# Patient Record
Sex: FEMALE | Race: WHITE | NOT HISPANIC OR LATINO | Employment: PART TIME | URBAN - METROPOLITAN AREA
[De-identification: names, ages, dates, MRNs, and addresses within clinical notes are randomized per-mention and may not be internally consistent; named-entity substitution may affect disease eponyms.]

---

## 2017-01-06 ENCOUNTER — ALLSCRIPTS OFFICE VISIT (OUTPATIENT)
Dept: OTHER | Facility: OTHER | Age: 42
End: 2017-01-06

## 2017-01-12 ENCOUNTER — ALLSCRIPTS OFFICE VISIT (OUTPATIENT)
Dept: OTHER | Facility: OTHER | Age: 42
End: 2017-01-12

## 2017-01-13 ENCOUNTER — TRANSCRIBE ORDERS (OUTPATIENT)
Dept: RADIOLOGY | Facility: CLINIC | Age: 42
End: 2017-01-13

## 2017-01-13 ENCOUNTER — HOSPITAL ENCOUNTER (OUTPATIENT)
Dept: RADIOLOGY | Facility: CLINIC | Age: 42
Discharge: HOME | End: 2017-01-13
Payer: COMMERCIAL

## 2017-01-13 ENCOUNTER — ALLSCRIPTS OFFICE VISIT (OUTPATIENT)
Dept: OTHER | Facility: OTHER | Age: 42
End: 2017-01-13

## 2017-01-13 ENCOUNTER — GENERIC CONVERSION - ENCOUNTER (OUTPATIENT)
Dept: OTHER | Facility: OTHER | Age: 42
End: 2017-01-13

## 2017-01-13 DIAGNOSIS — S99.922A INJURY OF LEFT FOOT: ICD-10-CM

## 2017-01-13 PROCEDURE — 73630 X-RAY EXAM OF FOOT: CPT

## 2017-01-26 ENCOUNTER — ALLSCRIPTS OFFICE VISIT (OUTPATIENT)
Dept: OTHER | Facility: OTHER | Age: 42
End: 2017-01-26

## 2017-02-11 ENCOUNTER — TRANSCRIBE ORDERS (OUTPATIENT)
Dept: ADMINISTRATIVE | Facility: HOSPITAL | Age: 42
End: 2017-02-11

## 2017-02-11 DIAGNOSIS — R10.9 ABDOMINAL PAIN, UNSPECIFIED SITE: ICD-10-CM

## 2017-02-11 DIAGNOSIS — Z98.890 PERSONAL HISTORY OF SURGERY TO HEART AND GREAT VESSELS, PRESENTING HAZARDS TO HEALTH: Primary | ICD-10-CM

## 2017-02-14 ENCOUNTER — HOSPITAL ENCOUNTER (OUTPATIENT)
Dept: RADIOLOGY | Facility: HOSPITAL | Age: 42
Discharge: HOME | End: 2017-02-14
Attending: FAMILY MEDICINE
Payer: COMMERCIAL

## 2017-02-14 DIAGNOSIS — R10.9 ABDOMINAL PAIN, UNSPECIFIED SITE: ICD-10-CM

## 2017-02-14 DIAGNOSIS — Z98.890 PERSONAL HISTORY OF SURGERY TO HEART AND GREAT VESSELS, PRESENTING HAZARDS TO HEALTH: ICD-10-CM

## 2017-02-14 PROCEDURE — 74177 CT ABD & PELVIS W/CONTRAST: CPT

## 2017-02-14 RX ADMIN — IOHEXOL 100 ML: 350 INJECTION, SOLUTION INTRAVENOUS at 10:39

## 2017-02-16 ENCOUNTER — GENERIC CONVERSION - ENCOUNTER (OUTPATIENT)
Dept: OTHER | Facility: OTHER | Age: 42
End: 2017-02-16

## 2017-02-20 ENCOUNTER — ALLSCRIPTS OFFICE VISIT (OUTPATIENT)
Dept: OTHER | Facility: OTHER | Age: 42
End: 2017-02-20

## 2017-02-20 DIAGNOSIS — H93.19 TINNITUS: ICD-10-CM

## 2017-02-21 ENCOUNTER — GENERIC CONVERSION - ENCOUNTER (OUTPATIENT)
Dept: OTHER | Facility: OTHER | Age: 42
End: 2017-02-21

## 2017-02-22 ENCOUNTER — GENERIC CONVERSION - ENCOUNTER (OUTPATIENT)
Dept: OTHER | Facility: OTHER | Age: 42
End: 2017-02-22

## 2017-02-22 ENCOUNTER — ALLSCRIPTS OFFICE VISIT (OUTPATIENT)
Dept: OTHER | Facility: OTHER | Age: 42
End: 2017-02-22

## 2017-02-23 LAB
ENDOMYSIAL IGA ANTIBODY (HISTORICAL): NEGATIVE
IGA (HISTORICAL): 175 MG/DL (ref 87–352)
TTG IGA (HISTORICAL): <2 U/ML (ref 0–3)

## 2017-02-24 ENCOUNTER — GENERIC CONVERSION - ENCOUNTER (OUTPATIENT)
Dept: OTHER | Facility: OTHER | Age: 42
End: 2017-02-24

## 2017-02-28 ENCOUNTER — ALLSCRIPTS OFFICE VISIT (OUTPATIENT)
Dept: OTHER | Facility: OTHER | Age: 42
End: 2017-02-28

## 2017-03-01 ENCOUNTER — ALLSCRIPTS OFFICE VISIT (OUTPATIENT)
Dept: OTHER | Facility: OTHER | Age: 42
End: 2017-03-01

## 2017-03-01 ENCOUNTER — GENERIC CONVERSION - ENCOUNTER (OUTPATIENT)
Dept: OTHER | Facility: OTHER | Age: 42
End: 2017-03-01

## 2017-03-01 ENCOUNTER — TRANSCRIBE ORDERS (OUTPATIENT)
Dept: LAB | Facility: CLINIC | Age: 42
End: 2017-03-01

## 2017-03-01 ENCOUNTER — APPOINTMENT (OUTPATIENT)
Dept: LAB | Facility: CLINIC | Age: 42
End: 2017-03-01
Payer: COMMERCIAL

## 2017-03-01 DIAGNOSIS — K52.9 IDIOPATHIC COLITIS: Primary | ICD-10-CM

## 2017-03-01 LAB — VENIPUNCTURE: NORMAL

## 2017-03-01 PROCEDURE — 36415 COLL VENOUS BLD VENIPUNCTURE: CPT

## 2017-03-02 LAB
25(OH)D3 SERPL-MCNC: 37.8 NG/ML (ref 30–100)
A/G RATIO (HISTORICAL): 1.7 (ref 1.1–2.5)
ALBUMIN SERPL BCP-MCNC: 4 G/DL (ref 3.5–5.5)
ALP SERPL-CCNC: 80 IU/L (ref 39–117)
ALT SERPL W P-5'-P-CCNC: 15 IU/L (ref 0–32)
ANTINUCLEAR ANTIBODIES, IFA (HISTORICAL): POSITIVE
AST SERPL W P-5'-P-CCNC: 17 IU/L (ref 0–40)
BILIRUB SERPL-MCNC: <0.2 MG/DL (ref 0–1.2)
BUN SERPL-MCNC: 12 MG/DL (ref 6–24)
BUN/CREA RATIO (HISTORICAL): 17 (ref 9–23)
C-REACT.PROTEIN,QUANT (HISTORICAL): 11.3 MG/L (ref 0–4.9)
CALCIUM SERPL-MCNC: 8.4 MG/DL (ref 8.7–10.2)
CHLORIDE SERPL-SCNC: 106 MMOL/L (ref 96–106)
CO2 SERPL-SCNC: 18 MMOL/L (ref 18–29)
CREAT SERPL-MCNC: 0.71 MG/DL (ref 0.57–1)
DEPRECATED RDW RBC AUTO: 14.7 % (ref 12.3–15.4)
EGFR AFRICAN AMERICAN (HISTORICAL): 122 ML/MIN/1.73
EGFR-AMERICAN CALC (HISTORICAL): 106 ML/MIN/1.73
ERYTHROCYTE SEDIMENTATION RATE (HISTORICAL): 29 MM/HR (ref 0–32)
GLUCOSE SERPL-MCNC: 68 MG/DL (ref 65–99)
HCT VFR BLD AUTO: 32.2 % (ref 34–46.6)
HGB BLD-MCNC: 10.6 G/DL (ref 11.1–15.9)
LYME IGG/IGM AB (HISTORICAL): <0.91 ISR (ref 0–0.9)
LYME IGM (HISTORICAL): <0.8 INDEX (ref 0–0.79)
MAGNESIUM SERPL-MCNC: 2 MG/DL (ref 1.6–2.3)
MCH RBC QN AUTO: 28.3 PG (ref 26.6–33)
MCHC RBC AUTO-ENTMCNC: 32.9 G/DL (ref 31.5–35.7)
MCV RBC AUTO: 86 FL (ref 79–97)
NOTE: (HISTORICAL): ABNORMAL
NUCLEOLAR PATTERN (HISTORICAL): ABNORMAL
PLATELET # BLD AUTO: 331 X10E3/UL (ref 150–379)
POTASSIUM SERPL-SCNC: 4 MMOL/L (ref 3.5–5.2)
RA LATEX TURBID (HISTORICAL): <10 IU/ML (ref 0–13.9)
RBC (HISTORICAL): 3.74 X10E6/UL (ref 3.77–5.28)
SODIUM SERPL-SCNC: 141 MMOL/L (ref 134–144)
TOT. GLOBULIN, SERUM (HISTORICAL): 2.3 G/DL (ref 1.5–4.5)
TOTAL PROTEIN (HISTORICAL): 6.3 G/DL (ref 6–8.5)
TSH SERPL DL<=0.05 MIU/L-ACNC: 1.9 UIU/ML (ref 0.45–4.5)
URIC ACID (HISTORICAL): 4.1 MG/DL (ref 2.5–7.1)
VIT B12 SERPL-MCNC: 881 PG/ML (ref 211–946)
WBC # BLD AUTO: 9.3 X10E3/UL (ref 3.4–10.8)

## 2017-03-03 ENCOUNTER — APPOINTMENT (OUTPATIENT)
Dept: AUDIOLOGY | Facility: CLINIC | Age: 42
End: 2017-03-03
Payer: COMMERCIAL

## 2017-03-03 ENCOUNTER — GENERIC CONVERSION - ENCOUNTER (OUTPATIENT)
Dept: OTHER | Facility: OTHER | Age: 42
End: 2017-03-03

## 2017-03-03 PROCEDURE — 92567 TYMPANOMETRY: CPT | Performed by: AUDIOLOGIST

## 2017-03-03 PROCEDURE — 92557 COMPREHENSIVE HEARING TEST: CPT | Performed by: AUDIOLOGIST

## 2017-03-07 ENCOUNTER — ALLSCRIPTS OFFICE VISIT (OUTPATIENT)
Dept: OTHER | Facility: OTHER | Age: 42
End: 2017-03-07

## 2017-03-12 LAB
Lab: NORMAL
Lab: NORMAL

## 2017-03-13 LAB
RESULT 1 (HISTORICAL): NORMAL
WHITE BLOOD CELLS, STOOL (HISTORICAL): NORMAL

## 2017-03-14 ENCOUNTER — GENERIC CONVERSION - ENCOUNTER (OUTPATIENT)
Dept: OTHER | Facility: OTHER | Age: 42
End: 2017-03-14

## 2017-03-20 ENCOUNTER — ALLSCRIPTS OFFICE VISIT (OUTPATIENT)
Dept: OTHER | Facility: OTHER | Age: 42
End: 2017-03-20

## 2017-03-24 ENCOUNTER — GENERIC CONVERSION - ENCOUNTER (OUTPATIENT)
Dept: OTHER | Facility: OTHER | Age: 42
End: 2017-03-24

## 2017-03-25 ENCOUNTER — HOSPITAL ENCOUNTER (EMERGENCY)
Facility: HOSPITAL | Age: 42
Discharge: HOME | End: 2017-03-25
Admitting: EMERGENCY MEDICINE
Payer: COMMERCIAL

## 2017-03-25 ENCOUNTER — APPOINTMENT (EMERGENCY)
Dept: RADIOLOGY | Facility: HOSPITAL | Age: 42
End: 2017-03-25
Payer: COMMERCIAL

## 2017-03-25 VITALS
DIASTOLIC BLOOD PRESSURE: 82 MMHG | OXYGEN SATURATION: 100 % | TEMPERATURE: 97.8 F | WEIGHT: 155 LBS | BODY MASS INDEX: 24.91 KG/M2 | RESPIRATION RATE: 18 BRPM | HEIGHT: 66 IN | HEART RATE: 70 BPM | SYSTOLIC BLOOD PRESSURE: 125 MMHG

## 2017-03-25 DIAGNOSIS — S61.219A LACERATION OF FINGER OF LEFT HAND, INITIAL ENCOUNTER: Primary | ICD-10-CM

## 2017-03-25 PROCEDURE — 73140 X-RAY EXAM OF FINGER(S): CPT

## 2017-03-25 PROCEDURE — 99283 EMERGENCY DEPT VISIT LOW MDM: CPT

## 2017-03-25 RX ORDER — LIDOCAINE HYDROCHLORIDE 10 MG/ML
5 INJECTION, SOLUTION EPIDURAL; INFILTRATION; INTRACAUDAL; PERINEURAL ONCE
Status: COMPLETED | OUTPATIENT
Start: 2017-03-25 | End: 2017-03-25

## 2017-03-25 RX ORDER — GINSENG 100 MG
1 CAPSULE ORAL ONCE
Status: COMPLETED | OUTPATIENT
Start: 2017-03-25 | End: 2017-03-25

## 2017-03-25 RX ORDER — BUPIVACAINE HYDROCHLORIDE 5 MG/ML
10 INJECTION, SOLUTION EPIDURAL; INTRACAUDAL ONCE
Status: COMPLETED | OUTPATIENT
Start: 2017-03-25 | End: 2017-03-25

## 2017-03-25 RX ADMIN — LIDOCAINE HYDROCHLORIDE 5 ML: 10 INJECTION, SOLUTION EPIDURAL; INFILTRATION; INTRACAUDAL; PERINEURAL at 16:58

## 2017-03-25 RX ADMIN — BUPIVACAINE HYDROCHLORIDE 10 ML: 5 INJECTION, SOLUTION EPIDURAL; INTRACAUDAL at 16:58

## 2017-03-25 RX ADMIN — BACITRACIN ZINC 1 SMALL APPLICATION: 500 OINTMENT TOPICAL at 16:58

## 2017-04-05 ENCOUNTER — ALLSCRIPTS OFFICE VISIT (OUTPATIENT)
Dept: OTHER | Facility: OTHER | Age: 42
End: 2017-04-05

## 2017-04-10 ENCOUNTER — ALLSCRIPTS OFFICE VISIT (OUTPATIENT)
Dept: OTHER | Facility: OTHER | Age: 42
End: 2017-04-10

## 2017-04-10 DIAGNOSIS — M54.50 LOW BACK PAIN: ICD-10-CM

## 2017-04-12 ENCOUNTER — GENERIC CONVERSION - ENCOUNTER (OUTPATIENT)
Dept: OTHER | Facility: OTHER | Age: 42
End: 2017-04-12

## 2017-04-12 ENCOUNTER — APPOINTMENT (OUTPATIENT)
Dept: PHYSICAL THERAPY | Facility: CLINIC | Age: 42
End: 2017-04-12
Payer: COMMERCIAL

## 2017-04-12 PROCEDURE — 97110 THERAPEUTIC EXERCISES: CPT

## 2017-04-12 PROCEDURE — 97162 PT EVAL MOD COMPLEX 30 MIN: CPT

## 2017-04-24 ENCOUNTER — APPOINTMENT (OUTPATIENT)
Dept: PHYSICAL THERAPY | Facility: CLINIC | Age: 42
End: 2017-04-24
Payer: COMMERCIAL

## 2017-04-24 PROCEDURE — 97110 THERAPEUTIC EXERCISES: CPT

## 2017-04-24 PROCEDURE — 97112 NEUROMUSCULAR REEDUCATION: CPT

## 2017-04-27 ENCOUNTER — ALLSCRIPTS OFFICE VISIT (OUTPATIENT)
Dept: OTHER | Facility: OTHER | Age: 42
End: 2017-04-27

## 2017-04-27 ENCOUNTER — APPOINTMENT (OUTPATIENT)
Dept: PHYSICAL THERAPY | Facility: CLINIC | Age: 42
End: 2017-04-27
Payer: COMMERCIAL

## 2017-04-27 PROCEDURE — 97112 NEUROMUSCULAR REEDUCATION: CPT

## 2017-04-27 PROCEDURE — 97110 THERAPEUTIC EXERCISES: CPT

## 2017-05-01 ENCOUNTER — APPOINTMENT (OUTPATIENT)
Dept: PHYSICAL THERAPY | Facility: CLINIC | Age: 42
End: 2017-05-01
Payer: COMMERCIAL

## 2017-05-01 PROCEDURE — 97110 THERAPEUTIC EXERCISES: CPT

## 2017-05-01 PROCEDURE — 97112 NEUROMUSCULAR REEDUCATION: CPT

## 2017-05-04 ENCOUNTER — APPOINTMENT (OUTPATIENT)
Dept: PHYSICAL THERAPY | Facility: CLINIC | Age: 42
End: 2017-05-04
Payer: COMMERCIAL

## 2017-05-04 PROCEDURE — 97112 NEUROMUSCULAR REEDUCATION: CPT

## 2017-05-04 PROCEDURE — 97140 MANUAL THERAPY 1/> REGIONS: CPT

## 2017-05-08 ENCOUNTER — APPOINTMENT (OUTPATIENT)
Dept: PHYSICAL THERAPY | Facility: CLINIC | Age: 42
End: 2017-05-08
Payer: COMMERCIAL

## 2017-05-09 ENCOUNTER — ALLSCRIPTS OFFICE VISIT (OUTPATIENT)
Dept: OTHER | Facility: OTHER | Age: 42
End: 2017-05-09

## 2017-05-09 ENCOUNTER — APPOINTMENT (OUTPATIENT)
Dept: PHYSICAL THERAPY | Facility: CLINIC | Age: 42
End: 2017-05-09
Payer: COMMERCIAL

## 2017-05-09 PROCEDURE — 97110 THERAPEUTIC EXERCISES: CPT

## 2017-05-11 ENCOUNTER — ALLSCRIPTS OFFICE VISIT (OUTPATIENT)
Dept: OTHER | Facility: OTHER | Age: 42
End: 2017-05-11

## 2017-05-11 ENCOUNTER — APPOINTMENT (OUTPATIENT)
Dept: PHYSICAL THERAPY | Facility: CLINIC | Age: 42
End: 2017-05-11
Payer: COMMERCIAL

## 2017-05-12 ENCOUNTER — APPOINTMENT (OUTPATIENT)
Dept: PHYSICAL THERAPY | Facility: CLINIC | Age: 42
End: 2017-05-12
Payer: COMMERCIAL

## 2017-05-12 PROCEDURE — 97112 NEUROMUSCULAR REEDUCATION: CPT

## 2017-05-12 PROCEDURE — 97110 THERAPEUTIC EXERCISES: CPT

## 2017-05-15 ENCOUNTER — APPOINTMENT (OUTPATIENT)
Dept: PHYSICAL THERAPY | Facility: CLINIC | Age: 42
End: 2017-05-15
Payer: COMMERCIAL

## 2017-05-15 PROCEDURE — 97110 THERAPEUTIC EXERCISES: CPT

## 2017-05-18 ENCOUNTER — APPOINTMENT (OUTPATIENT)
Dept: PHYSICAL THERAPY | Facility: CLINIC | Age: 42
End: 2017-05-18
Payer: COMMERCIAL

## 2017-05-19 ENCOUNTER — APPOINTMENT (OUTPATIENT)
Dept: PHYSICAL THERAPY | Facility: CLINIC | Age: 42
End: 2017-05-19
Payer: COMMERCIAL

## 2017-05-19 PROCEDURE — 97110 THERAPEUTIC EXERCISES: CPT

## 2017-05-22 ENCOUNTER — APPOINTMENT (OUTPATIENT)
Dept: PHYSICAL THERAPY | Facility: CLINIC | Age: 42
End: 2017-05-22
Payer: COMMERCIAL

## 2017-05-23 ENCOUNTER — APPOINTMENT (OUTPATIENT)
Dept: PHYSICAL THERAPY | Facility: CLINIC | Age: 42
End: 2017-05-23
Payer: COMMERCIAL

## 2017-05-23 ENCOUNTER — GENERIC CONVERSION - ENCOUNTER (OUTPATIENT)
Dept: OTHER | Facility: OTHER | Age: 42
End: 2017-05-23

## 2017-05-23 PROCEDURE — 97110 THERAPEUTIC EXERCISES: CPT

## 2017-05-23 PROCEDURE — 97112 NEUROMUSCULAR REEDUCATION: CPT

## 2017-05-25 ENCOUNTER — APPOINTMENT (OUTPATIENT)
Dept: PHYSICAL THERAPY | Facility: CLINIC | Age: 42
End: 2017-05-25
Payer: COMMERCIAL

## 2017-05-25 PROCEDURE — 97110 THERAPEUTIC EXERCISES: CPT

## 2017-05-25 PROCEDURE — 97140 MANUAL THERAPY 1/> REGIONS: CPT

## 2017-05-30 ENCOUNTER — APPOINTMENT (OUTPATIENT)
Dept: PHYSICAL THERAPY | Facility: CLINIC | Age: 42
End: 2017-05-30
Payer: COMMERCIAL

## 2017-05-30 ENCOUNTER — ALLSCRIPTS OFFICE VISIT (OUTPATIENT)
Dept: OTHER | Facility: OTHER | Age: 42
End: 2017-05-30

## 2017-05-30 PROCEDURE — 97110 THERAPEUTIC EXERCISES: CPT

## 2017-06-16 ENCOUNTER — ALLSCRIPTS OFFICE VISIT (OUTPATIENT)
Dept: OTHER | Facility: OTHER | Age: 42
End: 2017-06-16

## 2017-06-16 DIAGNOSIS — N92.6 IRREGULAR MENSTRUATION: ICD-10-CM

## 2017-06-16 DIAGNOSIS — D25.9 LEIOMYOMA OF UTERUS: ICD-10-CM

## 2017-06-21 ENCOUNTER — HOSPITAL ENCOUNTER (OUTPATIENT)
Dept: RADIOLOGY | Facility: HOSPITAL | Age: 42
Discharge: HOME | End: 2017-06-21
Payer: COMMERCIAL

## 2017-06-21 DIAGNOSIS — N92.6 IRREGULAR MENSTRUATION: ICD-10-CM

## 2017-06-21 DIAGNOSIS — D25.9 LEIOMYOMA OF UTERUS: ICD-10-CM

## 2017-06-21 PROCEDURE — 76830 TRANSVAGINAL US NON-OB: CPT

## 2017-06-21 PROCEDURE — 76856 US EXAM PELVIC COMPLETE: CPT

## 2017-06-22 ENCOUNTER — GENERIC CONVERSION - ENCOUNTER (OUTPATIENT)
Dept: OTHER | Facility: OTHER | Age: 42
End: 2017-06-22

## 2017-07-26 ENCOUNTER — ALLSCRIPTS OFFICE VISIT (OUTPATIENT)
Dept: OTHER | Facility: OTHER | Age: 42
End: 2017-07-26

## 2017-08-01 ENCOUNTER — ALLSCRIPTS OFFICE VISIT (OUTPATIENT)
Dept: OTHER | Facility: OTHER | Age: 42
End: 2017-08-01

## 2017-08-01 LAB
A/G RATIO (HISTORICAL): 1.4 (ref 1.2–2.2)
ALBUMIN SERPL BCP-MCNC: 3.8 G/DL (ref 3.5–5.5)
ALP SERPL-CCNC: 80 IU/L (ref 39–117)
ALT SERPL W P-5'-P-CCNC: 22 IU/L (ref 0–32)
AST SERPL W P-5'-P-CCNC: 26 IU/L (ref 0–40)
BASOPHILS # BLD AUTO: 0 X10E3/UL (ref 0–0.2)
BASOPHILS # BLD AUTO: 1 %
BILIRUB SERPL-MCNC: <0.2 MG/DL (ref 0–1.2)
BUN SERPL-MCNC: 17 MG/DL (ref 6–24)
BUN/CREA RATIO (HISTORICAL): 20 (ref 9–23)
CALCIUM SERPL-MCNC: 8.1 MG/DL (ref 8.7–10.2)
CHLORIDE SERPL-SCNC: 106 MMOL/L (ref 96–106)
CO2 SERPL-SCNC: 21 MMOL/L (ref 18–29)
CREAT SERPL-MCNC: 0.83 MG/DL (ref 0.57–1)
DEPRECATED RDW RBC AUTO: 16 % (ref 12.3–15.4)
EGFR AFRICAN AMERICAN (HISTORICAL): 101 ML/MIN/1.73
EGFR-AMERICAN CALC (HISTORICAL): 87 ML/MIN/1.73
EOSINOPHIL # BLD AUTO: 0.1 X10E3/UL (ref 0–0.4)
EOSINOPHIL # BLD AUTO: 2 %
GLUCOSE SERPL-MCNC: 66 MG/DL (ref 65–99)
HCT VFR BLD AUTO: 32.2 % (ref 34–46.6)
HGB BLD-MCNC: 10.3 G/DL (ref 11.1–15.9)
IMM.GRANULOCYTES (CD4/8) (HISTORICAL): 0 %
IMM.GRANULOCYTES (CD4/8) (HISTORICAL): 0 X10E3/UL (ref 0–0.1)
IRON SERPL-MCNC: 38 UG/DL (ref 27–159)
LYMPHOCYTES # BLD AUTO: 1.4 X10E3/UL (ref 0.7–3.1)
LYMPHOCYTES # BLD AUTO: 27 %
MCH RBC QN AUTO: 25.8 PG (ref 26.6–33)
MCHC RBC AUTO-ENTMCNC: 32 G/DL (ref 31.5–35.7)
MCV RBC AUTO: 81 FL (ref 79–97)
MONOCYTES # BLD AUTO: 0.4 X10E3/UL (ref 0.1–0.9)
MONOCYTES (HISTORICAL): 7 %
NEUTROPHILS # BLD AUTO: 3.3 X10E3/UL (ref 1.4–7)
NEUTROPHILS # BLD AUTO: 63 %
PLATELET # BLD AUTO: 389 X10E3/UL (ref 150–379)
POTASSIUM SERPL-SCNC: 4 MMOL/L (ref 3.5–5.2)
RBC (HISTORICAL): 4 X10E6/UL (ref 3.77–5.28)
SODIUM SERPL-SCNC: 140 MMOL/L (ref 134–144)
TOT. GLOBULIN, SERUM (HISTORICAL): 2.7 G/DL (ref 1.5–4.5)
TOTAL PROTEIN (HISTORICAL): 6.5 G/DL (ref 6–8.5)
WBC # BLD AUTO: 5.3 X10E3/UL (ref 3.4–10.8)

## 2017-08-02 LAB — FERRITIN SERPL-MCNC: 6 NG/ML (ref 15–150)

## 2017-08-03 ENCOUNTER — GENERIC CONVERSION - ENCOUNTER (OUTPATIENT)
Dept: OTHER | Facility: OTHER | Age: 42
End: 2017-08-03

## 2017-08-04 ENCOUNTER — GENERIC CONVERSION - ENCOUNTER (OUTPATIENT)
Dept: OTHER | Facility: OTHER | Age: 42
End: 2017-08-04

## 2017-08-07 ENCOUNTER — ALLSCRIPTS OFFICE VISIT (OUTPATIENT)
Dept: OTHER | Facility: OTHER | Age: 42
End: 2017-08-07

## 2017-08-10 ENCOUNTER — HOSPITAL ENCOUNTER (OUTPATIENT)
Dept: INFUSION CENTER | Facility: HOSPITAL | Age: 42
Discharge: HOME | End: 2017-08-10
Payer: COMMERCIAL

## 2017-08-10 ENCOUNTER — APPOINTMENT (EMERGENCY)
Dept: RADIOLOGY | Facility: HOSPITAL | Age: 42
End: 2017-08-10
Payer: COMMERCIAL

## 2017-08-10 ENCOUNTER — HOSPITAL ENCOUNTER (EMERGENCY)
Facility: HOSPITAL | Age: 42
Discharge: HOME | End: 2017-08-10
Attending: EMERGENCY MEDICINE | Admitting: EMERGENCY MEDICINE
Payer: COMMERCIAL

## 2017-08-10 VITALS
SYSTOLIC BLOOD PRESSURE: 116 MMHG | BODY MASS INDEX: 26.63 KG/M2 | OXYGEN SATURATION: 98 % | TEMPERATURE: 97.1 F | WEIGHT: 165 LBS | HEART RATE: 85 BPM | DIASTOLIC BLOOD PRESSURE: 55 MMHG | RESPIRATION RATE: 18 BRPM

## 2017-08-10 DIAGNOSIS — K20.90 ESOPHAGITIS: Primary | ICD-10-CM

## 2017-08-10 DIAGNOSIS — R07.9 CHEST PAIN: ICD-10-CM

## 2017-08-10 LAB
ALBUMIN SERPL BCP-MCNC: 3.2 G/DL (ref 3.5–5)
ALP SERPL-CCNC: 64 U/L (ref 46–116)
ALT SERPL W P-5'-P-CCNC: 33 U/L (ref 12–78)
ANION GAP SERPL CALCULATED.3IONS-SCNC: 8 MMOL/L (ref 4–13)
APTT PPP: 25 SECONDS (ref 23–35)
AST SERPL W P-5'-P-CCNC: 23 U/L (ref 5–45)
BASOPHILS # BLD AUTO: 0.1 THOUSANDS/ΜL (ref 0–0.1)
BASOPHILS NFR BLD AUTO: 2 % (ref 0–1)
BILIRUB SERPL-MCNC: 0.2 MG/DL (ref 0.2–1)
BILIRUB UR QL STRIP: NEGATIVE
BUN SERPL-MCNC: 12 MG/DL (ref 5–25)
CALCIUM SERPL-MCNC: 7.6 MG/DL (ref 8.3–10.1)
CHLORIDE SERPL-SCNC: 111 MMOL/L (ref 100–108)
CLARITY UR: CLEAR
CO2 SERPL-SCNC: 24 MMOL/L (ref 21–32)
COLOR UR: YELLOW
CREAT SERPL-MCNC: 0.87 MG/DL (ref 0.6–1.3)
DEPRECATED D DIMER PPP: 530 NG/ML (FEU) (ref 190–520)
EOSINOPHIL # BLD AUTO: 0.2 THOUSAND/ΜL (ref 0–0.61)
EOSINOPHIL NFR BLD AUTO: 3 % (ref 0–6)
ERYTHROCYTE [DISTWIDTH] IN BLOOD BY AUTOMATED COUNT: 17.1 % (ref 11.6–15.1)
GFR SERPL CREATININE-BSD FRML MDRD: 82 ML/MIN/1.73SQ M
GLUCOSE SERPL-MCNC: 82 MG/DL (ref 65–140)
GLUCOSE UR STRIP-MCNC: NEGATIVE MG/DL
HCG UR QL: NEGATIVE
HCT VFR BLD AUTO: 31.6 % (ref 37–47)
HGB BLD-MCNC: 10.1 G/DL (ref 12–16)
HGB UR QL STRIP.AUTO: NEGATIVE
INR PPP: 1.02 (ref 0.86–1.16)
KETONES UR STRIP-MCNC: NEGATIVE MG/DL
LEUKOCYTE ESTERASE UR QL STRIP: NEGATIVE
LIPASE SERPL-CCNC: 183 U/L (ref 73–393)
LYMPHOCYTES # BLD AUTO: 1.6 THOUSANDS/ΜL (ref 0.6–4.47)
LYMPHOCYTES NFR BLD AUTO: 31 % (ref 14–44)
MAGNESIUM SERPL-MCNC: 2 MG/DL (ref 1.6–2.6)
MCH RBC QN AUTO: 25.4 PG (ref 27–31)
MCHC RBC AUTO-ENTMCNC: 31.9 G/DL (ref 31.4–37.4)
MCV RBC AUTO: 80 FL (ref 82–98)
MONOCYTES # BLD AUTO: 0.4 THOUSAND/ΜL (ref 0.17–1.22)
MONOCYTES NFR BLD AUTO: 7 % (ref 4–12)
NEUTROPHILS # BLD AUTO: 2.9 THOUSANDS/ΜL (ref 1.85–7.62)
NEUTS SEG NFR BLD AUTO: 57 % (ref 43–75)
NITRITE UR QL STRIP: NEGATIVE
NRBC BLD AUTO-RTO: 0 /100 WBCS
NT-PROBNP SERPL-MCNC: 41 PG/ML
PH UR STRIP.AUTO: 8.5 [PH] (ref 5–9)
PLATELET # BLD AUTO: 402 THOUSANDS/UL (ref 130–400)
PMV BLD AUTO: 8 FL (ref 8.9–12.7)
POTASSIUM SERPL-SCNC: 3.6 MMOL/L (ref 3.5–5.3)
PROT SERPL-MCNC: 6.4 G/DL (ref 6.4–8.2)
PROT UR STRIP-MCNC: NEGATIVE MG/DL
PROTHROMBIN TIME: 10.7 SECONDS (ref 9.4–11.7)
RBC # BLD AUTO: 3.97 MILLION/UL (ref 4.2–5.4)
SODIUM SERPL-SCNC: 143 MMOL/L (ref 136–145)
SP GR UR STRIP.AUTO: 1.02 (ref 1–1.03)
TROPONIN I SERPL-MCNC: <0.02 NG/ML
UROBILINOGEN UR QL STRIP.AUTO: 0.2 E.U./DL
WBC # BLD AUTO: 5 THOUSAND/UL (ref 4.8–10.8)

## 2017-08-10 PROCEDURE — 83735 ASSAY OF MAGNESIUM: CPT | Performed by: EMERGENCY MEDICINE

## 2017-08-10 PROCEDURE — 81003 URINALYSIS AUTO W/O SCOPE: CPT | Performed by: EMERGENCY MEDICINE

## 2017-08-10 PROCEDURE — 83880 ASSAY OF NATRIURETIC PEPTIDE: CPT | Performed by: EMERGENCY MEDICINE

## 2017-08-10 PROCEDURE — 96374 THER/PROPH/DIAG INJ IV PUSH: CPT

## 2017-08-10 PROCEDURE — 80053 COMPREHEN METABOLIC PANEL: CPT | Performed by: EMERGENCY MEDICINE

## 2017-08-10 PROCEDURE — 85379 FIBRIN DEGRADATION QUANT: CPT | Performed by: EMERGENCY MEDICINE

## 2017-08-10 PROCEDURE — 84484 ASSAY OF TROPONIN QUANT: CPT | Performed by: EMERGENCY MEDICINE

## 2017-08-10 PROCEDURE — 94640 AIRWAY INHALATION TREATMENT: CPT

## 2017-08-10 PROCEDURE — 71275 CT ANGIOGRAPHY CHEST: CPT

## 2017-08-10 PROCEDURE — 93005 ELECTROCARDIOGRAM TRACING: CPT | Performed by: EMERGENCY MEDICINE

## 2017-08-10 PROCEDURE — 85730 THROMBOPLASTIN TIME PARTIAL: CPT | Performed by: EMERGENCY MEDICINE

## 2017-08-10 PROCEDURE — 71010 HB CHEST X-RAY 1 VIEW FRONTAL (PORTABLE): CPT

## 2017-08-10 PROCEDURE — 81025 URINE PREGNANCY TEST: CPT | Performed by: EMERGENCY MEDICINE

## 2017-08-10 PROCEDURE — 36415 COLL VENOUS BLD VENIPUNCTURE: CPT | Performed by: EMERGENCY MEDICINE

## 2017-08-10 PROCEDURE — 83690 ASSAY OF LIPASE: CPT | Performed by: EMERGENCY MEDICINE

## 2017-08-10 PROCEDURE — 96375 TX/PRO/DX INJ NEW DRUG ADDON: CPT

## 2017-08-10 PROCEDURE — 99285 EMERGENCY DEPT VISIT HI MDM: CPT

## 2017-08-10 PROCEDURE — 85610 PROTHROMBIN TIME: CPT | Performed by: EMERGENCY MEDICINE

## 2017-08-10 PROCEDURE — 96361 HYDRATE IV INFUSION ADD-ON: CPT

## 2017-08-10 PROCEDURE — 85025 COMPLETE CBC W/AUTO DIFF WBC: CPT | Performed by: EMERGENCY MEDICINE

## 2017-08-10 PROCEDURE — 96376 TX/PRO/DX INJ SAME DRUG ADON: CPT

## 2017-08-10 RX ORDER — METHYLPREDNISOLONE SODIUM SUCCINATE 125 MG/2ML
125 INJECTION, POWDER, LYOPHILIZED, FOR SOLUTION INTRAMUSCULAR; INTRAVENOUS ONCE
Status: COMPLETED | OUTPATIENT
Start: 2017-08-10 | End: 2017-08-10

## 2017-08-10 RX ORDER — IPRATROPIUM BROMIDE AND ALBUTEROL SULFATE 2.5; .5 MG/3ML; MG/3ML
3 SOLUTION RESPIRATORY (INHALATION) ONCE
Status: COMPLETED | OUTPATIENT
Start: 2017-08-10 | End: 2017-08-10

## 2017-08-10 RX ORDER — DIPHENHYDRAMINE HYDROCHLORIDE 50 MG/ML
25 INJECTION INTRAMUSCULAR; INTRAVENOUS ONCE
Status: COMPLETED | OUTPATIENT
Start: 2017-08-10 | End: 2017-08-10

## 2017-08-10 RX ORDER — HYDROCODONE BITARTRATE AND ACETAMINOPHEN 5; 325 MG/1; MG/1
1 TABLET ORAL EVERY 8 HOURS PRN
Qty: 10 TABLET | Refills: 0 | Status: SHIPPED | OUTPATIENT
Start: 2017-08-10 | End: 2017-08-13

## 2017-08-10 RX ORDER — ONDANSETRON 2 MG/ML
4 INJECTION INTRAMUSCULAR; INTRAVENOUS ONCE
Status: COMPLETED | OUTPATIENT
Start: 2017-08-10 | End: 2017-08-10

## 2017-08-10 RX ADMIN — SODIUM CHLORIDE 1000 ML: 0.9 INJECTION, SOLUTION INTRAVENOUS at 12:48

## 2017-08-10 RX ADMIN — HYDROMORPHONE HYDROCHLORIDE 1 MG: 1 INJECTION, SOLUTION INTRAMUSCULAR; INTRAVENOUS; SUBCUTANEOUS at 15:11

## 2017-08-10 RX ADMIN — HYDROMORPHONE HYDROCHLORIDE 1 MG: 1 INJECTION, SOLUTION INTRAMUSCULAR; INTRAVENOUS; SUBCUTANEOUS at 12:58

## 2017-08-10 RX ADMIN — IOHEXOL 85 ML: 350 INJECTION, SOLUTION INTRAVENOUS at 13:59

## 2017-08-10 RX ADMIN — IPRATROPIUM BROMIDE AND ALBUTEROL SULFATE 3 ML: .5; 3 SOLUTION RESPIRATORY (INHALATION) at 12:54

## 2017-08-10 RX ADMIN — METHYLPREDNISOLONE SODIUM SUCCINATE 125 MG: 125 INJECTION, POWDER, FOR SOLUTION INTRAMUSCULAR; INTRAVENOUS at 12:53

## 2017-08-10 RX ADMIN — ONDANSETRON 4 MG: 2 INJECTION INTRAMUSCULAR; INTRAVENOUS at 12:57

## 2017-08-10 RX ADMIN — DIPHENHYDRAMINE HYDROCHLORIDE 25 MG: 50 INJECTION, SOLUTION INTRAMUSCULAR; INTRAVENOUS at 12:52

## 2017-08-11 ENCOUNTER — ALLSCRIPTS OFFICE VISIT (OUTPATIENT)
Dept: OTHER | Facility: OTHER | Age: 42
End: 2017-08-11

## 2017-08-14 ENCOUNTER — HOSPITAL ENCOUNTER (OUTPATIENT)
Dept: INFUSION CENTER | Facility: HOSPITAL | Age: 42
Discharge: HOME | End: 2017-08-14
Payer: COMMERCIAL

## 2017-08-14 VITALS
OXYGEN SATURATION: 98 % | HEART RATE: 90 BPM | RESPIRATION RATE: 16 BRPM | SYSTOLIC BLOOD PRESSURE: 119 MMHG | TEMPERATURE: 99.2 F | DIASTOLIC BLOOD PRESSURE: 72 MMHG

## 2017-08-14 PROCEDURE — 96374 THER/PROPH/DIAG INJ IV PUSH: CPT

## 2017-08-14 RX ADMIN — IRON SUCROSE 200 MG: 20 INJECTION, SOLUTION INTRAVENOUS at 14:00

## 2017-08-15 ENCOUNTER — GENERIC CONVERSION - ENCOUNTER (OUTPATIENT)
Dept: OTHER | Facility: OTHER | Age: 42
End: 2017-08-15

## 2017-08-17 LAB
ATRIAL RATE: 86 BPM
P AXIS: 66 DEGREES
PR INTERVAL: 140 MS
QRS AXIS: 27 DEGREES
QRSD INTERVAL: 80 MS
QT INTERVAL: 366 MS
QTC INTERVAL: 437 MS
T WAVE AXIS: 35 DEGREES
VENTRICULAR RATE: 86 BPM

## 2017-08-24 ENCOUNTER — HOSPITAL ENCOUNTER (OUTPATIENT)
Dept: INFUSION CENTER | Facility: HOSPITAL | Age: 42
Discharge: HOME | End: 2017-08-24
Payer: COMMERCIAL

## 2017-08-24 VITALS
OXYGEN SATURATION: 98 % | DIASTOLIC BLOOD PRESSURE: 60 MMHG | SYSTOLIC BLOOD PRESSURE: 144 MMHG | RESPIRATION RATE: 16 BRPM | HEART RATE: 83 BPM | TEMPERATURE: 98.2 F

## 2017-08-24 PROCEDURE — 96374 THER/PROPH/DIAG INJ IV PUSH: CPT

## 2017-08-24 RX ADMIN — IRON SUCROSE 200 MG: 20 INJECTION, SOLUTION INTRAVENOUS at 10:18

## 2017-08-25 ENCOUNTER — ALLSCRIPTS OFFICE VISIT (OUTPATIENT)
Dept: OTHER | Facility: OTHER | Age: 42
End: 2017-08-25

## 2017-08-27 LAB
DEPRECATED RDW RBC AUTO: 18.5 % (ref 12.3–15.4)
FREE THYROXINE INDEX (HISTORICAL): 1.7 (ref 1.2–4.9)
HCT VFR BLD AUTO: 35.9 % (ref 34–46.6)
HGB BLD-MCNC: 11.4 G/DL (ref 11.1–15.9)
IRON SERPL-MCNC: 38 UG/DL (ref 27–159)
MCH RBC QN AUTO: 25.9 PG (ref 26.6–33)
MCHC RBC AUTO-ENTMCNC: 31.8 G/DL (ref 31.5–35.7)
MCV RBC AUTO: 82 FL (ref 79–97)
PLATELET # BLD AUTO: 374 X10E3/UL (ref 150–379)
RBC (HISTORICAL): 4.4 X10E6/UL (ref 3.77–5.28)
T3 UPTAKE RATIO (HISTORICAL): 31 % (ref 24–39)
T4 FREE SERPL-MCNC: 5.6 UG/DL (ref 4.5–12)
WBC # BLD AUTO: 4.8 X10E3/UL (ref 3.4–10.8)

## 2017-08-28 ENCOUNTER — GENERIC CONVERSION - ENCOUNTER (OUTPATIENT)
Dept: OTHER | Facility: OTHER | Age: 42
End: 2017-08-28

## 2017-08-30 LAB
CALCIUM SERPL-MCNC: 8.3 MG/DL
PTH-INTACT SERPL-MCNC: 29 PG/ML

## 2017-08-31 ENCOUNTER — HOSPITAL ENCOUNTER (OUTPATIENT)
Dept: INFUSION CENTER | Facility: HOSPITAL | Age: 42
Discharge: HOME | End: 2017-08-31
Payer: COMMERCIAL

## 2017-08-31 VITALS
DIASTOLIC BLOOD PRESSURE: 63 MMHG | HEART RATE: 90 BPM | OXYGEN SATURATION: 98 % | TEMPERATURE: 98.5 F | SYSTOLIC BLOOD PRESSURE: 110 MMHG | RESPIRATION RATE: 16 BRPM

## 2017-08-31 PROCEDURE — 96374 THER/PROPH/DIAG INJ IV PUSH: CPT

## 2017-08-31 RX ADMIN — IRON SUCROSE 200 MG: 20 INJECTION, SOLUTION INTRAVENOUS at 10:20

## 2017-09-01 ENCOUNTER — ALLSCRIPTS OFFICE VISIT (OUTPATIENT)
Dept: OTHER | Facility: OTHER | Age: 42
End: 2017-09-01

## 2017-09-07 ENCOUNTER — HOSPITAL ENCOUNTER (OUTPATIENT)
Dept: INFUSION CENTER | Facility: HOSPITAL | Age: 42
Discharge: HOME | End: 2017-09-07
Payer: COMMERCIAL

## 2017-09-07 VITALS
OXYGEN SATURATION: 97 % | SYSTOLIC BLOOD PRESSURE: 126 MMHG | RESPIRATION RATE: 20 BRPM | DIASTOLIC BLOOD PRESSURE: 69 MMHG | HEART RATE: 110 BPM | TEMPERATURE: 99.2 F

## 2017-09-07 PROCEDURE — 96374 THER/PROPH/DIAG INJ IV PUSH: CPT

## 2017-09-07 RX ORDER — CYCLOBENZAPRINE HCL 5 MG
5 TABLET ORAL DAILY
COMMUNITY
End: 2018-02-15 | Stop reason: SDUPTHER

## 2017-09-07 RX ORDER — MULTIVITAMIN
1 TABLET ORAL DAILY
COMMUNITY

## 2017-09-07 RX ADMIN — IRON SUCROSE 200 MG: 20 INJECTION, SOLUTION INTRAVENOUS at 10:18

## 2017-09-10 ENCOUNTER — HOSPITAL ENCOUNTER (EMERGENCY)
Facility: HOSPITAL | Age: 42
Discharge: LEFT BEFORE DISCHARGE | End: 2017-09-10
Attending: EMERGENCY MEDICINE | Admitting: EMERGENCY MEDICINE
Payer: COMMERCIAL

## 2017-09-10 ENCOUNTER — GENERIC CONVERSION - ENCOUNTER (OUTPATIENT)
Dept: OTHER | Facility: OTHER | Age: 42
End: 2017-09-10

## 2017-09-10 VITALS
SYSTOLIC BLOOD PRESSURE: 125 MMHG | RESPIRATION RATE: 20 BRPM | DIASTOLIC BLOOD PRESSURE: 76 MMHG | TEMPERATURE: 97.9 F | HEART RATE: 102 BPM | OXYGEN SATURATION: 98 %

## 2017-09-10 DIAGNOSIS — R10.9 ABDOMINAL PAIN: ICD-10-CM

## 2017-09-10 DIAGNOSIS — Z76.5 DRUG-SEEKING BEHAVIOR: ICD-10-CM

## 2017-09-10 DIAGNOSIS — R06.02 SOB (SHORTNESS OF BREATH): Primary | ICD-10-CM

## 2017-09-10 PROCEDURE — 99284 EMERGENCY DEPT VISIT MOD MDM: CPT

## 2017-09-10 RX ORDER — NORETHINDRONE ACETATE AND ETHINYL ESTRADIOL, AND FERROUS FUMARATE 1MG-20(24)
5 KIT ORAL 2 TIMES DAILY
COMMUNITY
End: 2018-02-14

## 2017-09-14 ENCOUNTER — HOSPITAL ENCOUNTER (OUTPATIENT)
Dept: INFUSION CENTER | Facility: HOSPITAL | Age: 42
Discharge: HOME | End: 2017-09-14
Payer: COMMERCIAL

## 2017-09-14 ENCOUNTER — GENERIC CONVERSION - ENCOUNTER (OUTPATIENT)
Dept: OTHER | Facility: OTHER | Age: 42
End: 2017-09-14

## 2017-09-14 VITALS
TEMPERATURE: 98.2 F | OXYGEN SATURATION: 98 % | HEART RATE: 87 BPM | RESPIRATION RATE: 18 BRPM | DIASTOLIC BLOOD PRESSURE: 65 MMHG | SYSTOLIC BLOOD PRESSURE: 117 MMHG

## 2017-09-14 PROCEDURE — 96365 THER/PROPH/DIAG IV INF INIT: CPT

## 2017-09-14 RX ADMIN — IRON SUCROSE 200 MG: 20 INJECTION, SOLUTION INTRAVENOUS at 10:29

## 2017-09-15 ENCOUNTER — GENERIC CONVERSION - ENCOUNTER (OUTPATIENT)
Dept: OTHER | Facility: OTHER | Age: 42
End: 2017-09-15

## 2017-09-21 ENCOUNTER — APPOINTMENT (OUTPATIENT)
Dept: LAB | Facility: CLINIC | Age: 42
End: 2017-09-21
Payer: COMMERCIAL

## 2017-09-21 ENCOUNTER — TRANSCRIBE ORDERS (OUTPATIENT)
Dept: LAB | Facility: CLINIC | Age: 42
End: 2017-09-21

## 2017-09-21 ENCOUNTER — HOSPITAL ENCOUNTER (OUTPATIENT)
Dept: INFUSION CENTER | Facility: HOSPITAL | Age: 42
Discharge: HOME | End: 2017-09-21
Payer: COMMERCIAL

## 2017-09-21 VITALS
HEART RATE: 78 BPM | TEMPERATURE: 98 F | DIASTOLIC BLOOD PRESSURE: 62 MMHG | OXYGEN SATURATION: 97 % | SYSTOLIC BLOOD PRESSURE: 114 MMHG | RESPIRATION RATE: 18 BRPM

## 2017-09-21 DIAGNOSIS — D50.9 IRON DEFICIENCY ANEMIA, UNSPECIFIED: ICD-10-CM

## 2017-09-21 DIAGNOSIS — D50.9 IRON DEFICIENCY ANEMIA, UNSPECIFIED: Primary | ICD-10-CM

## 2017-09-21 LAB
ERYTHROCYTE [DISTWIDTH] IN BLOOD BY AUTOMATED COUNT: 23 % (ref 11.6–15.1)
FERRITIN SERPL-MCNC: 132 NG/ML (ref 8–388)
HCT VFR BLD AUTO: 38.7 % (ref 34.8–46.1)
HGB BLD-MCNC: 12.3 G/DL (ref 11.5–15.4)
IRON SERPL-MCNC: 458 UG/DL (ref 50–170)
MCH RBC QN AUTO: 28.1 PG (ref 26.8–34.3)
MCHC RBC AUTO-ENTMCNC: 31.8 G/DL (ref 31.4–37.4)
MCV RBC AUTO: 88 FL (ref 82–98)
PLATELET # BLD AUTO: 334 THOUSANDS/UL (ref 149–390)
PMV BLD AUTO: 10.5 FL (ref 8.9–12.7)
RBC # BLD AUTO: 4.38 MILLION/UL (ref 3.81–5.12)
TIBC SERPL-MCNC: 392 UG/DL (ref 250–450)
WBC # BLD AUTO: 7.83 THOUSAND/UL (ref 4.31–10.16)

## 2017-09-21 PROCEDURE — 85027 COMPLETE CBC AUTOMATED: CPT

## 2017-09-21 PROCEDURE — 82728 ASSAY OF FERRITIN: CPT

## 2017-09-21 PROCEDURE — 83540 ASSAY OF IRON: CPT

## 2017-09-21 PROCEDURE — 36415 COLL VENOUS BLD VENIPUNCTURE: CPT

## 2017-09-21 PROCEDURE — 96374 THER/PROPH/DIAG INJ IV PUSH: CPT

## 2017-09-21 PROCEDURE — 83550 IRON BINDING TEST: CPT

## 2017-09-21 RX ADMIN — IRON SUCROSE 200 MG: 20 INJECTION, SOLUTION INTRAVENOUS at 09:54

## 2017-09-22 ENCOUNTER — GENERIC CONVERSION - ENCOUNTER (OUTPATIENT)
Dept: OTHER | Facility: OTHER | Age: 42
End: 2017-09-22

## 2017-09-25 ENCOUNTER — ALLSCRIPTS OFFICE VISIT (OUTPATIENT)
Dept: OTHER | Facility: OTHER | Age: 42
End: 2017-09-25

## 2017-10-05 ENCOUNTER — GENERIC CONVERSION - ENCOUNTER (OUTPATIENT)
Dept: OTHER | Facility: OTHER | Age: 42
End: 2017-10-05

## 2017-10-05 LAB
A/G RATIO (HISTORICAL): 1.6 (ref 1.2–2.2)
ALBUMIN SERPL BCP-MCNC: 4.1 G/DL (ref 3.5–5.5)
ALP SERPL-CCNC: 62 IU/L (ref 39–117)
ALT SERPL W P-5'-P-CCNC: 15 IU/L (ref 0–32)
AST SERPL W P-5'-P-CCNC: 21 IU/L (ref 0–40)
BILIRUB SERPL-MCNC: 0.2 MG/DL (ref 0–1.2)
BUN SERPL-MCNC: 19 MG/DL (ref 6–24)
BUN/CREA RATIO (HISTORICAL): 20 (ref 9–23)
CALCIUM SERPL-MCNC: 8.1 MG/DL (ref 8.7–10.2)
CHLORIDE SERPL-SCNC: 103 MMOL/L (ref 96–106)
CO2 SERPL-SCNC: 20 MMOL/L (ref 18–29)
CREAT SERPL-MCNC: 0.95 MG/DL (ref 0.57–1)
DEPRECATED RDW RBC AUTO: 21.1 % (ref 12.3–15.4)
EGFR AFRICAN AMERICAN (HISTORICAL): 85 ML/MIN/1.73
EGFR-AMERICAN CALC (HISTORICAL): 74 ML/MIN/1.73
FERRITIN SERPL-MCNC: 124 NG/ML (ref 15–150)
GLUCOSE SERPL-MCNC: 102 MG/DL (ref 65–99)
HCT VFR BLD AUTO: 39.2 % (ref 34–46.6)
HGB BLD-MCNC: 12.9 G/DL (ref 11.1–15.9)
IRON SERPL-MCNC: 105 UG/DL (ref 27–159)
MCH RBC QN AUTO: 29.2 PG (ref 26.6–33)
MCHC RBC AUTO-ENTMCNC: 32.9 G/DL (ref 31.5–35.7)
MCV RBC AUTO: 89 FL (ref 79–97)
PLATELET # BLD AUTO: 264 X10E3/UL (ref 150–379)
POTASSIUM SERPL-SCNC: 3.4 MMOL/L (ref 3.5–5.2)
RBC (HISTORICAL): 4.42 X10E6/UL (ref 3.77–5.28)
SODIUM SERPL-SCNC: 140 MMOL/L (ref 134–144)
TOT. GLOBULIN, SERUM (HISTORICAL): 2.5 G/DL (ref 1.5–4.5)
TOTAL PROTEIN (HISTORICAL): 6.6 G/DL (ref 6–8.5)
WBC # BLD AUTO: 7.9 X10E3/UL (ref 3.4–10.8)

## 2017-10-06 ENCOUNTER — GENERIC CONVERSION - ENCOUNTER (OUTPATIENT)
Dept: OTHER | Facility: OTHER | Age: 42
End: 2017-10-06

## 2017-10-09 ENCOUNTER — GENERIC CONVERSION - ENCOUNTER (OUTPATIENT)
Dept: OTHER | Facility: OTHER | Age: 42
End: 2017-10-09

## 2017-10-10 ENCOUNTER — GENERIC CONVERSION - ENCOUNTER (OUTPATIENT)
Dept: OTHER | Facility: OTHER | Age: 42
End: 2017-10-10

## 2017-10-10 LAB
BACTERIA UR QL AUTO: ABNORMAL
BILIRUB UR QL STRIP: NEGATIVE
COLOR UR: YELLOW
COMMENT (HISTORICAL): ABNORMAL
CULTURE RESULT (HISTORICAL): ABNORMAL
FECAL OCCULT BLOOD DIAGNOSTIC (HISTORICAL): ABNORMAL
GLUCOSE (HISTORICAL): NEGATIVE
KETONES UR STRIP-MCNC: NEGATIVE MG/DL
LEUKOCYTE ESTERASE UR QL STRIP: ABNORMAL
MICROSCOPIC EXAMINATION (HISTORICAL): ABNORMAL
MISCELLANEOUS LAB TEST RESULT (HISTORICAL): ABNORMAL
NITRITE UR QL STRIP: POSITIVE
NON-SQ EPI CELLS URNS QL MICRO: ABNORMAL /HPF
PH UR STRIP.AUTO: 6 [PH] (ref 5–7.5)
PROT UR STRIP-MCNC: NEGATIVE MG/DL
RBC (HISTORICAL): >30 /HPF
SP GR UR STRIP.AUTO: 1.01 (ref 1–1.03)
SUSCEP. REFLEX (HISTORICAL): ABNORMAL
URINALYSIS (UA) (HISTORICAL): ABNORMAL
UROBILINOGEN UR QL STRIP.AUTO: 0.2 EU/DL (ref 0.2–1)
WBC # BLD AUTO: >30 /HPF

## 2017-10-11 ENCOUNTER — ALLSCRIPTS OFFICE VISIT (OUTPATIENT)
Dept: OTHER | Facility: OTHER | Age: 42
End: 2017-10-11

## 2017-10-31 ENCOUNTER — GENERIC CONVERSION - ENCOUNTER (OUTPATIENT)
Dept: OTHER | Facility: OTHER | Age: 42
End: 2017-10-31

## 2017-11-08 ENCOUNTER — ALLSCRIPTS OFFICE VISIT (OUTPATIENT)
Dept: OTHER | Facility: OTHER | Age: 42
End: 2017-11-08

## 2017-11-10 NOTE — PROGRESS NOTES
Assessment  1  Abdominal pain, epigastric (789 06) (R10 13)   2  Nausea (787 02) (R11 0)    Plan  Abdominal pain, epigastric, Nausea    · Carafate 1 GM/10ML Oral Suspension; TAKE 10 ML 3 TIMES DAILY   Rx By: Marya Cali; Dispense: 30 Days ; #:3 X 420 ML Bottle; Refill: 6;Abdominal pain, epigastric, Nausea; KAY = N; Verified Transmission to [address]; Last Updated By: System, [employer]; 11/8/2017 1:54:50 PM  Esophagitis    · Omeprazole 40 MG Oral Capsule Delayed Release; take 1 capsule by mouthonce daily   Rx By: Trevon Szymanski; Dispense: 30 Days ; #:30 Capsule Delayed Release; Refill: 6;Esophagitis; KAY = N; Verified Transmission to [address] [place] [address] [place]; Last Updated By: Marya Cali; 11/8/2017 1:53:54 PM  Nausea and vomiting    · Ondansetron 4 MG Oral Tablet Disintegrating; TAKE 4 MG Every 8 hours PRNpersisting nausea/vomitin As Directed   Rx By: Trevon Szymanski; Dispense: 0 Days ; #:15 Tablet Disintegrating; Refill: 1;Nausea and vomiting; KAY = N; Verified Transmission to [address]; Last Updated By: Marya Cali; 11/8/2017 1:53:54 PM    Discussion/Summary  Discussion Summary:   Abdominal pain and dyspepsia- appears to be most likely functional symptoms from irritable bowel syndrome  Should consider adhesions  Patient has history of gastric bypass surgery and small bowel resection for intussusception  Continue omeprazole and add Carafate  Patient was explained about the lifestyle and dietary modifications  Advised to avoid fatty foods, chocolates, caffeine, alcohol and any other triggering foods  Avoid eating for at least 3 hours before going to bed  Continue symptomatic treatment with Zofran  Counseling Documentation With Imm: The patient was counseled regarding instructions for management,-- risk factor reductions,-- patient and family education,-- risks and benefits of treatment options,-- importance of compliance with treatment  Chief Complaint  Chief Complaint Free Text Note Form:      History of Present Illness  HPI: Guille Anderson came in for follow-up  She reports having recurrent episodes of epigastric pain and nausea  She takes omeprazole 40 mg daily  She had hysterectomy last month for fibroids  Reports having regular bowel movements except occasional diarrhea with certain foods  Good appetite, no recent weight loss  Denies any use of NSAIDs  did EGD and colonoscopy on her in December 2016  History Reviewed: The history was obtained today from the patient and I agree with the documented history  Review of Systems  Complete-Female GI Adult:  Constitutional: No fever, no chills, feels well, no tiredness, no recent weight gain or weight loss  Eyes: No complaints of eye pain, no red eyes, no eyesight problems, no discharge, no dry eyes, no itching of eyes  ENT: no complaints of earache, no loss of hearing, no nose bleeds, no nasal discharge, no sore throat, no hoarseness  Cardiovascular: No complaints of slow heart rate, no fast heart rate, no chest pain, no palpitations, no leg claudication, no lower extremity edema  Respiratory: No complaints of shortness of breath, no wheezing, no cough, no SOB on exertion, no orthopnea, no PND  Gastrointestinal: as noted in HPI  Genitourinary: No complaints of dysuria, no incontinence, no pelvic pain, no dysmenorrhea, no vaginal discharge or bleeding  Musculoskeletal: No complaints of arthralgias, no myalgias, no joint swelling or stiffness, no limb pain or swelling  Integumentary: No complaints of skin rash or lesions, no itching, no skin wounds, no breast pain or lump  Neurological: No complaints of headache, no confusion, no convulsions, no numbness, no dizziness or fainting, no tingling, no limb weakness, no difficulty walking  Psychiatric: Not suicidal, no sleep disturbance, no anxiety or depression, no change in personality, no emotional problems    Endocrine: No complaints of proptosis, no hot flashes, no muscle weakness, no deepening of the voice, no feelings of weakness  Hematologic/Lymphatic: No complaints of swollen glands, no swollen glands in the neck, does not bleed easily, does not bruise easily  Active Problems    1  Abnormal menstrual cycle (626 9) (N92 6)   2  Acute lower UTI (599 0) (N39 0)   3  Acute otitis externa of left ear (380 10) (H60 502)   4  Acute otitis media (382 9) (H66 90)   5  Acute pharyngitis (462) (J02 9)   6  Adult hypothyroidism (244 9) (E03 9)   7  Allergic rhinitis (477 9) (J30 9)   8  Analgesic rebound headache (339 3,E935 9) (T39 91XA,G44 40)   9  Asthma, allergic, mild intermittent, uncomplicated (528 07) (G00 52)   10  Ceruminosis, left (380 4) (H61 22)   11  Cervicalgia (723 1) (M54 2)   12  Change in bowel habits (787 99) (R19 4)   13  Chest pain, localized (786 59) (R07 89)   14  Chest wall tenderness (786 52) (R07 89)   15  Chronic diarrhea (787 91) (K52 9)   16  Chronic fatigue (780 79) (R53 82)   17  Chronic low back pain (724 2,338 29) (M54 5,G89 29)   18  Chronic post-operative pain (338 28) (G89 28)   19  Continuous severe abdominal pain (789 00) (R10 9)   20  CRP elevated (790 95) (R79 82)   21  Depression with anxiety (300 4) (F41 8)   22  Drug therapy (V58 69) (Z79 899)   23  Encounter for Papanicolaou cervical smear to confirm findings of recent normal smear  following initial abnormal smear (V72 32) (Z01 42)   24  Esophagitis (530 10) (K20 9)   25  Fibromyalgia (729 1) (M79 7)   26  GERD without esophagitis (530 81) (K21 9)   27  Hyperlipidemia (272 4) (E78 5)   28  Hypocalcemia (275 41) (E83 51)   29  Iron deficiency anemia (280 9) (D50 9)   30  Irritable bowel syndrome with diarrhea (564 1) (K58 0)   31  Left ear pain (388 70) (H92 02)   32  Migraine, chronic, without aura (346 70) (G43 709)   33  Myalgia (729 1) (M79 1)   34  Nausea and vomiting (787 01) (R11 2)   35  Pelvic pain in female (625 9) (R10 2)   36   Positive LETY (antinuclear antibody) (795 79) (R76 8)   37  Post-concussion headache (339 20) (G44 309)   38  Pre-op evaluation (V72 84) (Z01 818)   39  Reactive airway disease (493 90) (J45 909)   40  Status post surgery (V45 89) (Z98 890)   41  Tinnitus (388 30) (H93 19)   42  Uterine fibroid (218 9) (D25 9)    Past Medical History  1  History of Acute left otitis media (382 9) (H66 92)   2  History of Adenopathy, cervical (785 6) (R59 0)   3  History of Adhesion, postoperative   4  History of Concussion, without loss of consciousness, subsequent encounter   5  History of Fever of unknown origin (FUO) (780 60) (R50 9)   6  History of Flu vaccine need (V04 81) (Z23)   7  History of Frequent headaches (784 0) (R51)   8  History of Hip pain (719 45) (M25 559)   9  History of abdominal pain (V13 89) (Z87 898)   10  History of depression (V11 8) (Z86 59)   11  History of esophageal reflux (V12 79) (Z87 19)   12  History of gastritis (V12 79) (Z87 19)   13  History of malignant neoplasm of thyroid (V10 87) (Z85 850)   14  History of migraine with aura (V12 49) (Z86 69)   15  History of nausea (V12 79) (Z87 898)   16  History of Injury of left foot, initial encounter (959 7) (P07 266H)   17  History of Laceration of finger, index (883 0) (S61 218A)   18  History of Loss of appetite (783 0) (R63 0)   19  History of Loss of weight (783 21) (R63 4)   20  History of Myositis of multiple sites, unspecified myositis type (729 1) (M60 9)   21  History of Sinus headache (784 0) (R51)   22  History of Visit for suture removal (V58 32) (Z48 02)   23  History of Worsening headaches (784 0) (R51)  Active Problems And Past Medical History Reviewed: The active problems and past medical history were reviewed and updated today  Surgical History    1  History of Biopsy Endometrial, Without Cervical Dilation   2  History of Cholecystectomy   3  History of Gastric Surgery For Morbid Obesity Bypass With Torey-en-Y   4   History of Thyroid Surgery Total Thyroidectomy  Surgical History Reviewed: The surgical history was reviewed and updated today  Family History  Mother    1  Denied: Family history of Colon cancer   2  Denied: Family history of Crohn's disease without complication, unspecified gastrointestinal tract location   3  Denied: Family history of liver disease   4  Family history of systemic lupus erythematosus (V19 4) (Z82 69)  Father    5  Denied: Family history of Colon cancer   6  Denied: Family history of Crohn's disease without complication, unspecified gastrointestinal tract location   7  Family history of Diabetes mellitus due to underlying condition with both eyes affected by mild nonproliferative retinopathy without macular edema, unspecified long term insulin use status   8  Family history of liver disease (V18 59) (Z83 79)  Maternal Grandmother    9  Family history of rheumatoid arthritis (V17 7) (Z82 61)  Family History Reviewed: The family history was reviewed and updated today  Social History     · Drinks caffeinated tea   · Denied: History of Drug use   ·    · Never a smoker   · Occasional alcohol use  Social History Reviewed: The social history was reviewed and updated today  The social history was reviewed and is unchanged  Current Meds   1  Acetaminophen 500 MG Oral Tablet; TAKE TABLET  TAKES TWO TABLETS AS NEEDED AND ONE TABLET AT BEDTIME; Therapy: (Recorded:08Nov2017) to Recorded   2  Albuterol Sulfate (2 5 MG/3ML) 0 083% Inhalation Nebulization Solution; USE 1 UNIT DOSE EVERY 4-6 HOURS AS NEEDED FOR WHEEZING ; Therapy: 77Wcw6465 to (Last Rx:10Sep2017)  Requested for: 10Sep2017 Ordered   3  Ambien 10 MG Oral Tablet; TAKE 1 TABLET Bedtime PRN; Therapy: 81SRA0579 to Recorded   4  Amitriptyline HCl - 75 MG Oral Tablet; TAKE 1 TABLET AT BEDTIME; Therapy: 50IZM0083 to (Evaluate:08Sep2017)  Requested for: 13RKB6503; Last Rx:32Dmy4820 Ordered   5   Aspirin EC 81 MG Oral Tablet Delayed Release; TAKE 1 TABLET DAILY AS DIRECTED; Therapy: 76KWW7395 to (Evaluate:58Lvl4065)  Requested for: 86Pja0731; Last Rx:42Teo4592 Ordered   6  BuPROPion HCl ER (XL) 300 MG Oral Tablet Extended Release 24 Hour; TAKE 1 TABLET DAILY; Therapy: 54IAD1054 to Recorded   7  Calcitriol 0 25 MCG Oral Capsule; TAKE CAPSULE 3 times daily; Therapy: 91VAE2563 to Recorded   8  Calcium 600 MG Oral Tablet; TAKE 3 TABLET Three times daily  Requested for: 33ZQS0475; Last Rx:31Hbp1751 Ordered   9  Carafate 1 GM/10ML Oral Suspension; TAKE 10 ML 3 TIMES DAILY; Therapy: (21 ) to Recorded   10  Cetirizine HCl - 10 MG Oral Tablet; TAKE 1 TABLET DAILY AS NEEDED; Therapy: 06ZBB3589 to (Amaury Quinteros)  Requested for: 85Iea0869; Last  Rx:69Dqh2416 Ordered   11  ClonazePAM 1 MG Oral Tablet; Take 1 tablet daily; Therapy: 32AQF2170 to Recorded   12  Creon 58456-53048 UNIT Oral Capsule Delayed Release Particles; TAKE 1 CAPSULE  Before meals; Therapy: 85ISV8555 to (Evaluate:55Lnq0189)  Requested for: 91GWS3951; Last  Rx:29Mar2017 Ordered   13  Flexeril 5 MG TABS; TAKE 1 TABLET AT BEDTIME; Therapy: ([phone]) to Recorded   14  Folic Acid 1 MG Oral Tablet; TAKE 1 TABLET DAILY; Therapy: 16WYQ4139 to ([phone])  Requested for: 45Wec7311; Last  Rx:25Utd8128 Ordered   15  Gabapentin 300 MG Oral Capsule; TAKE 1 CAPSULE  TWICE DAILY  AND 2 CAPSULES  IN THE EVENING; Therapy: 52AZK0076 to (Last QI:60YDW4679)  Requested for: 73BBF6378 Ordered   16  Levothyroxine Sodium 175 MCG Oral Tablet; Take 1 tablet daily five days per week; Therapy: 48UBI5604 to Recorded   17  Magnesium 500 MG Oral Capsule; TAKE CAPSULE 3 times daily; Therapy: 07BJO2954 to Recorded   18  Montelukast Sodium 10 MG Oral Tablet; TAKE 1 TABLET DAILY AS DIRECTED; Therapy: 72HKS8078 to (Chanda Meyers)  Requested for: 29Oct2017; Last  Rx:29Oct2017 Ordered   19  Multi For Her Oral Tablet; TAKE 1 TABLET DAILY; Therapy: 84MBA4504 to Recorded   20   Omeprazole 40 MG Oral Capsule Delayed Release; take 1 capsule by mouth once daily; Therapy: 18EMH2359 to (Evaluate:20Vmr8802)  Requested for: 92RYO4082; Last  Rx:16Ydm2353 Ordered   21  Ondansetron 4 MG Oral Tablet Disintegrating; TAKE 4 MG Every 8 hours PRN persisting  nausea/vomitin As Directed; Therapy: 88Fjl0610 to (Last Rx:02Nov2017)  Requested for: 43PDB8082 Ordered   22  Pristiq 50 MG Oral Tablet Extended Release 24 Hour; TAKE TABLET Daily; Therapy: 28NPH3073 to Recorded   23  Relpax 40 MG Oral Tablet; TAKE 1 TABLET AT ONSET OF severe headache  MAY  REPEAT IN  2 HOURS  MAX 2 DOSES/24 HOURS, NO MORE THAN 3 DAYS PER WEEK ;  Therapy: 50NXR6300 to (Last Rx:87Dws7662)  Requested for: 98VNV8367 Ordered   24  Topiramate 50 MG Oral Tablet; TAKE 1 TABLET DAILY; Therapy: ([phone]) to Recorded   25  Tramadol-Acetaminophen 37 5-325 MG Oral Tablet; 1 Tablet at bedtime as needed; Therapy: 17BIP7292 to (Last Rx:31Oct2017) Ordered   26  Ventolin  (90 Base) MCG/ACT Inhalation Aerosol Solution; INHALE TWO PUFFS  EVERY SIX HOURS AS NEEDED FOR COUGH/WHEEZE;  Therapy: 90COY6834 to (Last Rx:20Oct2016) Ordered   27  Vitamin B-12 1000 MCG Oral Tablet; Take 1 tablet daily; Therapy: 28HGX5721 to (Last Rx:71Nfm5259)  Requested for: 34Koq0109 Ordered   28  Vitamin D 96953 UNIT CAPS; TAKE 1 CAPSULE WEEKLY; Therapy: (Recorded:40Uxd7869) to Recorded  Medication List Reviewed: The medication list was reviewed and updated today  Allergies  1  Adhesive Tape TAPE   2  Compazine TABS   3  Demerol SOLN    Vitals  Vital Signs    Recorded: 74JWE8942 01:28PM   Temperature 97 4 F   Heart Rate 88   Respiration 16   Systolic 286   Diastolic 60   Height 5 ft 5 in   Weight 181 lb 4 oz   BMI Calculated 30 16   BSA Calculated 1 9   O2 Saturation 98       Physical Exam   Constitutional  General appearance: No acute distress, well appearing and well nourished  Eyes  Conjunctiva and lids: No swelling, erythema or discharge     Pupils and irises: Equal, round and reactive to light  Ears, Nose, Mouth, and Throat  External inspection of ears and nose: Normal    Oropharynx: Normal with no erythema, edema, exudate or lesions  Pulmonary  Respiratory effort: No increased work of breathing or signs of respiratory distress  Auscultation of lungs: Clear to auscultation  Cardiovascular  Palpation of heart: Normal PMI, no thrills  Auscultation of heart: Normal rate and rhythm, normal S1 and S2, without murmurs  Examination of extremities for edema and/or varicosities: Normal    Carotid pulses: Normal    Abdomen  Abdomen: Non-tender, no masses  -- well healed scars  Liver and spleen: No hepatomegaly or splenomegaly  Lymphatic  Palpation of lymph nodes in neck: No lymphadenopathy  Musculoskeletal  Gait and station: Normal    Digits and nails: Normal without clubbing or cyanosis  Inspection/palpation of joints, bones, and muscles: Normal    Skin  Skin and subcutaneous tissue: Normal without rashes or lesions     Psychiatric  Orientation to person, place, and time: Normal    Mood and affect: Normal          Signatures   Electronically signed by : STARLA Campa ; Nov 8 2017  2:00PM EST                       (Author)

## 2017-12-08 ENCOUNTER — GENERIC CONVERSION - ENCOUNTER (OUTPATIENT)
Dept: OTHER | Facility: OTHER | Age: 42
End: 2017-12-08

## 2017-12-11 ENCOUNTER — GENERIC CONVERSION - ENCOUNTER (OUTPATIENT)
Dept: OTHER | Facility: OTHER | Age: 42
End: 2017-12-11

## 2017-12-13 ENCOUNTER — GENERIC CONVERSION - ENCOUNTER (OUTPATIENT)
Dept: FAMILY MEDICINE CLINIC | Facility: CLINIC | Age: 42
End: 2017-12-13

## 2018-01-09 NOTE — MISCELLANEOUS
Message  Return to work or school:   Joseph Young is under my professional care  She was seen in my office on 9/01/17    She is not able to return to work until re-eval 9/25/17     Treatment for mult conditions in progress  Angely MI        Signatures   Electronically signed by : SHMUEL Calle; Sep 15 2017  8:23AM EST                       (Author)

## 2018-01-10 NOTE — RESULT NOTES
Discussion/Summary   u/s shows 5 uterine fibroids  please make appt with gyn as discussed  let us know if you need a referral once you have an appt  Verified Results  * US PELVIS COMPLETE [facility] AND TRANSVAGINAL) 21Jun2017 07:27AM Farhad Cortez Order Number: [id number]    - Patient Instructions: To schedule this appointment, please contact Central Scheduling at [phone]  Test Name Result Flag Reference   US PELVIS COMPLETE (TRANSABDOMINAL AND TRANSVAGINAL) (Report)     PELVIC ULTRASOUND, COMPLETE     INDICATION: 45-year-old woman with fibroids and irregular menses  LMP was 5/31/2017  COMPARISON: None  TECHNIQUE:  Transabdominal pelvic ultrasound was performed in sagittal and transverse planes with a curvilinear transducer  Additional transvaginal imaging was performed to better evaluate the endometrium and ovaries  Imaging included volumetric    sweeps as well as traditional still imaging technique  FINDINGS:     UTERUS:   Position: Anteverted and anteflexed  Size: 11 7 x 6 8 x 6 3 cm  Myometrium: Multiple leiomyomas, 5 of which were measured:   1  Posterior corpus: 2 6 x 1 5 x 2 8 cm    2  Posterior fundus: 1 5 x 1 5 x 1 9 cm   3  Left anterior fundus: 2 6 x 3 1 x 2 8 cm  4  Right side of fundus: 3 1 x 2 7 x 3 0 cm    5  Anterior lower uterine segment submucosal: 0 9 x 0 6 x 1 0 cm  Myometrial echotexture otherwise normal     Cervix: Normal in appearance  ENDOMETRIUM:    Thickness: Normal in thickness, measuring 1 5 cm  in maximal thickness  Echotexture: Normal and homogenous in echogenicity with no evidence of endometrial mass or fluid collection  OVARIES/ADNEXA:   Right ovary: 3 3 x 3 0 x 3 6 cm  No suspicious right ovarian abnormality  2 8 x 2 7 x 2 1 cm cyst    Doppler flow within normal limits  Left ovary: 2 9 x 1 5 x 2 9 cm  No suspicious left ovarian abnormality  2 2 x 1 2 x 2 1 cm cyst    Doppler flow within normal limits       No suspicious adnexal mass  Free fluid: None  IMPRESSION:      Multiple uterine fibroids            Workstation performed: [id number]     Signed by:   Meet Obrien MD   6/21/17       Signatures   Electronically signed by : SHMUEL Riggins; Jun 22 2017 10:23AM EST                       (Author)

## 2018-01-10 NOTE — RESULT NOTES
Message    Cat to call patient      Stool for fat is positive which could be due to history of gastric bypass surgery and small bowel resection  Patient to eat low-fat diet and take Pancreatic enzyme supplements Zenpep 15852 Units with each meal and snack    ADDENDUM: I called patient multiple times over the last several days with no answer but finally got in touch with her this morning  She did not start taking Viberzi yet, and I advised her not to start due to the recent FDA warning  I also advised her about the stool fat results and sent Rx for Jennifer Cruz as described above, and encouraged her to make appointment with our office in 1-2 months to discuss further options and monitor treatment  She expressed agreement                 Verified Results  (1) FAT, FECES QUALITATIVE [id number] 05:00PM Regis Jones     Test Name Result Flag Reference   Fats, Neutral Increased     Normal (<60 Droplets/HPF)   Fats, Total Increased     Normal (<100 Droplets/HPF)

## 2018-01-10 NOTE — MISCELLANEOUS
Message  Return to work or school:   Vijay Mazariegos is under my professional care  She was seen in my office on 9/1/17       Excused 9/14-9/20/17  Felix MI        Signatures   Electronically signed by : SHMUEL Bradford; Sep 14 2017  8:18AM EST                       (Author)

## 2018-01-10 NOTE — RESULT NOTES
Verified Results  * CT ABDOMEN PELVIS W CONTRAST [id number] 10:06AM Galilea Diaz     Test Name Result Flag Reference   CT ABDOMEN PELVIS W CONTRAST (Report)     CT ABDOMEN AND PELVIS WITH IV CONTRAST     INDICATION: Mid abdominal pain  History of gastric bypass  COMPARISON: CT abdomen and pelvis from December 4, 2016     TECHNIQUE: CT examination of the abdomen and pelvis  Axial, sagittal and coronal reformatted projections were created  This examination, like all CT scans performed in the [facility], was performed utilizing techniques to    minimize radiation dose exposure, including the use of iterative reconstruction and automated exposure control  IV Contrast: iohexol (OMNIPAQUE) 350 MG/ML injection (MULTI-DOSE) 100 mL Note: (SINGLE DOSE/MULTI DOSE) information refers to the container from which the contrast was acquired  Contrast was injected one time intravenously without immediate    complication  Enteric Contrast: Enteric contrast was administered  FINDINGS:     ABDOMEN     LOWER CHEST: Calcified granuloma in the right middle lobe  Lung bases and included pleural spaces otherwise clear  Mild mural thickening in the distal esophagus, suggesting esophagitis  LIVER/BILIARY TREE: 7 mm low-attenuation mass in the posterior segment of the right lobe, unchanged since the earlier study, most likely a cyst or hemangioma  No other liver mass  Bile ducts normal      GALLBLADDER: Postcholecystectomy  SPLEEN: Scattered calcified granulomas  Otherwise normal      PANCREAS: Unremarkable  ADRENAL GLANDS: Unremarkable  KIDNEYS/URETERS: Unremarkable  No hydronephrosis  STOMACH AND BOWEL: Torey-en-Y gastric bypass  No enteric contrast in the excluded stomach  Gastric pouch and anastomosis appear to be intact  Small bowel otherwise normal      APPENDIX: No findings to suggest appendicitis  ABDOMINOPELVIC CAVITY: No lymphadenopathy or mass  No ascites   No extraluminal gas  VESSELS: Unremarkable for patient's age  PELVIS     REPRODUCTIVE ORGANS: Several small uterine leiomyomas  Bilateral Essure implants present  No ovarian masses  URINARY BLADDER: Unremarkable  ABDOMINAL WALL/INGUINAL REGIONS: Unremarkable  OSSEOUS STRUCTURES: No acute fracture or destructive osseous lesion  IMPRESSION:     No evidence of acute abnormality in the abdomen or pelvis  Workstation performed: [id number]     Signed by:   Monica Leal MD   2/15/17       Discussion/Summary   ct scan shows no acute process  It does show esophagitis  She was advised to resume carafate and omeprazole   She has follow up ov on monday     Signatures   Electronically signed by : SHMUEL Souza; Feb 16 2017  1:18PM EST                       (Author)

## 2018-01-10 NOTE — RESULT NOTES
Verified Results  * XR FOOT 3+ VIEW LEFT 13Jan2017 03:08PM Debi Neither Order Number: [id number]     Test Name Result Flag Reference   XR FOOT 3+ VW LEFT (Report)     LEFT FOOT     INDICATION: Injured great toe  COMPARISON: None     VIEWS: 3; 3 images     FINDINGS:     There is no acute fracture or dislocation  Inferior calcaneal spur is present  No lytic or blastic lesions are seen  Soft tissue swelling about the distal aspect of the great toe  IMPRESSION:     No acute osseous abnormality         Workstation performed: [id number]     Signed by:   Buffy Joe MD   1/13/17       Signatures   Electronically signed by : SHMUEL Jeffrey; Jan 13 2017  3:51PM EST                       (Author)

## 2018-01-11 NOTE — RESULT NOTES
Message    Celiac disease panel is negative  called pt and left message  Moon 30     Patient aware of results has many questions for Dr Yosef Adams  SG             Verified Results  ([facility]) Celiac Disease Panel [id number] 02:21PM Marimar Portal     Test Name Result Flag Reference   Endomysial Antibody IgA Negative  Negative   t-Transglutaminase (tTG) IgA <2 U/mL  0-3   Negative        0 -  3                                               Weak Positive   4 - 10                                               Positive           >10                  Tissue Transglutaminase (tTG) has been identified                  as the endomysial antigen  Studies have demonstr-                  ated that endomysial IgA antibodies have over 99%                  specificity for gluten sensitive enteropathy     Immunoglobulin A, Qn, Serum 175 mg/dL

## 2018-01-11 NOTE — RESULT NOTES
Message    Cat to call patient      Small bowel and colon biopsies are benign and negative for any inflammation  Patient symptoms are most likely functional from IBS  Will give a trial with rifaximin 550 mg 3 times a day for 2 weeks and then office visit in 3 months     ADDENDUM: I spoke with patient regarding results of her biopsies; I sent Rx for rifaximin TID x 2 weeks to her pharmacy and tasked our office staff to have her follow up in 3 months           Verified Results  (1) TISSUE EXAM [id number] 12:00PM Belle Fitz     Test Name Result Flag Reference   LAB AP CASE REPORT (Report)     Surgical Pathology Report             Case: [id number]                   Authorizing Provider: John Simpson MD     Collected:      12/12/2016 1200        Ordering Location:   [facility]  Received:      12/12/2016 [address] [facility]                                     Pathologist:      Paris Mccullough MD                                 Specimens:  A) - Duodenum, Small bowel biopsies                                  B) - Colon, Random colon biopsies   LAB AP FINAL DIAGNOSIS (Report)     A  Small bowel (biopsy):    - Small bowel mucosa with no significant pathologic abnormalities  - No villous atrophy, increased intraepithelial lymphocytes or crypt   hyperplasia to suggest     malabsorptive enteropathy     - No active inflammation, granulomas, organisms, dysplasia or neoplasia   identified  B  Colon (random biopsies):    - Colonic mucosa with no significant pathologic abnormalities  - Prominent lymphoid aggregates and melanosis coli noted  - No active inflammation or histologic evidence of a microscopic   (lymphocytic)     or collagenous colitis noted  - No granulomas, dysplasia or neoplasia identified  Electronically signed by Paris Mccullough MD on 12/14/2016 at 2:29 PM   LAB AP NOTE      Interpretation performed at [facility], [address] [place] [address], [address] [place], [place] [id number]     LAB AP SURGICAL ADDITIONAL INFORMATION (Report)     These tests were developed and their performance characteristics   determined by [facility] or [email or web address]  They may not be cleared or approved by the [place]  Food and   Drug Administration  The FDA has determined that such clearance or   approval is not necessary  These tests are used for clinical purposes  They should not be regarded as investigational or for research  This   laboratory has been approved by [place] [id number], designated as a high-complexity   laboratory and is qualified to perform these tests  LAB AP GROSS DESCRIPTION (Report)     A  The specimen is received in formalin, labeled with the patient's name   and hospital number, and is designated small bowel biopsies  The   specimen consists of multiple tan soft tissue fragments measuring in   aggregate 0 5 x 0 5 x 0 1 cm  Entirely submitted  One cassette  Note: The estimated total formalin fixation time based upon information   provided by the submitting clinician and the standard processing schedule   is 16 5 hours  B  The specimen is received in formalin, labeled with the patient's name   and hospital number, and is designated random colon biopsies  The   specimen consists of multiple tan soft tissue fragments measuring in   aggregate 0 5 x 0 5 x 0 1 cm  Entirely submitted  One cassette  Note: The estimated total formalin fixation time based upon information   provided by the submitting clinician and the standard processing schedule   is 16 25 hours  MAC   LAB AP CLINICAL INFORMATION      Anorexia  ??? Abdominal pain  ???  Abnormal weight loss  ??? Noninfective gastroenteritis and colitis  ???  Gastro-esophageal reflux disease without esophagitis

## 2018-01-11 NOTE — MISCELLANEOUS
Message  Return to work or school:   Agata Hardin is under my professional care  She was seen in my office on 8/11/17   She is able to return to work on  8/21/17 if chest pain improved       Erendira MI  Signatures   Electronically signed by : SHMUEL Diaz;  Aug 15 2017  5:51PM EST                       (Author)

## 2018-01-11 NOTE — RESULT NOTES
Discussion/Summary   urine +  antibiotic sent to pharm     Verified Results  [facility]) UA/M w/rflx Culture, Routine [id number] 10:13AM Tory Kaiser     Test Name Result Flag Reference   Specific Gravity 1 015  1 005-1 030   pH 6 0  5 0-7 5   Urine-Color Yellow  Yellow   Appearance Cloudy A Clear   WBC Esterase 3+ A Negative   Protein Negative  Negative/Trace   Glucose Negative  Negative   Ketones Negative  Negative   Occult Blood 3+ A Negative   Bilirubin Negative  Negative   Urobilinogen,Semi-Qn 0 2 EU/dL  0 2-1 0   Nitrite, Urine Positive A Negative   Microscopic Examination See below:     Urinalysis Reflex Comment     This specimen has reflexed to a Urine Culture  WBC >30 /hpf A 0 -  5   RBC >30 /hpf A 0 -  2   Epithelial Cells (non renal) 0-10 /hpf  0 - 10   Bacteria Moderate A None seen/Few   Urine Culture, Routine Final report A    Result 1 Escherichia coli A    Greater than 100,000 colony forming units per mL  Cefazolin <=4 ug/mL  Cefazolin with an MUKUND <=16 predicts susceptibility to the oral agents  cefaclor, cefdinir, cefpodoxime, cefprozil, cefuroxime, cephalexin,  and loracarbef when used for therapy of uncomplicated urinary tract  infections due to E  coli, Klebsiella pneumoniae, and Proteus  mirabilis     Antimicrobial Susceptibility Comment     ** S = Susceptible; I = Intermediate; R = Resistant **                     P = Positive; N = Negative              MICS are expressed in micrograms per mL     Antibiotic                 RSLT#1    RSLT#2    RSLT#3    RSLT#4  Amoxicillin/Clavulanic Acid    S  Ampicillin                     S  Cefepime                       S  Ceftriaxone                    S  Cefuroxime                     S  Cephalothin                    I  Ciprofloxacin                  S  Ertapenem                      S  Gentamicin                     S  Imipenem                       S  Levofloxacin                   S  Nitrofurantoin                 S  Piperacillin S  Tetracycline                   S  Tobramycin                     S  Trimethoprim/Sulfa             S       Plan  Acute lower UTI    · Cefdinir 300 MG Oral Capsule; TAKE 1 CAPSULE TWICE DAILY

## 2018-01-12 VITALS
DIASTOLIC BLOOD PRESSURE: 75 MMHG | HEIGHT: 66 IN | HEART RATE: 105 BPM | SYSTOLIC BLOOD PRESSURE: 119 MMHG | WEIGHT: 156 LBS | BODY MASS INDEX: 25.07 KG/M2

## 2018-01-12 VITALS
SYSTOLIC BLOOD PRESSURE: 100 MMHG | HEART RATE: 84 BPM | RESPIRATION RATE: 16 BRPM | TEMPERATURE: 99.3 F | DIASTOLIC BLOOD PRESSURE: 60 MMHG

## 2018-01-12 VITALS
HEIGHT: 66 IN | WEIGHT: 154 LBS | BODY MASS INDEX: 24.75 KG/M2 | SYSTOLIC BLOOD PRESSURE: 120 MMHG | OXYGEN SATURATION: 99 % | DIASTOLIC BLOOD PRESSURE: 76 MMHG | HEART RATE: 87 BPM | TEMPERATURE: 98.8 F

## 2018-01-12 VITALS
TEMPERATURE: 98.6 F | DIASTOLIC BLOOD PRESSURE: 60 MMHG | RESPIRATION RATE: 14 BRPM | SYSTOLIC BLOOD PRESSURE: 102 MMHG | HEART RATE: 84 BPM

## 2018-01-12 VITALS
SYSTOLIC BLOOD PRESSURE: 110 MMHG | DIASTOLIC BLOOD PRESSURE: 70 MMHG | RESPIRATION RATE: 14 BRPM | HEART RATE: 84 BPM | BODY MASS INDEX: 26.03 KG/M2 | TEMPERATURE: 98.6 F | WEIGHT: 162 LBS | HEIGHT: 66 IN

## 2018-01-12 VITALS
BODY MASS INDEX: 28.49 KG/M2 | SYSTOLIC BLOOD PRESSURE: 110 MMHG | TEMPERATURE: 98.5 F | HEART RATE: 78 BPM | HEIGHT: 65 IN | DIASTOLIC BLOOD PRESSURE: 70 MMHG | RESPIRATION RATE: 14 BRPM | WEIGHT: 171 LBS

## 2018-01-12 NOTE — MISCELLANEOUS
Message  [facility] [address] [facility]  Attn: Jared Farooq  LMQ:[phone]    RE: Steffanie Castillo Case # [id number]    To whom it may concern:    Please be aware that Shashank Mcgee, case #[id number], is the sole caregiver of my patient, Abdelrahman Funes  Please allow exemption for Mr  Steffanie Castillo from [facility] work activity requirements from December 15, 2016 until December 15, 2017 (up to a year if needed) 1  to care for Karlee Moore  Thank you for your consideration in this matter  Please contact me with any questions      Waqas MI-C  [phone]       1 Amended By: Heron Mohs; Dec 14 2016 10:37 AM EST    Signatures   Electronically signed by : SHMUEL Nascimento; Dec 14 2016 10:38AM EST                       (Author)

## 2018-01-12 NOTE — RESULT NOTES
Discussion/Summary   labs confirm iron deficiency anemia  ferrous sulfate 150 twice daily ordered  will recheck labs in 3 months     Verified Results  (1) COMPREHENSIVE METABOLIC PANEL [id number] 88:49ZU [facility] [employer]     Test Name Result Flag Reference   Glucose, Serum 66 mg/dL  65-99   BUN 17 mg/dL  6-24   Creatinine, Serum 0 83 mg/dL  0 57-1 00   BUN/Creatinine Ratio 20  9-23   Sodium, Serum 140 mmol/L  134-144   Potassium, Serum 4 0 mmol/L  3 5-5 2   Chloride, Serum 106 mmol/L     Carbon Dioxide, Total 21 mmol/L  18-29   Calcium, Serum 8 1 mg/dL L 8 7-10 2   Protein, Total, Serum 6 5 g/dL  6 0-8 5   Albumin, Serum 3 8 g/dL  3 5-5 5   Globulin, Total 2 7 g/dL  1 5-4 5   A/G Ratio 1 4  1 2-2 2   Bilirubin, Total <0 2 mg/dL  0 0-1 2   Alkaline Phosphatase, S 80 IU/L     AST (SGOT) 26 IU/L  0-40   ALT (SGPT) 22 IU/L  0-32   eGFR If NonAfricn Am 87 mL/min/1 73  >59   eGFR If Africn Am 101 mL/min/1 73  >59     (1) CBC/PLT/DIFF [id number] 10:39AM Mal [employer]     Test Name Result Flag Reference   WBC 5 3 x10E3/uL  3 4-10 8   RBC 4 00 x10E6/uL  3 77-5 28   Hemoglobin 10 3 g/dL L 11 1-15 9   Hematocrit 32 2 % L 34 0-46  6   MCV 81 fL  79-97   MCH 25 8 pg L 26 6-33 0   MCHC 32 0 g/dL  31 5-35 7   RDW 16 0 % H 12 3-15 4   Platelets 171 L24P8/AW H 150-379   Neutrophils 63 %     Lymphs 27 %     Monocytes 7 %     Eos 2 %     Basos 1 %     Neutrophils (Absolute) 3 3 x10E3/uL  1 4-7 0   Lymphs (Absolute) 1 4 x10E3/uL  0 7-3 1   Monocytes(Absolute) 0 4 x10E3/uL  0 1-0 9   Eos (Absolute) 0 1 x10E3/uL  0 0-0 4   Baso (Absolute) 0 0 x10E3/uL  0 0-0 2   Immature Granulocytes 0 %     Immature Grans (Abs) 0 0 x10E3/uL  0 0-0 1     (1) FERRITIN 01Aug2017 10:39AM Mal Minder     Test Name Result Flag Reference   Ferritin, Serum 6 ng/mL L      (1) IRON 01Aug2017 10:39AM Mal Minder     Test Name Result Flag Reference   Iron, Serum 38 ug/dL         Signatures   Electronically signed by : Ellie Cabrera SHMUEL Humphrey;  Aug  3 2017  9:14AM EST                       (Author)

## 2018-01-12 NOTE — RESULT NOTES
Verified Results  (1) CBC/ PLT (NO DIFF) [id number] 08:40AM [facility]     Test Name Result Flag Reference   WBC 4 8 x10E3/uL  3 4-10 8   RBC 4 40 x10E6/uL  3 77-5 28   Hemoglobin 11 4 g/dL  11 1-15 9   Hematocrit 35 9 %  34 0-46  6   MCV 82 fL  79-97   MCH 25 9 pg L 26 6-33 0   MCHC 31 8 g/dL  31 5-35 7   RDW 18 5 % H 12 3-15 4   Platelets 794 P05L1/QF  150-379     (1) IRON [id number] 08:40AM [facility]     Test Name Result Flag Reference   Iron, Serum 38 ug/dL       (LC) Thyroid Panel [id number] 08:40AM [facility]     Test Name Result Flag Reference   Thyroxine (T4) 5 6 ug/dL  4 5-12 0   T3 Uptake 31 %  24-39   Free Thyroxine Index 1 7  1 2-4 9       Signatures   Electronically signed by : SHMUEL Pineda;  Aug 28 2017  8:05AM EST                       (Author)

## 2018-01-13 ENCOUNTER — APPOINTMENT (EMERGENCY)
Dept: RADIOLOGY | Facility: HOSPITAL | Age: 43
End: 2018-01-13
Payer: COMMERCIAL

## 2018-01-13 ENCOUNTER — HOSPITAL ENCOUNTER (EMERGENCY)
Facility: HOSPITAL | Age: 43
Discharge: HOME | End: 2018-01-13
Attending: EMERGENCY MEDICINE | Admitting: EMERGENCY MEDICINE
Payer: COMMERCIAL

## 2018-01-13 VITALS
SYSTOLIC BLOOD PRESSURE: 102 MMHG | BODY MASS INDEX: 28.82 KG/M2 | RESPIRATION RATE: 14 BRPM | HEIGHT: 65 IN | HEART RATE: 84 BPM | TEMPERATURE: 98.9 F | WEIGHT: 173 LBS | DIASTOLIC BLOOD PRESSURE: 70 MMHG

## 2018-01-13 VITALS
DIASTOLIC BLOOD PRESSURE: 60 MMHG | TEMPERATURE: 99.1 F | WEIGHT: 157 LBS | HEIGHT: 66 IN | BODY MASS INDEX: 25.23 KG/M2 | RESPIRATION RATE: 16 BRPM | HEART RATE: 84 BPM | SYSTOLIC BLOOD PRESSURE: 100 MMHG

## 2018-01-13 VITALS
DIASTOLIC BLOOD PRESSURE: 70 MMHG | SYSTOLIC BLOOD PRESSURE: 110 MMHG | WEIGHT: 171 LBS | HEIGHT: 65 IN | HEART RATE: 72 BPM | BODY MASS INDEX: 28.49 KG/M2 | RESPIRATION RATE: 14 BRPM | TEMPERATURE: 97.3 F

## 2018-01-13 VITALS
DIASTOLIC BLOOD PRESSURE: 68 MMHG | SYSTOLIC BLOOD PRESSURE: 112 MMHG | OXYGEN SATURATION: 99 % | WEIGHT: 155 LBS | HEIGHT: 66 IN | HEART RATE: 83 BPM | TEMPERATURE: 97.9 F | BODY MASS INDEX: 24.91 KG/M2 | RESPIRATION RATE: 16 BRPM

## 2018-01-13 VITALS
TEMPERATURE: 99 F | DIASTOLIC BLOOD PRESSURE: 60 MMHG | HEIGHT: 66 IN | HEART RATE: 68 BPM | RESPIRATION RATE: 12 BRPM | SYSTOLIC BLOOD PRESSURE: 98 MMHG | WEIGHT: 163 LBS | BODY MASS INDEX: 26.2 KG/M2

## 2018-01-13 VITALS
BODY MASS INDEX: 23.78 KG/M2 | TEMPERATURE: 98.3 F | RESPIRATION RATE: 14 BRPM | DIASTOLIC BLOOD PRESSURE: 70 MMHG | SYSTOLIC BLOOD PRESSURE: 102 MMHG | HEIGHT: 66 IN | WEIGHT: 148 LBS | HEART RATE: 92 BPM

## 2018-01-13 VITALS
BODY MASS INDEX: 29.49 KG/M2 | RESPIRATION RATE: 16 BRPM | TEMPERATURE: 98.7 F | SYSTOLIC BLOOD PRESSURE: 120 MMHG | HEART RATE: 88 BPM | DIASTOLIC BLOOD PRESSURE: 70 MMHG | HEIGHT: 65 IN | WEIGHT: 177 LBS

## 2018-01-13 VITALS
DIASTOLIC BLOOD PRESSURE: 70 MMHG | SYSTOLIC BLOOD PRESSURE: 110 MMHG | HEART RATE: 72 BPM | WEIGHT: 167 LBS | TEMPERATURE: 97.7 F | BODY MASS INDEX: 26.84 KG/M2 | HEIGHT: 66 IN | RESPIRATION RATE: 14 BRPM

## 2018-01-13 VITALS
DIASTOLIC BLOOD PRESSURE: 60 MMHG | BODY MASS INDEX: 26.2 KG/M2 | RESPIRATION RATE: 14 BRPM | HEART RATE: 78 BPM | WEIGHT: 163 LBS | HEIGHT: 66 IN | TEMPERATURE: 98.4 F | SYSTOLIC BLOOD PRESSURE: 92 MMHG

## 2018-01-13 VITALS
TEMPERATURE: 99.6 F | BODY MASS INDEX: 27.46 KG/M2 | SYSTOLIC BLOOD PRESSURE: 131 MMHG | OXYGEN SATURATION: 98 % | RESPIRATION RATE: 16 BRPM | WEIGHT: 165 LBS | HEART RATE: 84 BPM | DIASTOLIC BLOOD PRESSURE: 71 MMHG

## 2018-01-13 VITALS
TEMPERATURE: 98.8 F | HEART RATE: 78 BPM | WEIGHT: 163 LBS | HEIGHT: 66 IN | SYSTOLIC BLOOD PRESSURE: 110 MMHG | BODY MASS INDEX: 26.2 KG/M2 | DIASTOLIC BLOOD PRESSURE: 70 MMHG | RESPIRATION RATE: 14 BRPM

## 2018-01-13 VITALS
RESPIRATION RATE: 16 BRPM | OXYGEN SATURATION: 97 % | WEIGHT: 154 LBS | BODY MASS INDEX: 24.75 KG/M2 | TEMPERATURE: 97.9 F | HEIGHT: 66 IN | HEART RATE: 96 BPM | DIASTOLIC BLOOD PRESSURE: 58 MMHG | SYSTOLIC BLOOD PRESSURE: 82 MMHG

## 2018-01-13 VITALS
TEMPERATURE: 98.7 F | DIASTOLIC BLOOD PRESSURE: 70 MMHG | WEIGHT: 161 LBS | HEIGHT: 66 IN | BODY MASS INDEX: 25.88 KG/M2 | RESPIRATION RATE: 14 BRPM | HEART RATE: 78 BPM | SYSTOLIC BLOOD PRESSURE: 110 MMHG

## 2018-01-13 VITALS
BODY MASS INDEX: 26.03 KG/M2 | HEART RATE: 76 BPM | DIASTOLIC BLOOD PRESSURE: 76 MMHG | WEIGHT: 162 LBS | SYSTOLIC BLOOD PRESSURE: 121 MMHG | HEIGHT: 66 IN

## 2018-01-13 VITALS
BODY MASS INDEX: 25.71 KG/M2 | TEMPERATURE: 98.6 F | HEIGHT: 66 IN | DIASTOLIC BLOOD PRESSURE: 70 MMHG | SYSTOLIC BLOOD PRESSURE: 112 MMHG | WEIGHT: 160 LBS | RESPIRATION RATE: 12 BRPM | HEART RATE: 76 BPM

## 2018-01-13 DIAGNOSIS — S82.891A CLOSED FRACTURE OF RIGHT ANKLE, INITIAL ENCOUNTER: Primary | ICD-10-CM

## 2018-01-13 PROCEDURE — 73610 X-RAY EXAM OF ANKLE: CPT

## 2018-01-13 PROCEDURE — 99283 EMERGENCY DEPT VISIT LOW MDM: CPT

## 2018-01-13 RX ORDER — GABAPENTIN 600 MG/1
300 TABLET ORAL
COMMUNITY
End: 2019-04-18 | Stop reason: ALTCHOICE

## 2018-01-13 RX ORDER — IBUPROFEN 600 MG/1
600 TABLET ORAL ONCE
Status: DISCONTINUED | OUTPATIENT
Start: 2018-01-13 | End: 2018-01-13 | Stop reason: HOSPADM

## 2018-01-13 RX ORDER — ACETAMINOPHEN 325 MG/1
650 TABLET ORAL ONCE
Status: COMPLETED | OUTPATIENT
Start: 2018-01-13 | End: 2018-01-13

## 2018-01-13 RX ORDER — GABAPENTIN 300 MG/1
300 CAPSULE ORAL 2 TIMES DAILY
COMMUNITY
End: 2018-02-23 | Stop reason: SDUPTHER

## 2018-01-13 RX ADMIN — ACETAMINOPHEN 650 MG: 325 TABLET, FILM COATED ORAL at 20:24

## 2018-01-13 NOTE — MISCELLANEOUS
Message  Return to work or school:   Dayne Dumont is under my professional care  She was seen in my office on 8/1/17       Excused for illness 8/3/17  Lesli MI  Signatures   Electronically signed by : SHMUEL Corbin;  Aug  4 2017  1:13PM EST                       (Author)

## 2018-01-14 VITALS
WEIGHT: 164 LBS | BODY MASS INDEX: 26.36 KG/M2 | DIASTOLIC BLOOD PRESSURE: 62 MMHG | RESPIRATION RATE: 14 BRPM | TEMPERATURE: 98 F | HEART RATE: 72 BPM | SYSTOLIC BLOOD PRESSURE: 90 MMHG | HEIGHT: 66 IN

## 2018-01-14 VITALS
HEART RATE: 84 BPM | BODY MASS INDEX: 24.59 KG/M2 | DIASTOLIC BLOOD PRESSURE: 60 MMHG | TEMPERATURE: 98.2 F | WEIGHT: 153 LBS | RESPIRATION RATE: 16 BRPM | HEIGHT: 66 IN | SYSTOLIC BLOOD PRESSURE: 92 MMHG

## 2018-01-14 VITALS
HEART RATE: 78 BPM | DIASTOLIC BLOOD PRESSURE: 60 MMHG | HEIGHT: 65 IN | TEMPERATURE: 98.9 F | SYSTOLIC BLOOD PRESSURE: 92 MMHG | RESPIRATION RATE: 14 BRPM | BODY MASS INDEX: 27.99 KG/M2 | WEIGHT: 168 LBS

## 2018-01-14 NOTE — ED PROVIDER NOTES
History  Chief Complaint   Patient presents with    Ankle Pain     denies injuy, states the ankle started hurting yesterday     Patient presents for evaluation of right ankle pain for 2 days  Denies known injury  States she is on her feet all day at work and could have injured it  History provided by:  Patient   used: No    Ankle Pain       Prior to Admission Medications   Prescriptions Last Dose Informant Patient Reported? Taking?    Calcium Carb-Cholecalciferol (CALCIUM 600 + D PO) 1/13/2018 at Unknown time  Yes Yes   Sig: Take by mouth 2 (two) times a day Takes 3 tabs each dose=1800mg   Cholecalciferol (VITAMIN D PO) Past Week at Unknown time  Yes Yes   Sig: Take 50,000 Units by mouth once a week   Cyanocobalamin (VITAMIN B 12 PO) 1/12/2018 at Unknown time  Yes Yes   Sig: Take 1,000 mcg by mouth daily   Multiple Vitamin (MULTIVITAMIN) tablet 1/13/2018 at Unknown time  Yes Yes   Sig: Take 1 tablet by mouth daily   Norethin Ace-Eth Estrad-FE 1-20 MG-MCG(24) CAPS 1/13/2018 at Unknown time  Yes Yes   Sig: Take 5 mg by mouth 2 (two) times a day   Zolpidem Tartrate (AMBIEN PO) 1/12/2018 at Unknown time  Yes Yes   Sig: Take 10 mg by mouth daily at bedtime   albuterol (2 5 mg/3 mL) 0 083 % nebulizer solution Past Month at Unknown time  Yes Yes   Sig: Take 2 5 mg by nebulization every 6 (six) hours as needed for wheezing   amitriptyline (ELAVIL) 50 mg tablet 1/12/2018 at Unknown time  Yes Yes   Sig: Take 75 mg by mouth daily at bedtime     buPROPion (ZYBAN) 150 MG 12 hr tablet 1/13/2018 at Unknown time  Yes Yes   Sig: Take 300 mg by mouth daily     calcitriol (ROCALTROL) 0 5 MCG capsule 1/13/2018 at Unknown time  Yes Yes   Sig: Take 0 5 mcg by mouth 2 (two) times a day     cetirizine (ZyrTEC) 10 mg tablet 1/12/2018 at Unknown time  Yes Yes   Sig: Take 10 mg by mouth daily   clonazePAM (KlonoPIN) 1 mg tablet 1/12/2018 at Unknown time  Yes Yes   Sig: Take 2 mg by mouth daily at bedtime cyclobenzaprine (FLEXERIL) 5 mg tablet 1/12/2018 at Unknown time  Yes Yes   Sig: Take 5 mg by mouth daily   desvenlafaxine succinate (PRISTIQ) 50 mg 24 hr tablet 1/13/2018 at Unknown time  Yes Yes   Sig: Take 50 mg by mouth daily   folic acid (FOLVITE) 1 mg tablet 1/13/2018 at Unknown time  Yes Yes   Sig: Take 1 mg by mouth daily   gabapentin (NEURONTIN) 300 mg capsule 1/13/2018 at Unknown time  Yes Yes   Sig: Take 300 mg by mouth 2 (two) times a day   gabapentin (NEURONTIN) 600 MG tablet 1/12/2018 at Unknown time  Yes Yes   Sig: Take 300 mg by mouth daily at bedtime   levothyroxine 175 mcg tablet 1/12/2018 at Unknown time  Yes Yes   Sig: Take 175 mcg by mouth Five days a week, mon- fri    magnesium oxide (MAG-OX) 400 mg 1/13/2018 at Unknown time  Yes Yes   Sig: Take 400 mg by mouth 3 (three) times a day   montelukast (SINGULAIR) 10 mg tablet 1/12/2018 at Unknown time  Yes Yes   Sig: Take 10 mg by mouth daily at bedtime   omeprazole (PriLOSEC) 20 mg delayed release capsule 1/13/2018 at Unknown time  Yes Yes   Sig: Take 40 mg by mouth daily     ondansetron (ZOFRAN) 4 mg tablet Past Week at Unknown time  No Yes   Sig: Take 1 tablet by mouth every 8 (eight) hours as needed for nausea or vomiting for up to 6 doses   sucralfate (CARAFATE) 1 g tablet Past Week at Unknown time  Yes Yes   Sig: Take 1 g by mouth as needed     topiramate (TOPAMAX) 50 MG tablet 1/13/2018 at Unknown time  Yes Yes   Sig: Take 50 mg by mouth 2 (two) times a day      Facility-Administered Medications: None       Past Medical History:   Diagnosis Date    Asthma     Cancer ([facility] [address] )     thyroid    Disease of thyroid gland     Gastritis     Hypocalcemia     Hypoglycemia     Migraine     Psychiatric disorder     depression       Past Surgical History:   Procedure Laterality Date    ABDOMINAL SURGERY  2011    gastric bypass    CHOLECYSTECTOMY  1993    lap    COLONOSCOPY N/A 12/12/2016    Procedure: COLONOSCOPY;  Surgeon: Dwayne Todd MD; Location: [facility] [address] [facility]; Service:     ESOPHAGOGASTRODUODENOSCOPY N/A 12/12/2016    Procedure: ESOPHAGOGASTRODUODENOSCOPY (EGD); Surgeon: Radhika Zhang MD;  Location: [facility] GI LAB; Service:    Wander Hurd TUBAL LIGATION      GASTRIC BYPASS      HERNIA REPAIR      hiatel hernia repair    HYSTERECTOMY      INTUSSUSCEPTION REPAIR  2016    THYROID SURGERY      THYROIDECTOMY  2016       Family History   Problem Relation Age of Onset    Lupus Mother     Crohn's disease Mother     Diabetes Father      I have reviewed and agree with the history as documented  Social History   Substance Use Topics    Smoking status: Never Smoker    Smokeless tobacco: Never Used    Alcohol use Yes      Comment: occ        Review of Systems   All other systems reviewed and are negative  Physical Exam  ED Triage Vitals [01/13/18 1924]   Temperature Pulse Respirations Blood Pressure SpO2   99 6 °F (37 6 °C) 84 16 131/71 98 %      Temp Source Heart Rate Source Patient Position - Orthostatic VS BP Location FiO2 (%)   Tympanic Monitor Sitting Right arm --      Pain Score       8           Orthostatic Vital Signs  Vitals:    01/13/18 1924   BP: 131/71   Pulse: 84   Patient Position - Orthostatic VS: Sitting       Physical Exam   Constitutional: She is oriented to person, place, and time  No distress  Cardiovascular: Normal rate, regular rhythm and intact distal pulses  Pulmonary/Chest: Effort normal and breath sounds normal  No respiratory distress  Musculoskeletal: She exhibits tenderness  She exhibits no deformity  Feet:    Neurological: She is oriented to person, place, and time  Skin: Capillary refill takes less than 2 seconds  She is not diaphoretic  Nursing note and vitals reviewed        ED Medications  Medications   ibuprofen (MOTRIN) tablet 600 mg (600 mg Oral Not Given 1/13/18 2020)   acetaminophen (TYLENOL) tablet 650 mg (650 mg Oral Given 1/13/18 2024)       Diagnostic Studies  Results Reviewed None                 XR ankle 3+ views RIGHT    (Results Pending)              Procedures  Orthopedic Injury  Date/Time: 1/13/2018 8:41 PM  Performed by: Elvia Marin  Authorized by: Elvia Marin   Consent given by: patient  Injury location: ankle  Location details: right ankle  Injury type: fracture  Fracture type: medial malleolus  Pre-procedure neurovascular assessment: neurovascularly intact  Pre-procedure distal perfusion: normal  Pre-procedure neurological function: normal  Pre-procedure range of motion: normal  Immobilization: splint  Splint type: short leg  Supplies used: fiberglass  Post-procedure neurovascular assessment: post-procedure neurovascularly intact  Post-procedure distal perfusion: normal  Post-procedure neurological function: normal  Post-procedure range of motion: unchanged  Patient tolerance: Patient tolerated the procedure well with no immediate complications             Phone Contacts  ED Phone Contact    ED Course  ED Course                                MDM  Number of Diagnoses or Management Options  Closed fracture of right ankle, initial encounter:   Diagnosis management comments: Pulse ox 98% on RA indicating adequate oxygenation  Xray R ankle: probable avulsion fracture medial malleolus as read by me            Amount and/or Complexity of Data Reviewed  Tests in the radiology section of CPT®: ordered and reviewed  Independent visualization of images, tracings, or specimens: yes      CritCare Time    Disposition  Final diagnoses:   Closed fracture of right ankle, initial encounter     Time reflects when diagnosis was documented in both MDM as applicable and the Disposition within this note     Time User Action Codes Description Comment    1/13/2018  8:15 PM Ines Uribe Add [M86 227H] Closed fracture of right ankle, initial encounter       ED Disposition     ED Disposition Condition Comment    [facility] discharge to home/self care      Condition at discharge: stable        Follow-up Information     Follow up With Specialties Details [facility] [address] , DO Orthopedic Surgery In 3 days  [address]  [phone]          Patient's Medications   Discharge Prescriptions    No medications on file     No discharge procedures on file      ED Provider  Electronically Signed by           Vladimir Strong DO  01/14/18 1057

## 2018-01-14 NOTE — DISCHARGE INSTRUCTIONS
Ankle Fracture   WHAT YOU NEED TO KNOW:   An ankle fracture is a break in 1 or more of the bones in your ankle  DISCHARGE INSTRUCTIONS:   Call 911 for any of the following:   · You feel lightheaded, short of breath, and have chest pain  · You cough up blood  Seek care immediately if:   · Your leg feels warm, tender, and painful  It may look swollen and red  · Blood soaks through your bandage  · You have severe pain in your ankle  · Your cast feels too tight  · Your foot or toes are cold or numb  · Your foot or toenails turn blue or gray  Contact your healthcare provider if:   · Your splint feels too tight  · Your swelling has increased or returned  · You have a fever  · Your pain does not go away, even after treatment  · You have questions or concerns about your condition or care  Medicines: You may need any of the following:  · Acetaminophen  decreases pain and fever  It is available without a doctor's order  Ask how much to take and how often to take it  Follow directions  Acetaminophen can cause liver damage if not taken correctly  · NSAIDs , such as ibuprofen, help decrease swelling, pain, and fever  This medicine is available with or without a doctor's order  NSAIDs can cause stomach bleeding or kidney problems in certain people  If you take blood thinner medicine, always ask your healthcare provider if NSAIDs are safe for you  Always read the medicine label and follow directions  · Prescription pain medicine  may be given  Ask your healthcare provider how to take this medicine safely  · Take your medicine as directed  Contact your healthcare provider if you think your medicine is not helping or if you have side effects  Tell him or her if you are allergic to any medicine  Keep a list of the medicines, vitamins, and herbs you take  Include the amounts, and when and why you take them  Bring the list or the pill bottles to follow-up visits   Carry your medicine list with you in case of an emergency  Follow up with your healthcare provider in 1 to 2 days: Your fracture may need to be reduced (bones pushed back into place) or you may need surgery  Write down your questions so you remember to ask them during your visits  Support devices: You will be given a brace, cast, or splint to limit your movement and protect your ankle  You may need to use crutches to protect your ankle and decrease your pain as you move around  Do not remove your device and do not put weight on your injured ankle  Splint and cast care:  Cover the splint or cast before you bathe so it does not get wet  Tape 2 plastic trash bags to your skin above the cast  Try to keep your ankle out of the water as much as possible  Rest:  Rest your ankle so that it can heal  Return to normal activities as directed  Ice:  Apply ice on your ankle for 15 to 20 minutes every hour or as directed  Use an ice pack, or put crushed ice in a plastic bag  Cover it with a towel  Ice helps prevent tissue damage and decreases swelling and pain  Elevate:  Elevate your ankle above the level of your heart as often as you can  This will help decrease swelling and pain  Prop your ankle on pillows or blankets to keep it elevated comfortably  © 2017 [facility] Information is for End User's use only and may not be sold, redistributed or otherwise used for commercial purposes  All illustrations and images included in CareNotes® are the copyrighted property of A D A M , Inc  or [employer]  The above information is an  only  It is not intended as medical advice for individual conditions or treatments  Talk to your doctor, nurse or pharmacist before following any medical regimen to see if it is safe and effective for you

## 2018-01-14 NOTE — RESULT NOTES
Verified Results  (1) COMPREHENSIVE METABOLIC PANEL [id number] 31:88MA Broderick Grimesn     Test Name Result Flag Reference   Glucose, Serum 102 mg/dL H 65-99   BUN 19 mg/dL  6-24   Creatinine, Serum 0 95 mg/dL  0 57-1 00   BUN/Creatinine Ratio 20  9-23   Sodium, Serum 140 mmol/L  134-144   Potassium, Serum 3 4 mmol/L L 3 5-5 2   Chloride, Serum 103 mmol/L     Carbon Dioxide, Total 20 mmol/L  18-29   Calcium, Serum 8 1 mg/dL L 8 7-10 2   Protein, Total, Serum 6 6 g/dL  6 0-8 5   Albumin, Serum 4 1 g/dL  3 5-5 5   Globulin, Total 2 5 g/dL  1 5-4 5   A/G Ratio 1 6  1 2-2 2   Bilirubin, Total 0 2 mg/dL  0 0-1 2   Alkaline Phosphatase, S 62 IU/L     AST (SGOT) 21 IU/L  0-40   ALT (SGPT) 15 IU/L  0-32   eGFR If NonAfricn Am 74 mL/min/1 73  >59   eGFR If Africn Am 85 mL/min/1 73  >59     (1) CBC/ PLT (NO DIFF) [id number] 10:13AM [facility] Grimesn     Test Name Result Flag Reference   WBC 7 9 x10E3/uL  3 4-10 8   RBC 4 42 x10E6/uL  3 77-5 28   Hemoglobin 12 9 g/dL  11 1-15 9   Hematocrit 39 2 %  34 0-46  6   MCV 89 fL  79-97   MCH 29 2 pg  26 6-33 0   MCHC 32 9 g/dL  31 5-35 7   RDW 21 1 % H 12 3-15 4   Platelets 760 O61X5/XD  150-379     (1) FERRITIN [id number] 10:13AM [facility] Rodriguesphin     Test Name Result Flag Reference   Ferritin, Serum 124 ng/mL       (1) IRON [id number] 10:13AM [facility] Rodriguesphin     Test Name Result Flag Reference   Iron, Serum 105 ug/dL         Signatures   Electronically signed by : SHMUEL March; Oct  6 2017  9:18AM EST                       (Author)

## 2018-01-15 ENCOUNTER — ALLSCRIPTS OFFICE VISIT (OUTPATIENT)
Dept: OTHER | Facility: OTHER | Age: 43
End: 2018-01-15

## 2018-01-15 VITALS
TEMPERATURE: 97.4 F | RESPIRATION RATE: 16 BRPM | WEIGHT: 181.25 LBS | HEIGHT: 65 IN | DIASTOLIC BLOOD PRESSURE: 60 MMHG | HEART RATE: 88 BPM | OXYGEN SATURATION: 98 % | SYSTOLIC BLOOD PRESSURE: 110 MMHG | BODY MASS INDEX: 30.2 KG/M2

## 2018-01-15 VITALS
BODY MASS INDEX: 25.29 KG/M2 | RESPIRATION RATE: 16 BRPM | DIASTOLIC BLOOD PRESSURE: 68 MMHG | WEIGHT: 157.38 LBS | HEART RATE: 88 BPM | TEMPERATURE: 98.4 F | HEIGHT: 66 IN | SYSTOLIC BLOOD PRESSURE: 112 MMHG | OXYGEN SATURATION: 98 %

## 2018-01-15 VITALS
SYSTOLIC BLOOD PRESSURE: 110 MMHG | HEIGHT: 66 IN | WEIGHT: 160 LBS | TEMPERATURE: 100.2 F | RESPIRATION RATE: 16 BRPM | HEART RATE: 96 BPM | DIASTOLIC BLOOD PRESSURE: 70 MMHG | BODY MASS INDEX: 25.71 KG/M2

## 2018-01-15 NOTE — MISCELLANEOUS
Message  Return to work or school:   Mignon Keita is under my professional care  She was seen in my office on 9/25/17    She is not able to return to work until re-eval     Surg scheduled 10/16/17  Kayla MI        Signatures   Electronically signed by : SHMUEL Rollins; Sep 25 2017 12:11PM EST                       (Author)

## 2018-01-16 NOTE — MISCELLANEOUS
Message  To whom it may concern: The purpose of this letter is to attest that Jael Benson has been under my care for ongoing abdominal pain, myositis starting 1 October 5, 2016  I have been seeing her on a biweekly (approximate) basis since that date  Additionally, she is being co managed by gastroenterologist, Dr Antonieta Fuller (starting 10/18/16--last 1  visit 3/7/17), neurologist, Dr Tara Feng (starting 10/25/16--last 1  visit 2/28/17), general surgeon, Dr Marcel Severino (visit 1  1/12/17)  Furthermore, she has upcoming visit with rheumatologist, Dr Cherie Estevez, on 4/6/2017 for elevated Crp, positive LETY  Current medication list: See attached  1  Please feel free to contact me with any questions       Sincerely,         Isela MI       1 Amended By: Vee Calles; Mar 24 2017 3:22 PM EST    Signatures   Electronically signed by : SHMUEL Singleton; Mar 24 2017  3:24PM EST                       (Author)

## 2018-01-16 NOTE — PROGRESS NOTES
Assessment   1  Migraine, chronic, without aura (346 70) (G43 709)    Plan   Migraine, chronic, without aura    · Amitriptyline HCl - 75 MG Oral Tablet; TAKE 1 TABLET AT BEDTIME   Rx By: Julio Cook; Dispense: 30 Days ; #:30 Tablet; Refill: 5;For: Migraine, chronic, without aura; KAY = N; Verified Transmission to [address] DE[facility] #334; Last Updated By: System, SureScripts; 1/15/2018 4:15:13 PM   · Topiramate 50 MG Oral Tablet; TAKE 1 TABLET AT BEDTIME   Rx By: Julio Cook; Dispense: 0 Days ; #:30 Tablet; Refill: 5;For: Migraine, chronic, without aura; KAY = N; Verified Transmission to [address] DE[facility] #079; Last Updated By: System, SureScripts; 1/15/2018 4:15:13 PM  PMH: Frequent headaches    · Eletriptan Hydrobromide 40 MG Oral Tablet (Relpax); TAKE 1 TABLET AT ONSET    OF severe headache  MAY REPEAT IN  2 HOURS  MAX 2 DOSES/24 HOURS, NO    MORE THAN 3 DAYS PER WEEK  Rx By: Julio Cook; Dispense: 0 Days ; #:10 Tablet; Refill: 1;For: PMH: Frequent headaches; KAY = N; Verified Transmission to [address] DE[facility] #500; Last Updated By: System, SureScripts; 1/15/2018 4:17:20 PM    Discussion/Summary   Discussion Summary:    Patient is stable from migraine standpoint  resume her on prior elavil and topamax  renew relpax  Counseling Documentation With Imm: The patient was counseled regarding instructions for management,-- risk factor reductions,-- prognosis,-- patient and family education,-- impressions,-- risks and benefits of treatment options,-- importance of compliance with treatment  total time of encounter was 25 minutes-- and-- 15 minutes was spent counseling  Goals and Barriers: The patient has the current Goals: At her goal              Patient's Capacity to Self-Care: Patient is able to Self-Care               Medication SE Review and Pt Understands Tx: Possible side effects of new medications were reviewed with the patient/guardian today  The treatment plan was reviewed with the patient/guardian  The patient/guardian understands and agrees with the treatment plan             Headache [facility]:      The patient was counseled regarding;    Discussed side effects of all medications prescribed today to the patient in detail  Patient education was completed today and we also discussed precautions for rebound headaches  --       When patient has a moderate to severe headache, they should seek rest, initiate relaxation and apply cold compresses to the head  Also recommended to the patient :  1  Maintain regular sleep schedule -- 2  Limit over the counter medications  (No more than 3 times a week)  -- 3  Maintain headache diary  -- 4  Limit caffeine to 1-2 cups a day or less  -- 5  Avoid dietary trigger  (list given to the patient and reviewed with them)  -- 6  Patient is to have regular frequent meals to prevent headache onset  Self Referrals:    Self Referrals: No             Medication Side Effects Reviewed: Possible side effects of new medications were reviewed with the patient/guardian today  Chief Complaint   Chief Complaint Free Text Note Form: post concussion      History of Present Illness   HPI: Ms Anna Toledo is a 42 yo F with h/o migraines returns as follow up for post concussion headache  Per my previous history, On Sept 30th 2016, She was cleaning and accidentally hit her head on brick counter  She did not loose consciousness  She had nausea and visual disturbance, dizziness afterwards  These symptoms resolved after 1 week  She is now doing well in terms of her headaches  She's on elavil 75mg and Topamax 50mg nightly and this has been controlled her headaches to now 3-4/month from almost daily  She denies any side effects  past friday she twisted her ankle so is using crutches        Review of Systems   Neurological ROS:      Constitutional: no fever, no chills, no recent weight gain, no recent weight loss, no complaints of feeling tired, no changes in appetite  Neurological General: headache, but-- no nausea or vomiting,-- no lightheadedness,-- no convulsions,-- no syncope-- and-- no trauma  Active Problems   1  Abdominal pain, epigastric (789 06) (R10 13)   2  Abnormal menstrual cycle (626 9) (N92 6)   3  Acute lower UTI (599 0) (N39 0)   4  Acute otitis externa of left ear (380 10) (H60 502)   5  Acute otitis media (382 9) (H66 90)   6  Acute pharyngitis (462) (J02 9)   7  Acute right otitis media (382 9) (H66 91)   8  Acute URI (465 9) (J06 9)   9  Adult hypothyroidism (244 9) (E03 9)   10  Allergic rhinitis (477 9) (J30 9)   11  Analgesic rebound headache (339 3,E935 9) (T39 91XA,G44 40)   12  Asthma, allergic, mild intermittent, uncomplicated (008 03) (R87 44)   13  Ceruminosis, left (380 4) (H61 22)   14  Cervicalgia (723 1) (M54 2)   15  Change in bowel habits (787 99) (R19 4)   16  Chest pain, localized (786 59) (R07 89)   17  Chest wall tenderness (786 52) (R07 89)   18  Chronic diarrhea (787 91) (K52 9)   19  Chronic fatigue (780 79) (R53 82)   20  Chronic low back pain (724 2,338 29) (M54 5,G89 29)   21  Chronic post-operative pain (338 28) (G89 28)   22  Continuous severe abdominal pain (789 00) (R10 9)   23  CRP elevated (790 95) (R79 82)   24  Depression with anxiety (300 4) (F41 8)   25  Drug therapy (V58 69) (Z79 899)   26  Encounter for Papanicolaou cervical smear to confirm findings of recent normal smear      following initial abnormal smear (V72 32) (Z01 42)   27  Esophagitis (530 10) (K20 9)   28  Fibromyalgia (729 1) (M79 7)   29  GERD without esophagitis (530 81) (K21 9)   30  Hyperlipidemia (272 4) (E78 5)   31  Hypocalcemia (275 41) (E83 51)   32  Iron deficiency anemia (280 9) (D50 9)   33  Irritable bowel syndrome with diarrhea (564 1) (K58 0)   34  Left ear pain (388 70) (H92 02)   35  Migraine, chronic, without aura (346 70) (G43 709)   36  Myalgia (729 1) (M79 1)   37   Nausea (787 02) (R11 0)   38  Nausea and vomiting (787 01) (R11 2)   39  Pelvic pain in female (625 9) (R10 2)   40  Positive LETY (antinuclear antibody) (795 79) (R76 8)   41  Post-concussion headache (339 20) (G44 309)   42  Pre-op evaluation (V72 84) (Z01 818)   43  Reactive airway disease (493 90) (J45 909)   44  Status post surgery (V45 89) (Z98 890)   45  Tinnitus (388 30) (H93 19)   46  Uterine fibroid (218 9) (D25 9)    Past Medical History   1  History of Acute left otitis media (382 9) (H66 92)   2  History of Adenopathy, cervical (785 6) (R59 0)   3  History of Adhesion, postoperative   4  History of Concussion, without loss of consciousness, subsequent encounter   5  History of Fever of unknown origin (FUO) (780 60) (R50 9)   6  History of Flu vaccine need (V04 81) (Z23)   7  History of Frequent headaches (784 0) (R51)   8  History of Hip pain (719 45) (M25 559)   9  History of abdominal pain (V13 89) (Z87 898)   10  History of depression (V11 8) (Z86 59)   11  History of esophageal reflux (V12 79) (Z87 19)   12  History of gastritis (V12 79) (Z87 19)   13  History of malignant neoplasm of thyroid (V10 87) (Z85 850)   14  History of migraine with aura (V12 49) (Z86 69)   15  History of nausea (V12 79) (Z87 898)   16  History of Injury of left foot, initial encounter (959 7) (N85 235D)   17  History of Laceration of finger, index (883 0) (S61 218A)   18  History of Loss of appetite (783 0) (R63 0)   19  History of Loss of weight (783 21) (R63 4)   20  History of Myositis of multiple sites, unspecified myositis type (729 1) (M60 9)   21  History of Sinus headache (784 0) (R51)   22  History of Visit for suture removal (V58 32) (Z48 02)   23  History of Worsening headaches (784 0) (R51)  Active Problems And Past Medical History Reviewed: The active problems and past medical history were reviewed and updated today  Surgical History   1  History of Biopsy Endometrial, Without Cervical Dilation   2   History of Cholecystectomy   3  History of Gastric Surgery For Morbid Obesity Bypass With Torey-en-Y   4  History of Thyroid Surgery Total Thyroidectomy    Family History   Mother    1  Denied: Family history of Colon cancer   2  Denied: Family history of Crohn's disease without complication, unspecified     gastrointestinal tract location   3  Denied: Family history of liver disease   4  Family history of systemic lupus erythematosus (V19 4) (Z82 69)  Father    5  Denied: Family history of Colon cancer   6  Denied: Family history of Crohn's disease without complication, unspecified     gastrointestinal tract location   7  Family history of Diabetes mellitus due to underlying condition with both eyes affected by     mild nonproliferative retinopathy without macular edema, unspecified long term insulin     use status   8  Family history of liver disease (V18 59) (Z83 79)  Maternal Grandmother    9  Family history of rheumatoid arthritis (V17 7) (Z82 61)  Family History Reviewed: The family history was reviewed and updated today  Social History    · Drinks caffeinated tea   · Denied: History of Drug use   ·    · Never a smoker   · Occasional alcohol use  Social History Reviewed: The social history was reviewed and updated today  Social History Reviewed: The social history was reviewed and updated today  Current Meds    1  Acetaminophen 500 MG Oral Tablet; TAKE TABLET  TAKES TWO TABLETS AS NEEDED     AND ONE TABLET AT BEDTIME; Therapy: (Recorded:08Nov2017) to Recorded   2  Albuterol Sulfate (2 5 MG/3ML) 0 083% Inhalation Nebulization Solution; USE 1 UNIT     DOSE EVERY 4-6 HOURS AS NEEDED FOR WHEEZING ; Therapy: 17Vky8993 to (Last Rx:88Dua4007)  Requested for: 77Fmp7429 Ordered   3  Ambien 10 MG Oral Tablet; TAKE 1 TABLET Bedtime PRN; Therapy: 18JMJ7306 to Recorded   4  Amitriptyline HCl - 75 MG Oral Tablet; TAKE 1 TABLET AT BEDTIME;      Therapy: 09HAW3164 to (Evaluate:28Jan2018)  Requested for: 57KOO4384; Last     Rx:2017 Ordered   5  Aspirin EC 81 MG Oral Tablet Delayed Release; TAKE 1 TABLET DAILY AS DIRECTED; Therapy: 21RVZ6698 to (Evaluate:2018)  Requested for: 51Tpq5824; Last     Rx:23Phg8216 Ordered   6  BuPROPion HCl ER (XL) 300 MG Oral Tablet Extended Release 24 Hour; TAKE 1     TABLET DAILY; Therapy: 60UOV0710 to Recorded   7  Calcitriol 0 25 MCG Oral Capsule; TAKE CAPSULE 3 times daily; Therapy: 36SMO4219 to Recorded   8  Calcium 600 MG Oral Tablet; TAKE 3 TABLET Three times daily  Requested for:     99SEC3640; Last Rx:08Qpj2053 Ordered   9  Carafate 1 GM/10ML Oral Suspension; TAKE 10 ML 3 TIMES DAILY; Therapy: 64EKJ5058 to (Evaluate:2018)  Requested for: 06SHK6131; Last     Rx:2017 Ordered   10  Cetirizine HCl - 10 MG Oral Tablet; TAKE 1 TABLET DAILY AS NEEDED; Therapy: 30RPG6071 to ([facility])  Requested for: 55Xnj2351; Last      Rx:39Vap9887 Ordered   11  ClonazePAM 1 MG Oral Tablet; Take 1 tablet daily; Therapy: 21JAM2924 to Recorded   12  Flexeril 5 MG TABS; TAKE 1 TABLET AT BEDTIME; Therapy: (28 193 923) to Recorded   13  Fluticasone Propionate 50 MCG/ACT Nasal Suspension; Two sprays in each nostril      once daily; Therapy: 98YTE7434 to (Last Rx:72Rpe7424) Ordered   14  Folic Acid 1 MG Oral Tablet; TAKE 1 TABLET DAILY; Therapy: 02AQH6032 to (93 57 79)  Requested for: 17Vyf7878; Last      Rx:11Jjh9042 Ordered   15  Gabapentin 300 MG Oral Capsule; TAKE 1 CAPSULE  TWICE DAILY  AND 2 CAPSULES      IN THE EVENING; Therapy: 25UTN4773 to (Last F58XHJ2639)  Requested for: 95QFQ7160 Ordered   16  Levothyroxine Sodium 175 MCG Oral Tablet; Take 1 tablet daily five days per week; Therapy: 33STW3376 to Recorded   17  Magnesium 500 MG Oral Capsule; TAKE CAPSULE 3 times daily; Therapy: 74EDL4457 to Recorded   18  Montelukast Sodium 10 MG Oral Tablet; TAKE 1 TABLET DAILY AS DIRECTED;       Therapy: 31PFB3306 to ([facility])  Requested for: 77LOU2281; Last      UW:78NVF2034 Ordered   19  Multi For Her Oral Tablet; TAKE 1 TABLET DAILY; Therapy: 29TUI7915 to Recorded   20  Omeprazole 40 MG Oral Capsule Delayed Release; take 1 capsule by mouth once daily; Therapy: 52RZF6342 to ([facility])  Requested for: 04TIS4697; Last      Rx:86Hld1385 Ordered   21  Ondansetron HCl - 4 MG Oral Tablet; as needed; Therapy: 46NCU0742 to (Last Rx:09Jan2018)  Requested for: 51AXH9365 Ordered   22  Ondansetron HCl - 4 MG Oral Tablet; take 1 tablet 2 times daily; Therapy: 98YCM8592 to ([facility])  Requested for: 71QFL5491; Last      Rx:09Jan2018 Ordered   23  Pristiq 50 MG Oral Tablet Extended Release 24 Hour; TAKE TABLET Daily; Therapy: 86OZL7848 to Recorded   24  Relpax 40 MG Oral Tablet; TAKE 1 TABLET AT ONSET OF severe headache  MAY      REPEAT IN  2 HOURS  MAX 2 DOSES/24 HOURS, NO MORE THAN 3 DAYS PER WEEK ;      Therapy: 19GLY5209 to (Last Rx:31Bgm6193)  Requested for: 77JNG2853 Ordered   25  Topiramate 50 MG Oral Tablet; TAKE 1 TABLET DAILY; Therapy: (0472 51 11 42) to Recorded   26  Tramadol-Acetaminophen 37 5-325 MG Oral Tablet; 1 Tablet at bedtime as needed; Therapy: 59HFB2485 to (Last Rx:08Jan2018) Ordered   27  Ventolin  (90 Base) MCG/ACT Inhalation Aerosol Solution; INHALE TWO PUFFS      EVERY SIX HOURS AS NEEDED FOR COUGH/WHEEZE;      Therapy: 80VMW3740 to (Last Rx:09Nov2017)  Requested for: 65VKK6203 Ordered   28  Vitamin B-12 1000 MCG Oral Tablet; Take 1 tablet daily; Therapy: 96KRS2889 to (Last Rx:18Sep2017)  Requested for: 20Zyp2872 Ordered   29  Vitamin D 94141 UNIT CAPS; TAKE 1 CAPSULE WEEKLY; Therapy: (Recorded:82Usk4792) to Recorded    Allergies   1  Adhesive Tape TAPE   2  Compazine TABS   3   Demerol SOLN    Vitals   Signs   Recorded: 93EPT7357 03:54PM   Heart Rate: 94  Systolic: 624, LUE, Sitting  Diastolic: 78, LUE, Sitting  Height Unobtainable: Yes  Weight Unobtainable: Yes    Physical Exam        Constitutional      General appearance: No acute distress, well appearing and well nourished  Eyes      Ophthalmoscopic examination: Abnormal   Bilateral optic discs: normal       Musculoskeletal      Gait and station: Normal gait, stance and balance  Muscle strength: Normal strength throughout  Muscle tone: No atrophy, abnormal movements, flaccidity, cogwheeling or spasticity  Neurologic      Orientation to person, place, and time: Normal        Recent and remote memory: Demonstrates normal memory  Attention span and concentration: Normal thought process and attention span  Language: Names objects, able to repeat phrases and speaks spontaneously  Fund of knowledge: Normal vocabulary with appropriate knowledge of current events and past history  2nd cranial nerve: Normal        3rd, 4th, and 6th cranial nerves: Normal        5th cranial nerve: Normal        7th cranial nerve: Normal        8th cranial nerve: Normal        9th cranial nerve: Normal        11th cranial nerve: Normal        12th cranial nerve: Normal        Sensation: Normal        Reflexes: Normal        Coordination: Normal        Future Appointments      Date/Time Provider Specialty Site   01/16/2018 03:00 PM [place]ITCHIE [place] [facility] [place]     Signatures    Electronically signed by : STARLA Myles ; Navjot 15 2018  4:18PM EST                       (Author)

## 2018-01-16 NOTE — RESULT NOTES
Message   Spoke to patient about the CT scan findings and advised her to follow up with her bariatric surgeon  She will  the reports and make an appointment with her surgeon Dr Elizabeth Lewis in [place], [place]     Verified Results  * [address] [id number] 07:49AM Ean Levo     Test Name Result Flag Reference   CT ABDOMEN PELVIS W CONTRAST (Report)     CT ABDOMEN AND PELVIS WITH IV CONTRAST     INDICATION: Diarrhea and mid abdominal pain for 2 months  COMPARISON: None  TECHNIQUE: CT examination of the abdomen and pelvis was performed after the administration of intravenous contrast  This examination, like all CT scans performed in the [facility], was performed utilizing techniques to minimize    radiation dose exposure, including the use of iterative reconstruction and automated exposure control  Axial, sagittal, and coronal reformatted images were submitted for interpretation  100 cc of intravenous Omnipaque 350 was administered for this    examination  Enteric contrast was given  FINDINGS:     ABDOMEN     LOWER CHEST: Calcified granuloma in the right middle lobe  LIVER/BILIARY TREE: 1 3 cm hypodensity in the right lobe possibly a cyst or hemangioma  GALLBLADDER: Cholecystectomy  SPLEEN: Scattered granulomata  PANCREAS: Unremarkable  ADRENAL GLANDS: Unremarkable  KIDNEYS/URETERS: Unremarkable  No hydronephrosis  STOMACH AND BOWEL: Small hiatal hernia  Status post gastric bypass surgery  Nonobstructive small bowel intussusception on the left side of the abdomen  Seen on image 46 of series 2 and image 51 of series 601  This appears at the small bowel    anastomosis  The contrast material has reached the large bowel  APPENDIX: No findings to suggest appendicitis  ABDOMINOPELVIC CAVITY: No ascites or free intraperitoneal air  No lymphadenopathy  VESSELS: Unremarkable for patient's age       PELVIS     REPRODUCTIVE ORGANS: Prominent uterus  Essure devices are present  URINARY BLADDER: Unremarkable  ABDOMINAL WALL/INGUINAL REGIONS: Unremarkable  OSSEOUS STRUCTURES: No acute fracture or destructive osseous lesion  IMPRESSION:       1  Status post gastric bypass surgery  2  Nonobstructive small bowel intussusception on the left side of the abdomen  Discussed with Dr Colten Chaudhry at 1550 hours on 10/27/2016         Workstation performed: [id number]     Signed by:   Brianna Durbin MD   10/27/16

## 2018-01-17 NOTE — CONSULTS
Chief Complaint  Pre-op clearance per Dr Gerrie Dandy Total Abdominal hysterectomy 10-16-17 fax [phone]  ck/lpn      History of Present Illness  Pre-Op Visit (Brief): The procedure is a(n) BRITTANEY scheduled for 10/16/17 with Dr Gerrie Dandy  The indication for surgery is heavy irregular menses  Surgical Risk Assessment:   Prior Anesthesia: She had prior anesthesia, no prior adverse reaction to edidural anesthesia, no prior adverse reaction to spinal anesthesia and no prior adverse reaction to general anesthesia  Pertinent Past Medical History: thyroid disease, but no angina, no arrhythmia, no CAD, no CHF, no chronic liver disease, no acute hepatitis, no coagulation delay, no primary hypercoagulable state, no secondary hypercoagulable state, no pulmonary embolism, no DVT, does not use anticoagulants, no diabetes, does not use insulin, no neck osteoarthrosis, does not wear dentures, no seizure disorder, no CVA, no asthma, not SHEKHAR, no renal disease and no low serum albumin  Exercise Capacity: able to walk four blocks without symptoms and able to walk two flights of stairs without symptoms  Lifestyle Factors: denies alcohol use, denies tobacco use and denies illegal drug use  Symptoms: no symptoms  Other SHEKHAR risk factors include normal BMI, female gender, normal neck circumference and age under 48  Predicted risk of SHEKHAR: None  Pertinent Family History: no pertinent family history  Living Situation: home is secure and supportive and no post-op concerns with her living situation  Review of Systems    Constitutional: feeling tired, but no fever  Eyes: no eyesight problems  ENT: no complaints of earache, no loss of hearing, no nose bleeds, no nasal discharge, no sore throat, no hoarseness  Cardiovascular: as noted in HPI, the heart rate was not fast, no palpitations and no lower extremity edema  Respiratory: No complaints of shortness of breath, no wheezing, no cough, no SOB on exertion, no orthopnea, no PND  Gastrointestinal: as noted in HPI and no abdominal pain  Genitourinary: as noted in HPI  Musculoskeletal: arthralgias  Integumentary: no rashes  Neurological: No complaints of headache, no confusion, no convulsions, no numbness, no dizziness or fainting, no tingling, no limb weakness, no difficulty walking  Psychiatric: not suicidal    Endocrine: no muscle weakness and no hot flashes  no feelings of weakness      Active Problems    1  Abnormal menstrual cycle (626 9) (N92 6)   2  Acute lower UTI (599 0) (N39 0)   3  Acute otitis externa of left ear (380 10) (H60 502)   4  Acute otitis media (382 9) (H66 90)   5  Acute pharyngitis (462) (J02 9)   6  Adult hypothyroidism (244 9) (E03 9)   7  Allergic rhinitis (477 9) (J30 9)   8  Analgesic rebound headache (339 3,E935 9) (T39 91XA,G44 40)   9  Asthma, allergic, mild intermittent, uncomplicated (137 46) (Q71 50)   10  Ceruminosis, left (380 4) (H61 22)   11  Cervicalgia (723 1) (M54 2)   12  Change in bowel habits (787 99) (R19 4)   13  Chest pain, localized (786 59) (R07 89)   14  Chest wall tenderness (786 52) (R07 89)   15  Chronic diarrhea (787 91) (K52 9)   16  Chronic fatigue (780 79) (R53 82)   17  Chronic low back pain (724 2,338 29) (M54 5,G89 29)   18  Chronic post-operative pain (338 28) (G89 28)   19  Continuous severe abdominal pain (789 00) (R10 9)   20  CRP elevated (790 95) (R79 82)   21  Depression with anxiety (300 4) (F41 8)   22  Drug therapy (V58 69) (Z79 899)   23  Encounter for Papanicolaou cervical smear to confirm findings of recent normal smear    following initial abnormal smear (V72 32) (Z01 42)   24  Esophagitis (530 10) (K20 9)   25  Fibromyalgia (729 1) (M79 7)   26  GERD without esophagitis (530 81) (K21 9)   27  Hyperlipidemia (272 4) (E78 5)   28  Hypocalcemia (275 41) (E83 51)   29  Iron deficiency anemia (280 9) (D50 9)   30  Irritable bowel syndrome with diarrhea (564 1) (K58 0)   31   Left ear pain (388 70) (H92 02) 32  Migraine, chronic, without aura (346 70) (G43 709)   33  Nausea and vomiting (787 01) (R11 2)   34  Pelvic pain in female (625 9) (R10 2)   35  Positive LETY (antinuclear antibody) (795 79) (R76 8)   36  Post-concussion headache (339 20) (G44 309)   37  Reactive airway disease (493 90) (J45 909)   38  Status post surgery (V45 89) (Z98 890)   39  Tinnitus (388 30) (H93 19)   40  Uterine fibroid (218 9) (D25 9)    Past Medical History    · History of Acute left otitis media (382 9) (H66 92)   · History of Adenopathy, cervical (785 6) (R59 0)   · History of Adhesion, postoperative   · History of Concussion, without loss of consciousness, subsequent encounter   · History of Fever of unknown origin (FUO) (780 60) (R50 9)   · History of Flu vaccine need (V04 81) (Z23)   · History of Frequent headaches (784 0) (R51)   · History of Hip pain (719 45) (M25 559)   · History of abdominal pain (V13 89) (Y68 919)   · History of depression (V11 8) (Z86 59)   · History of esophageal reflux (V12 79) (Z87 19)   · History of gastritis (V12 79) (Z87 19)   · History of malignant neoplasm of thyroid (V10 87) (Z85 850)   · History of migraine with aura (V12 49) (Z86 69)   · History of nausea (V12 79) (T99 594)   · History of Injury of left foot, initial encounter (959 7) (X35 347E)   · History of Laceration of finger, index (883 0) (S61 218A)   · History of Loss of appetite (783 0) (R63 0)   · History of Loss of weight (783 21) (R63 4)   · History of Myositis of multiple sites, unspecified myositis type (729 1) (M60 9)   · History of Sinus headache (784 0) (R51)   · History of Visit for suture removal (V58 32) (Z48 02)   · History of Worsening headaches (784 0) (R51)    The active problems and past medical history were reviewed and updated today        Surgical History    · History of Biopsy Endometrial, Without Cervical Dilation   · History of Cholecystectomy   · History of Gastric Surgery For Morbid Obesity Bypass With Torey-en-Y   · History of Thyroid Surgery Total Thyroidectomy    The surgical history was reviewed and updated today  Family History    · Denied: Family history of Colon cancer   · Denied: Family history of Crohn's disease without complication, unspecified  gastrointestinal tract location   · Denied: Family history of liver disease   · Family history of systemic lupus erythematosus (V19 4) (Z82 69)    · Denied: Family history of Colon cancer   · Denied: Family history of Crohn's disease without complication, unspecified  gastrointestinal tract location   · Family history of Diabetes mellitus due to underlying condition with both eyes affected by  mild nonproliferative retinopathy without macular edema, unspecified long term insulin  use status   · Family history of liver disease (V18 59) (Z83 79)    · Family history of rheumatoid arthritis (V17 7) (Z82 61)    The family history was reviewed and updated today  Social History    · Drinks caffeinated tea   · Denied: History of Drug use   ·    · Never a smoker   · Occasional alcohol use  The social history was reviewed and updated today  Current Meds   1  Albuterol Sulfate (2 5 MG/3ML) 0 083% Inhalation Nebulization Solution; USE 1 UNIT   DOSE EVERY 4-6 HOURS AS NEEDED FOR WHEEZING ; Therapy: 32Fnl4768 to (Last Rx:49Uzi0462)  Requested for: 67Zem7249 Ordered   2  Ambien 10 MG Oral Tablet; TAKE 1 TABLET Bedtime PRN; Therapy: 08CDQ7496 to Recorded   3  Amitriptyline HCl - 75 MG Oral Tablet; TAKE 1 TABLET AT BEDTIME; Therapy: 58UMU5992 to (Evaluate:89Don4239)  Requested for: 81MNG0117; Last   Rx:55Ncq9124 Ordered   4  Aspirin EC 81 MG Oral Tablet Delayed Release; TAKE 1 TABLET DAILY AS DIRECTED; Therapy: 37QAC5250 to (Evaluate:71Pky7295)  Requested for: 65Zjk0270; Last   Rx:15Jge5123 Ordered   5  BuPROPion HCl ER (XL) 300 MG Oral Tablet Extended Release 24 Hour; TAKE 1   TABLET DAILY; Therapy: 60RCO8986 to Recorded   6   Butalbital-APAP-Caff-Cod -13-30 MG Oral Capsule; take one as needed every 12   hours; Therapy: 55UUL1766 to (Last Rx:97Oxl2117) Ordered   7  Calcitriol 0 25 MCG Oral Capsule; TAKE CAPSULE 3 times daily; Therapy: 27GXH2636 to Recorded   8  Calcium 600 MG Oral Tablet; TAKE 3 TABLET Twice daily  Requested for: 10Aug2017;   Last Rx:10Aug2017 Ordered   9  Carafate 1 GM/10ML Oral Suspension; TAKE 10 ML 3 TIMES DAILY; Therapy: (Lilibeth José) to Recorded   10  Cefdinir 300 MG Oral Capsule; TAKE 1 CAPSULE TWICE DAILY; Therapy: 77AAO6345 to (Evaluate:50Hlj5479)  Requested for: 63FMQ1799; Last    Rx:37Esw2939 Ordered   11  Cetirizine HCl - 10 MG Oral Tablet; TAKE 1 TABLET DAILY AS NEEDED; Therapy: 70KUP9570 to (Timothy Poon)  Requested for: 35Rnx1553; Last    Rx:62Umr3220 Ordered   12  ClonazePAM 2 MG Oral Tablet; Take 1 tablet daily; Therapy: 04NFR8835 to Recorded   13  Co Q 10 100 MG Oral Capsule; Take once daily; Therapy: 05VAA7416 to (Last Rx:54Byk9057)  Requested for: 36GHJ5638 Ordered   14  Creon 24463-71048 UNIT Oral Capsule Delayed Release Particles; TAKE 1 CAPSULE    Before meals; Therapy: 30YKC8399 to (Evaluate:09Gbs2857)  Requested for: 42UGA2216; Last    Rx:29Mar2017 Ordered   15  Folic Acid 1 MG Oral Tablet; TAKE 1 TABLET DAILY; Therapy: 94SMN2872 to ([place])  Requested for: 84Aqq0765; Last    Rx:20Dbl3684 Ordered   16  Gabapentin 300 MG Oral Capsule; TAKE 1 CAPSULE  TWICE DAILY  AND 2 CAPSULES    IN THE EVENING; Therapy: 92SFL8696 to (Last RJ:88KAD0229)  Requested for: 55ZDC6963 Ordered   17  Levothyroxine Sodium 175 MCG Oral Tablet; Take 1 tablet daily five days per week; Therapy: 50HSV9133 to Recorded   18  Magnesium 500 MG Oral Capsule; TAKE CAPSULE 3 times daily; Therapy: 51MTP1557 to Recorded   19  Montelukast Sodium 10 MG Oral Tablet; TAKE 1 TABLET DAILY AS DIRECTED;     Therapy: 07AAH2427 to (Evaluate:64Aoi1757)  Requested for: 05USA9912; Last    Rx:30Jan2017 Ordered 20  Multi For Her Oral Tablet; TAKE 1 TABLET DAILY; Therapy: 04JUT5228 to Recorded   21  Omeprazole 40 MG Oral Capsule Delayed Release; take 1 capsule by mouth once daily; Therapy: 87JLP8153 to (Evaluate:40Nqy8714)  Requested for: 58XDY4831; Last    Rx:78Pgr6012 Ordered   22  Ondansetron 4 MG Oral Tablet Disintegrating; TAKE 4 MG Every 8 hours PRN persisting    nausea/vomitin As Directed; Therapy: 13Xgs2871 to (Last Rx:94Bhy4263)  Requested for: 87Lkx3190 Ordered   23  PredniSONE 2 5 MG Oral Tablet; TAKE 1 TABLET DAILY AS DIRECTED; Therapy: 28DMX8273 to (Evaluate:16Jan2018)  Requested for: 43Hek1150; Last    Rx:98Loq6900 Ordered   24  Pristiq 50 MG Oral Tablet Extended Release 24 Hour; TAKE TABLET Daily; Therapy: 11XKM7381 to Recorded   25  Relpax 40 MG Oral Tablet; TAKE 1 TABLET AT ONSET OF severe headache  MAY    REPEAT IN  2 HOURS  MAX 2 DOSES/24 HOURS, NO MORE THAN 3 DAYS PER WEEK ;    Therapy: 46RVZ1045 to (Last Rx:32Ksi4355)  Requested for: 76ESL2581 Ordered   26  Topiramate 50 MG Oral Tablet; TAKE 1 TABLET TWICE DAILY; Therapy: 56TYP2433 to 070404-330)  Requested for: 28CKT9787; Last    Rx:64Btw3412 Ordered   27  Tramadol-Acetaminophen 37 5-325 MG Oral Tablet; 1 Tablet at bedtime as needed; Therapy: 57UNK2222 to (Last Rx:43Nwl2598) Ordered   28  Ventolin  (90 Base) MCG/ACT Inhalation Aerosol Solution; INHALE TWO PUFFS    EVERY SIX HOURS AS NEEDED FOR COUGH/WHEEZE;    Therapy: 70ATD7363 to (Last Rx:54Ceo2981) Ordered   29  Vitamin B-12 1000 MCG Oral Tablet; Take 1 tablet daily; Therapy: 56BPB5664 to (Last Rx:38Nuq4583)  Requested for: 96Dlk3363 Ordered   30  Vitamin D 93213 UNIT CAPS; TAKE 1 CAPSULE WEEKLY; Therapy: (Recorded:64Xan0361) to Recorded    The medication list was reviewed and updated today  Allergies    1  Adhesive Tape TAPE   2  Compazine TABS   3   Demerol SOLN    Vitals  Signs    Temperature: 99 F, Tympanic  Heart Rate: 84, L Radial  Pulse Quality: Normal, L Radial  Respiration Quality: Normal  Respiration: 14  Systolic: 783, LUE, Sitting  Diastolic: 70, LUE, Sitting  Height: 5 ft 5 in  Weight: 179 lb   BMI Calculated: 29 79  BSA Calculated: 1 89    Physical Exam    Constitutional   General appearance: No acute distress, well appearing and well nourished  Eyes   Conjunctiva and lids: No swelling, erythema or discharge  Pupils and irises: Equal, round and reactive to light  Ears, Nose, Mouth, and Throat   Oropharynx: Normal with no erythema, edema, exudate or lesions  Pulmonary   Respiratory effort: No increased work of breathing or signs of respiratory distress  Auscultation of lungs: Clear to auscultation  Cardiovascular   Auscultation of heart: Normal rate and rhythm, normal S1 and S2, without murmurs  Examination of extremities for edema and/or varicosities: Normal     Abdomen   Abdomen: Non-tender, no masses  Liver and spleen: No hepatomegaly or splenomegaly  Lymphatic   Palpation of lymph nodes in neck: No lymphadenopathy  Musculoskeletal   Gait and station: Normal     Skin   Skin and subcutaneous tissue: Normal without rashes or lesions  Psychiatric   Orientation to person, place, and time: Normal     Mood and affect: Normal     Additional Exam:  s/p thyroidectomy  Results/Data  A 12 lead ECG was performed and was normal    Rhythm and rate: normal sinus rhythm     P-waves: the P wave is normal    QRS: the QRS is normal    ST segment: the ST segments are normal  (1) COMPREHENSIVE METABOLIC PANEL 98OYI0549 52:28OW [place]     Test Name Result Flag Reference   Glucose, Serum 102 mg/dL H 65-99   BUN 19 mg/dL  6-24   Creatinine, Serum 0 95 mg/dL  0 57-1 00   BUN/Creatinine Ratio 20  9-23   Sodium, Serum 140 mmol/L  134-144   Potassium, Serum 3 4 mmol/L L 3 5-5 2   Chloride, Serum 103 mmol/L     Carbon Dioxide, Total 20 mmol/L  18-29   Calcium, Serum 8 1 mg/dL L 8 7-10 2   Protein, Total, Serum 6 6 g/dL 6 0-8 5   Albumin, Serum 4 1 g/dL  3 5-5 5   Globulin, Total 2 5 g/dL  1 5-4 5   A/G Ratio 1 6  1 2-2 2   Bilirubin, Total 0 2 mg/dL  0 0-1 2   Alkaline Phosphatase, S 62 IU/L     AST (SGOT) 21 IU/L  0-40   ALT (SGPT) 15 IU/L  0-32   eGFR If NonAfricn Am 74 mL/min/1 73  >59   eGFR If Africn Am 85 mL/min/1 73  >59     (1) CBC/ PLT (NO DIFF) 34CZF4757 10:13AM [place]     Test Name Result Flag Reference   WBC 7 9 x10E3/uL  3 4-10 8   RBC 4 42 x10E6/uL  3 77-5 28   Hemoglobin 12 9 g/dL  11 1-15 9   Hematocrit 39 2 %  34 0-46  6   MCV 89 fL  79-97   MCH 29 2 pg  26 6-33 0   MCHC 32 9 g/dL  31 5-35 7   RDW 21 1 % H 12 3-15 4   Platelets 610 O04P6/TV  150-379     (1) FERRITIN 05Oct2017 10:13AM [place]     Test Name Result Flag Reference   Ferritin, Serum 124 ng/mL       (1) IRON 05Oct2017 10:13AM Lisa Juan F     Test Name Result Flag Reference   Iron, Serum 105 ug/dL       Assessment    1  Pre-op evaluation (V72 84) (Z01 818)   2  Abnormal menstrual cycle (626 9) (N92 6)   3  Iron deficiency anemia (280 9) (D50 9)   · improved after iron transfusion    Plan  Health Maintenance    · Calcium 600 MG Oral Tablet; TAKE 3 TABLET Three times daily  Pre-op evaluation    · EKG/ECG- POC; Status:Active - Perform Order; Requested for:11Oct2017;     Discussion/Summary  Surgical Clearance: She is at a LOW risk from a cardiovascular standpoint at this time without any additional cardiac testing  Reevaluation needed, if she should present with symptoms prior to surgery/procedure  No overt medical contraindication for necessary BRITTANEY per ROS, labs, exam, EKG  End of Encounter Meds    1  Cefdinir 300 MG Oral Capsule; TAKE 1 CAPSULE TWICE DAILY; Therapy: 61OPH0130 to (Evaluate:15Oct2017)  Requested for: 82FGW8706; Last   Rx:10Oct2017 Ordered    2  Amitriptyline HCl - 75 MG Oral Tablet; TAKE 1 TABLET AT BEDTIME;    Therapy: 61MXS9279 to (Evaluate:48Lie0380)  Requested for: 95AKQ3617; Last Rx:34Gia9219 Ordered   3  Co Q 10 100 MG Oral Capsule; Take once daily; Therapy: 27SEB8776 to (Last Rx:32Whs4172)  Requested for: 05KGD4077 Ordered    4  Cetirizine HCl - 10 MG Oral Tablet; TAKE 1 TABLET DAILY AS NEEDED; Therapy: 38CVK7065 to ([phone])  Requested for: 94Etj0543; Last   Rx:64Iqc6682 Ordered   5  Montelukast Sodium 10 MG Oral Tablet; TAKE 1 TABLET DAILY AS DIRECTED; Therapy: 80BCN3537 to (Evaluate:24Yie0761)  Requested for: 35WJD7989; Last   Rx:30Jan2017 Ordered   6  Ventolin  (90 Base) MCG/ACT Inhalation Aerosol Solution; INHALE TWO PUFFS   EVERY SIX HOURS AS NEEDED FOR COUGH/WHEEZE;   Therapy: 75ATS8106 to (Last Rx:94Vja7422) Ordered    7  Creon 77559-54766 UNIT Oral Capsule Delayed Release Particles; TAKE 1 CAPSULE   Before meals; Therapy: 65LLB8946 to (Evaluate:42Nyo3764)  Requested for: 27NQN4016; Last   Rx:29Mar2017 Ordered    8  Tramadol-Acetaminophen 37 5-325 MG Oral Tablet; 1 Tablet at bedtime as needed; Therapy: 05XJX1310 to (Last Rx:12Ics5229) Ordered    9  Omeprazole 40 MG Oral Capsule Delayed Release; take 1 capsule by mouth once daily; Therapy: 78XGA5287 to (Evaluate:93Blf9273)  Requested for: 96HFE7242; Last   Rx:62Jyj7556 Ordered    10  Gabapentin 300 MG Oral Capsule; TAKE 1 CAPSULE  TWICE DAILY  AND 2 CAPSULES    IN THE EVENING; Therapy: 71MFC3079 to (Last XN:37SFO5927)  Requested for: 64URH0810 Ordered   11  PredniSONE 2 5 MG Oral Tablet; TAKE 1 TABLET DAILY AS DIRECTED; Therapy: 18MVL8893 to (Evaluate:17Uwr0801)  Requested for: 93Pao0792; Last    Rx:19Jiq8006 Ordered    12  Aspirin EC 81 MG Oral Tablet Delayed Release; TAKE 1 TABLET DAILY AS DIRECTED; Therapy: 21PYW7562 to (Evaluate:32Xcf2238)  Requested for: 09Txg4885; Last    Rx:97Cbn7305 Ordered    13  Topiramate 50 MG Oral Tablet; TAKE 1 TABLET TWICE DAILY; Therapy: 25FPM9943 to [phone])  Requested for: 60KNM7485; Last    Rx:54Pmd3035 Ordered    14   Ondansetron 4 MG Oral Tablet Disintegrating; TAKE 4 MG Every 8 hours PRN persisting    nausea/vomitin As Directed; Therapy: 02Ajr9950 to (Last Rx:30Rtf0810)  Requested for: 64Jlv9831 Ordered    15  Relpax 40 MG Oral Tablet (Eletriptan Hydrobromide); TAKE 1 TABLET AT ONSET OF    severe headache  MAY REPEAT IN  2 HOURS  MAX 2 DOSES/24 HOURS, NO MORE    THAN 3 DAYS PER WEEK ;    Therapy: 77FFO9192 to (Last Rx:32Ygt0231)  Requested for: 60KEL5469 Ordered    16  Folic Acid 1 MG Oral Tablet; TAKE 1 TABLET DAILY; Therapy: 49WYR8709 to (Sosa Viramontes)  Requested for: 15Iuq3375; Last    Rx:72Kip8978 Ordered   17  Vitamin B-12 1000 MCG Oral Tablet; Take 1 tablet daily; Therapy: 64BTK2992 to (Last Rx:71Izh2380)  Requested for: 20Hkz1173 Ordered    18  Butalbital-APAP-Caff-Cod -23-30 MG Oral Capsule; take one as needed every 12    hours; Therapy: 11AAC6111 to (Last Rx:48Ofw9665) Ordered    19  Albuterol Sulfate (2 5 MG/3ML) 0 083% Inhalation Nebulization Solution; USE 1 UNIT    DOSE EVERY 4-6 HOURS AS NEEDED FOR WHEEZING ; Therapy: 99Qgj2487 to (Last Rx:69Idw5345)  Requested for: 45Bfv0556 Ordered    20  Ambien 10 MG Oral Tablet (Zolpidem Tartrate); TAKE 1 TABLET Bedtime PRN; Therapy: 96RHA4642 to Recorded   21  BuPROPion HCl ER (XL) 300 MG Oral Tablet Extended Release 24 Hour; TAKE 1    TABLET DAILY; Therapy: 14GIR5811 to Recorded   22  Calcitriol 0 25 MCG Oral Capsule; TAKE CAPSULE 3 times daily; Therapy: 18HNL0604 to Recorded   23  Carafate 1 GM/10ML Oral Suspension; TAKE 10 ML 3 TIMES DAILY; Therapy: (21 ) to Recorded   24  ClonazePAM 2 MG Oral Tablet; Take 1 tablet daily; Therapy: 82FCK7598 to Recorded   25  Levothyroxine Sodium 175 MCG Oral Tablet; Take 1 tablet daily five days per week; Therapy: 67BBW6623 to Recorded   26  Magnesium 500 MG Oral Capsule; TAKE CAPSULE 3 times daily; Therapy: 40QFS9462 to Recorded   27  Multi For Her Oral Tablet; TAKE 1 TABLET DAILY;     Therapy: 45WIP0272 to Recorded   28  Pristiq 50 MG Oral Tablet Extended Release 24 Hour (Desvenlafaxine Succinate ER);    TAKE TABLET Daily; Therapy: 54JTZ9440 to Recorded   29  Vitamin D 49943 UNIT CAPS; TAKE 1 CAPSULE WEEKLY;     Therapy: (Recorded:50Dxi7515) to Recorded    Signatures   Electronically signed by : SHMUEL Beltran; Oct 11 2017 10:36AM EST                       (Author)    Electronically signed by : STARLA Payne ; Oct 11 2017 11:02AM EST                       (Author)

## 2018-01-17 NOTE — RESULT NOTES
Verified Results  (1) CBC/ PLT (NO DIFF) 21Sep2017 11:29AM Erven Megha     Test Name Result Flag Reference   HEMATOCRIT 38 7 %  34 8-46 1   HEMOGLOBIN 12 3 g/dL  11 5-15 4   MCHC 31 8 g/dL  31 4-37 4   MCH 28 1 pg  26 8-34 3   MCV 88 fL  82-98   PLATELET COUNT 060 Thousands/uL  149-390   RBC COUNT 4 38 Million/uL  3 81-5 12   RDW 23 0 % H 11 6-15 1   WBC COUNT 7 83 Thousand/uL  4 31-10 16   MPV 10 5 fL  8 9-12 7     (1) FERRITIN 75Swl0148 11:29AM Erven Megha     Test Name Result Flag Reference   FERRITIN 132 ng/mL  8-388     (1) IRON 04AML4131 11:29AM Erven Megha     Test Name Result Flag Reference   IRON 458 ug/dL H    Patients treated with metal-binding drugs (ie  Deferoxamine) may have depressed iron values       (1) TIBC 90Nos8243 11:29AM Erven Megha     Test Name Result Flag Reference   TOTAL IRON BINDING CAPACITY 392 ug/dL  250-450       Signatures   Electronically signed by : SHMUEL Corbin; Sep 22 2017  7:57AM EST                       (Author)

## 2018-01-19 ENCOUNTER — ALLSCRIPTS OFFICE VISIT (OUTPATIENT)
Dept: OTHER | Facility: OTHER | Age: 43
End: 2018-01-19

## 2018-01-22 VITALS — DIASTOLIC BLOOD PRESSURE: 78 MMHG | SYSTOLIC BLOOD PRESSURE: 122 MMHG | HEART RATE: 94 BPM

## 2018-01-22 VITALS
DIASTOLIC BLOOD PRESSURE: 70 MMHG | WEIGHT: 179 LBS | TEMPERATURE: 99 F | RESPIRATION RATE: 14 BRPM | HEIGHT: 65 IN | BODY MASS INDEX: 29.82 KG/M2 | SYSTOLIC BLOOD PRESSURE: 102 MMHG | HEART RATE: 84 BPM

## 2018-01-22 VITALS
TEMPERATURE: 98.6 F | DIASTOLIC BLOOD PRESSURE: 70 MMHG | HEIGHT: 65 IN | BODY MASS INDEX: 30.16 KG/M2 | HEART RATE: 80 BPM | RESPIRATION RATE: 12 BRPM | SYSTOLIC BLOOD PRESSURE: 116 MMHG | WEIGHT: 181 LBS

## 2018-01-22 VITALS
TEMPERATURE: 98.7 F | RESPIRATION RATE: 16 BRPM | WEIGHT: 184 LBS | SYSTOLIC BLOOD PRESSURE: 100 MMHG | DIASTOLIC BLOOD PRESSURE: 60 MMHG | HEIGHT: 65 IN | BODY MASS INDEX: 30.66 KG/M2 | HEART RATE: 84 BPM

## 2018-01-22 DIAGNOSIS — M76.821 POSTERIOR TIBIAL TENDINITIS OF RIGHT LEG: ICD-10-CM

## 2018-01-22 DIAGNOSIS — M21.42 ACQUIRED LEFT FLAT FOOT: ICD-10-CM

## 2018-01-22 DIAGNOSIS — M21.41 ACQUIRED RIGHT FLAT FOOT: ICD-10-CM

## 2018-01-23 NOTE — MISCELLANEOUS
Message  Return to work or school:   Jared Quispe is under my professional care  She was seen in my office on JANUARY 19, 2018 and is cleared for work  Earnest DO Georgie        Signatures   Electronically signed by : RITCHIE Knight ; Jan 19 2018  3:54PM EST                       (Author)

## 2018-01-24 VITALS
HEIGHT: 65 IN | WEIGHT: 180 LBS | BODY MASS INDEX: 29.99 KG/M2 | HEART RATE: 88 BPM | DIASTOLIC BLOOD PRESSURE: 70 MMHG | SYSTOLIC BLOOD PRESSURE: 120 MMHG

## 2018-01-24 VITALS
HEIGHT: 65 IN | RESPIRATION RATE: 12 BRPM | BODY MASS INDEX: 29.99 KG/M2 | WEIGHT: 180 LBS | DIASTOLIC BLOOD PRESSURE: 70 MMHG | SYSTOLIC BLOOD PRESSURE: 110 MMHG | TEMPERATURE: 97.9 F | HEART RATE: 76 BPM

## 2018-01-24 VITALS
DIASTOLIC BLOOD PRESSURE: 68 MMHG | OXYGEN SATURATION: 97 % | HEART RATE: 82 BPM | SYSTOLIC BLOOD PRESSURE: 102 MMHG | TEMPERATURE: 98.6 F | RESPIRATION RATE: 16 BRPM

## 2018-02-14 ENCOUNTER — OFFICE VISIT (OUTPATIENT)
Dept: FAMILY MEDICINE CLINIC | Facility: CLINIC | Age: 43
End: 2018-02-14
Payer: COMMERCIAL

## 2018-02-14 VITALS
DIASTOLIC BLOOD PRESSURE: 70 MMHG | RESPIRATION RATE: 16 BRPM | BODY MASS INDEX: 29.92 KG/M2 | HEIGHT: 65 IN | TEMPERATURE: 98.1 F | WEIGHT: 179.6 LBS | SYSTOLIC BLOOD PRESSURE: 110 MMHG | HEART RATE: 96 BPM

## 2018-02-14 DIAGNOSIS — K52.9 GASTROENTERITIS: Primary | ICD-10-CM

## 2018-02-14 PROCEDURE — 99213 OFFICE O/P EST LOW 20 MIN: CPT | Performed by: FAMILY MEDICINE

## 2018-02-14 RX ORDER — ASPIRIN 81 MG/1
81 TABLET ORAL DAILY
COMMUNITY
End: 2018-04-01 | Stop reason: SDUPTHER

## 2018-02-14 RX ORDER — ONDANSETRON 4 MG/1
4 TABLET, ORALLY DISINTEGRATING ORAL EVERY 8 HOURS SCHEDULED
Qty: 30 TABLET | Refills: 2 | Status: SHIPPED | OUTPATIENT
Start: 2018-02-14 | End: 2018-09-04 | Stop reason: SDUPTHER

## 2018-02-14 NOTE — PROGRESS NOTES
Subjective     Noemi Cogan is a 43 y o  female who presents for evaluation of nonbilious vomiting 2 times per day, diarrhea a few times per day and nausea  Symptoms have been present for 2 days  Patient denies blood in stool, dark urine, dysuria and abd pain   Patient's oral intake has been decreased for liquids and decreased for solids  Patient's urine output has been adequate  Other contacts with similar symptoms include: none   Patient denies recent travel history  Patient has not had recent ingestion of possible contaminated food, toxic plants, or inappropriate medications/poisons  The following portions of the patient's history were reviewed and updated as appropriate: allergies, current medications, past family history, past medical history, past social history, past surgical history and problem list     Review of Systems  Constitutional: negative  Respiratory: negative  Genitourinary:negative  Objective     General appearance: alert and oriented, in no acute distress  Abdomen: abnormal findings:  hyperactive bowel sounds and mild diffuse tenderness, no guarding, no rebound   Skin: Skin color, texture, turgor normal  No rashes or lesions  Assessment/Plan     Acute Gastroenteritis  1  Discussed oral rehydration, reintroduction of solid foods, signs of dehydration  2  Return or go to emergency department if worsening symptoms, blood or bile, signs of dehydration, diarrhea lasting longer than 5 days or any new concerns    Zofran PRN as in orders   rto prn

## 2018-02-14 NOTE — LETTER
February 14, 2018     Patient: Eduar Avila   YOB: 1975   Date of Visit: 2/14/2018       To Whom it May Concern:    Eduar Avila is under my professional care  She was seen in my office on 2/14/2018  She may return to work on 2/19/18  If you have any questions or concerns, please don't hesitate to call           Sincerely,          Malachi Mckeon MD        CC: No Recipients

## 2018-02-14 NOTE — PATIENT INSTRUCTIONS
Acute Nausea and Vomiting   AMBULATORY CARE:   Acute nausea and vomiting  starts suddenly, gets worse quickly, and lasts a short time  Common causes include pregnancy, alcohol, infection, and medicines  A head injury, heart attack, or inner ear imbalance can also cause acute nausea and vomiting  Seek care immediately if:   · You see blood in your vomit or your bowel movements  · You have sudden, severe pain in your chest and upper abdomen after hard vomiting or retching  · You have swelling in your neck and chest      · You are dizzy, cold, and thirsty and your eyes and mouth are dry  · You are urinating very little or not at all  · You have muscle weakness, leg cramps, and trouble breathing  · Your heart is beating much faster than normal      · You continue to vomit for more than 48 hours  Contact your healthcare provider if:   · You have frequent dry heaves (vomiting but nothing comes out)  · Your nausea and vomiting does not get better or go away after you use medicine  · You have questions or concerns about your condition or treatment  Treatment for acute nausea and vomiting  may include medicines to calm your stomach and stop the vomiting  You may need IV fluids if you are dehydrated  Prevent or manage acute nausea and vomiting:   · Do not drink alcohol  Alcohol may upset or irritate your stomach  Too much alcohol can also cause acute nausea and vomiting  · Control stress  Headaches due to stress may cause nausea and vomiting  Find ways to relax and manage your stress  Get more rest and sleep  · Drink more liquids as directed  Vomiting can lead to dehydration  It is important to drink more liquids to help replace lost body fluids  Ask your healthcare provider how much liquid to drink each day and which liquids are best for you  Your provider may recommend that you drink an oral rehydration solution (ORS)   ORS contains water, salts, and sugar that are needed to replace the lost body fluids  Ask what kind of ORS to use, how much to drink, and where to get it  · Eat smaller meals, more often  Eat small amounts of food every 2 to 3 hours, even if you are not hungry  Food in your stomach may decrease your nausea  · Talk to your healthcare provider before you take over-the-counter (OTC) medicines  These medicines can cause serious problems if you use certain other medicines, or you have a medical condition  You may have problems if you use too much or use them for longer than the label says  Follow directions on the label carefully  Follow up with your healthcare provider as directed:  Write down your questions so you remember to ask them during your visits  © 2017 [employer] Information is for End User's use only and may not be sold, redistributed or otherwise used for commercial purposes  All illustrations and images included in CareNotes® are the copyrighted property of [employer]  or [employer]  The above information is an  only  It is not intended as medical advice for individual conditions or treatments  Talk to your doctor, nurse or pharmacist before following any medical regimen to see if it is safe and effective for you

## 2018-02-15 ENCOUNTER — OFFICE VISIT (OUTPATIENT)
Dept: FAMILY MEDICINE CLINIC | Facility: CLINIC | Age: 43
End: 2018-02-15
Payer: COMMERCIAL

## 2018-02-15 VITALS
HEART RATE: 80 BPM | RESPIRATION RATE: 16 BRPM | WEIGHT: 179 LBS | TEMPERATURE: 97.8 F | DIASTOLIC BLOOD PRESSURE: 70 MMHG | BODY MASS INDEX: 29.82 KG/M2 | SYSTOLIC BLOOD PRESSURE: 106 MMHG | HEIGHT: 65 IN

## 2018-02-15 DIAGNOSIS — G89.29 CHRONIC BILATERAL LOW BACK PAIN WITHOUT SCIATICA: Primary | ICD-10-CM

## 2018-02-15 DIAGNOSIS — M54.50 CHRONIC BILATERAL LOW BACK PAIN WITHOUT SCIATICA: Primary | ICD-10-CM

## 2018-02-15 DIAGNOSIS — Z79.899 ENCOUNTER FOR LONG-TERM (CURRENT) USE OF MEDICATIONS: ICD-10-CM

## 2018-02-15 DIAGNOSIS — M79.7 FIBROMYALGIA: ICD-10-CM

## 2018-02-15 PROBLEM — R10.13 ABDOMINAL PAIN, EPIGASTRIC: Status: ACTIVE | Noted: 2017-11-08

## 2018-02-15 PROBLEM — J45.909 REACTIVE AIRWAY DISEASE: Status: ACTIVE | Noted: 2017-09-10

## 2018-02-15 PROBLEM — R53.82 CHRONIC FATIGUE: Status: ACTIVE | Noted: 2017-08-25

## 2018-02-15 PROBLEM — T39.95XA ANALGESIC REBOUND HEADACHE: Status: ACTIVE | Noted: 2017-05-11

## 2018-02-15 PROBLEM — M21.40 FLAT FOOT: Status: ACTIVE | Noted: 2018-01-19

## 2018-02-15 PROBLEM — G44.40 ANALGESIC REBOUND HEADACHE: Status: ACTIVE | Noted: 2017-05-11

## 2018-02-15 PROBLEM — E03.9 ADULT HYPOTHYROIDISM: Status: ACTIVE | Noted: 2017-08-25

## 2018-02-15 PROBLEM — D50.9 IRON DEFICIENCY ANEMIA: Status: ACTIVE | Noted: 2017-08-01

## 2018-02-15 PROCEDURE — 99213 OFFICE O/P EST LOW 20 MIN: CPT | Performed by: NURSE PRACTITIONER

## 2018-02-15 RX ORDER — CYCLOBENZAPRINE HCL 5 MG
5 TABLET ORAL
Qty: 30 TABLET | Refills: 3 | Status: SHIPPED | OUTPATIENT
Start: 2018-02-15 | End: 2018-06-04 | Stop reason: SDUPTHER

## 2018-02-15 NOTE — PROGRESS NOTES
Assessment/Plan:    No problem-specific Assessment & Plan notes found for this encounter  Diagnoses and all orders for this visit:    Chronic bilateral low back pain without sciatica  -     cyclobenzaprine (FLEXERIL) 5 mg tablet; Take 1 tablet (5 mg total) by mouth daily at bedtime as needed for muscle spasms  -     traMADol-acetaminophen (ULTRACET) 37 5-325 mg per tablet; Take 1 tablet by mouth daily as needed for moderate pain    Fibromyalgia  -     cyclobenzaprine (FLEXERIL) 5 mg tablet; Take 1 tablet (5 mg total) by mouth daily at bedtime as needed for muscle spasms  -     traMADol-acetaminophen (ULTRACET) 37 5-325 mg per tablet; Take 1 tablet by mouth daily as needed for moderate pain    Other orders  -     Discontinue: traMADol-acetaminophen (ULTRACET) 37 5-325 mg per tablet; Take 1 tablet by mouth daily as needed        Patient clinically stable at this time  Cont with current plan of care  Cont with stretches and exercises, good back hygiene  Take meds sparingly  RTO as recommended and PRN      Patient Instructions   Chronic Back Pain   AMBULATORY CARE:   Chronic back pain  is back pain that lasts 3 months or longer  This may include pain that has not been controlled or does not improve with treatment  Your back pain may cause weakness or pain that spreads to your arms or legs  Seek immediate care for the following symptoms:   · Severe pain    · New signs of numbness or weakness, especially in your lower back, legs, arms, or genital area    · Unable to control of your bladder or bowel movements    · A fever or sudden weight loss  Contact your healthcare provider if:   · You have new or worsened pain  · You have questions or concerns about your condition or care  Treatment for chronic back pain  may include any of the following:  · NSAIDs , such as ibuprofen, help decrease swelling, pain, and fever  This medicine is available with or without a doctor's order   NSAIDs can cause stomach bleeding or kidney problems in certain people  If you take blood thinner medicine, always ask if NSAIDs are safe for you  Always read the medicine label and follow directions  Do not give these medicines to children under 10months of age without direction from your child's healthcare provider  · Acetaminophen  decreases pain  It is available without a doctor's order  Ask how much to take and how often to take it  Follow directions  Acetaminophen can cause liver damage if not taken correctly  · Prescription pain medicine  may be given  Ask how to take this medicine safely  · Muscle relaxers  help decrease muscle spasms and back pain  · Take your medicine as directed  Contact your healthcare provider if you think your medicine is not helping or if you have side effects  Tell him if you are allergic to any medicine  Keep a list of the medicines, vitamins, and herbs you take  Include the amounts, and when and why you take them  Bring the list or the pill bottles to follow-up visits  Carry your medicine list with you in case of an emergency  Manage your chronic back pain:   · Apply heat  on your back for 20 to 30 minutes every 2 hours for as many days as directed  Heat helps decrease pain and muscle spasms  · Stay active  as much as you can without causing more pain  Ask your healthcare provider what exercises are right for you  Do not sit or lie down for long periods  This could make your back pain worse  Avoid heavy lifting until your pain is gone  · Go to physical therapy as directed  A physical therapist teaches you exercises to help improve movement and strength, and to decrease pain  Follow up with your healthcare provider as directed: You may be referred to a sports medicine or spine specialist  Write down your questions so you remember to ask them during your visits    © 2017 [employer] Information is for End User's use only and may not be sold, redistributed or otherwise used for commercial purposes  All illustrations and images included in CareNotes® are the copyrighted property of [employer]  or [employer]  The above information is an  only  It is not intended as medical advice for individual conditions or treatments  Talk to your doctor, nurse or pharmacist before following any medical regimen to see if it is safe and effective for you  Return in about 3 months (around 5/15/2018)  Subjective:      Patient ID: Thejerzy Boyer is a 43 y o  female  Chief Complaint   Patient presents with    Medication Refill       Medication Refill   This is a chronic (meds for chronic low back pain) problem  The current episode started more than 1 year ago  The problem occurs intermittently  The problem has been waxing and waning  Associated symptoms include fatigue  Pertinent negatives include no fever  Associated symptoms comments: none  The symptoms are aggravated by bending, twisting and stress  She has tried acetaminophen, heat, NSAIDs, rest, walking and position changes (muscle relaxer, physical therapy) for the symptoms  Improvement on treatment: good pain relief on PRN flexeril (5) and ultracet at bedtime  The following portions of the patient's history were reviewed and updated as appropriate: allergies, current medications, past family history, past medical history, past social history, past surgical history and problem list     Review of Systems   Constitutional: Positive for fatigue  Negative for fever and unexpected weight change  HENT: Negative  Eyes: Negative for visual disturbance  Respiratory: Negative  Cardiovascular: Negative  Musculoskeletal: Positive for back pain  Neurological: Negative            Current Outpatient Prescriptions   Medication Sig Dispense Refill    traMADol-acetaminophen (ULTRACET) 37 5-325 mg per tablet Take 1 tablet by mouth daily as needed for moderate pain 30 tablet 3    albuterol (2 5 mg/3 mL) 0  083 % nebulizer solution Take 2 5 mg by nebulization every 6 (six) hours as needed for wheezing      amitriptyline (ELAVIL) 50 mg tablet Take 75 mg by mouth daily at bedtime        aspirin (ECOTRIN LOW STRENGTH) 81 mg EC tablet Take 81 mg by mouth daily      buPROPion (ZYBAN) 150 MG 12 hr tablet Take 300 mg by mouth daily        calcitriol (ROCALTROL) 0 5 MCG capsule Take 0 5 mcg by mouth 2 (two) times a day        Calcium Carb-Cholecalciferol (CALCIUM 600 + D PO) Take by mouth 2 (two) times a day Takes 3 tabs each dose=1800mg      cetirizine (ZyrTEC) 10 mg tablet Take 10 mg by mouth daily      Cholecalciferol (VITAMIN D PO) Take 50,000 Units by mouth once a week      clonazePAM (KlonoPIN) 1 mg tablet Take 2 mg by mouth daily at bedtime        Cyanocobalamin (VITAMIN B 12 PO) Take 1,000 mcg by mouth daily      cyclobenzaprine (FLEXERIL) 5 mg tablet Take 1 tablet (5 mg total) by mouth daily at bedtime as needed for muscle spasms 30 tablet 3    desvenlafaxine succinate (PRISTIQ) 50 mg 24 hr tablet Take 50 mg by mouth daily      folic acid (FOLVITE) 1 mg tablet Take 1 mg by mouth daily      gabapentin (NEURONTIN) 300 mg capsule Take 300 mg by mouth 2 (two) times a day      gabapentin (NEURONTIN) 600 MG tablet Take 300 mg by mouth daily at bedtime      levothyroxine 175 mcg tablet Take 175 mcg by mouth Five days a week, mon- fri       magnesium oxide (MAG-OX) 400 mg Take 400 mg by mouth 3 (three) times a day      montelukast (SINGULAIR) 10 mg tablet Take 10 mg by mouth daily at bedtime      Multiple Vitamin (MULTIVITAMIN) tablet Take 1 tablet by mouth daily      omeprazole (PriLOSEC) 20 mg delayed release capsule Take 40 mg by mouth daily        ondansetron (ZOFRAN) 4 mg tablet Take 1 tablet by mouth every 8 (eight) hours as needed for nausea or vomiting for up to 6 doses 6 tablet 0    ondansetron (ZOFRAN-ODT) 4 mg disintegrating tablet Take 1 tablet (4 mg total) by mouth every 8 (eight) hours 30 tablet 2    sucralfate (CARAFATE) 1 g tablet Take 1 g by mouth as needed        topiramate (TOPAMAX) 50 MG tablet Take 50 mg by mouth 2 (two) times a day      Zolpidem Tartrate (AMBIEN PO) Take 10 mg by mouth daily at bedtime       No current facility-administered medications for this visit  Objective:    /70 (BP Location: Left arm, Patient Position: Sitting, Cuff Size: Adult)   Pulse 80   Temp 97 8 °F (36 6 °C) (Temporal)   Resp 16   Ht 5' 5" (1 651 m)   Wt 81 2 kg (179 lb)   LMP 09/06/2017   BMI 29 79 kg/m²       Labs:  none     Physical Exam   Constitutional: She is oriented to person, place, and time  She appears well-developed and well-nourished  No distress  Eyes: Conjunctivae and EOM are normal  Pupils are equal, round, and reactive to light  Neck: Normal range of motion  Neck supple  No thyromegaly present  Cardiovascular: Normal rate, regular rhythm, normal heart sounds and intact distal pulses  No murmur heard  Pulmonary/Chest: Effort normal and breath sounds normal  No respiratory distress  Abdominal: Soft  Bowel sounds are normal  There is no tenderness  Musculoskeletal:        Lumbar back: She exhibits decreased range of motion and pain  Neurological: She is alert and oriented to person, place, and time  Gait abnormal    Skin: Skin is warm and dry  No rash noted  No pallor  Psychiatric: She has a normal mood and affect                Casey Cain[address]

## 2018-02-15 NOTE — PATIENT INSTRUCTIONS
Chronic Back Pain   AMBULATORY CARE:   Chronic back pain  is back pain that lasts 3 months or longer  This may include pain that has not been controlled or does not improve with treatment  Your back pain may cause weakness or pain that spreads to your arms or legs  Seek immediate care for the following symptoms:   · Severe pain    · New signs of numbness or weakness, especially in your lower back, legs, arms, or genital area    · Unable to control of your bladder or bowel movements    · A fever or sudden weight loss  Contact your healthcare provider if:   · You have new or worsened pain  · You have questions or concerns about your condition or care  Treatment for chronic back pain  may include any of the following:  · NSAIDs , such as ibuprofen, help decrease swelling, pain, and fever  This medicine is available with or without a doctor's order  NSAIDs can cause stomach bleeding or kidney problems in certain people  If you take blood thinner medicine, always ask if NSAIDs are safe for you  Always read the medicine label and follow directions  Do not give these medicines to children under 10months of age without direction from your child's healthcare provider  · Acetaminophen  decreases pain  It is available without a doctor's order  Ask how much to take and how often to take it  Follow directions  Acetaminophen can cause liver damage if not taken correctly  · Prescription pain medicine  may be given  Ask how to take this medicine safely  · Muscle relaxers  help decrease muscle spasms and back pain  · Take your medicine as directed  Contact your healthcare provider if you think your medicine is not helping or if you have side effects  Tell him if you are allergic to any medicine  Keep a list of the medicines, vitamins, and herbs you take  Include the amounts, and when and why you take them  Bring the list or the pill bottles to follow-up visits   Carry your medicine list with you in case of an emergency  Manage your chronic back pain:   · Apply heat  on your back for 20 to 30 minutes every 2 hours for as many days as directed  Heat helps decrease pain and muscle spasms  · Stay active  as much as you can without causing more pain  Ask your healthcare provider what exercises are right for you  Do not sit or lie down for long periods  This could make your back pain worse  Avoid heavy lifting until your pain is gone  · Go to physical therapy as directed  A physical therapist teaches you exercises to help improve movement and strength, and to decrease pain  Follow up with your healthcare provider as directed: You may be referred to a sports medicine or spine specialist  Write down your questions so you remember to ask them during your visits  © 2017 [employer] Information is for End User's use only and may not be sold, redistributed or otherwise used for commercial purposes  All illustrations and images included in CareNotes® are the copyrighted property of [employer]  or [facility]  The above information is an  only  It is not intended as medical advice for individual conditions or treatments  Talk to your doctor, nurse or pharmacist before following any medical regimen to see if it is safe and effective for you

## 2018-02-23 DIAGNOSIS — M79.7 FIBROMYALGIA: Primary | ICD-10-CM

## 2018-02-23 RX ORDER — GABAPENTIN 300 MG/1
CAPSULE ORAL
Qty: 120 CAPSULE | Refills: 6 | Status: SHIPPED | OUTPATIENT
Start: 2018-02-23 | End: 2018-09-07 | Stop reason: SDUPTHER

## 2018-03-12 DIAGNOSIS — J30.2 CHRONIC SEASONAL ALLERGIC RHINITIS, UNSPECIFIED TRIGGER: Primary | ICD-10-CM

## 2018-03-12 DIAGNOSIS — Z98.84 S/P GASTRIC BYPASS: ICD-10-CM

## 2018-03-12 DIAGNOSIS — K52.9 GASTROENTERITIS: ICD-10-CM

## 2018-03-12 RX ORDER — FOLIC ACID 1 MG/1
TABLET ORAL
Qty: 30 TABLET | Refills: 6 | Status: SHIPPED | OUTPATIENT
Start: 2018-03-12 | End: 2018-03-13 | Stop reason: SDUPTHER

## 2018-03-12 RX ORDER — ONDANSETRON 4 MG/1
TABLET, ORALLY DISINTEGRATING ORAL
Qty: 30 TABLET | Refills: 2 | OUTPATIENT
Start: 2018-03-12

## 2018-03-12 RX ORDER — CETIRIZINE HYDROCHLORIDE 10 MG/1
TABLET ORAL
Qty: 30 TABLET | Refills: 6 | Status: SHIPPED | OUTPATIENT
Start: 2018-03-12 | End: 2018-03-13 | Stop reason: SDUPTHER

## 2018-03-12 NOTE — TELEPHONE ENCOUNTER
CALLED PT AND SHE SAID SHE DIDN'T ASK FOR REFILL THAT PHARMACY AUTOMATICALLY SENDS REQUEST, AND SHE DOESN'T WANT MED

## 2018-03-13 DIAGNOSIS — J30.2 CHRONIC SEASONAL ALLERGIC RHINITIS, UNSPECIFIED TRIGGER: ICD-10-CM

## 2018-03-13 DIAGNOSIS — Z98.84 S/P GASTRIC BYPASS: ICD-10-CM

## 2018-03-13 RX ORDER — CETIRIZINE HYDROCHLORIDE 10 MG/1
TABLET ORAL
Qty: 30 TABLET | Refills: 6 | Status: SHIPPED | OUTPATIENT
Start: 2018-03-13 | End: 2019-04-08

## 2018-03-13 RX ORDER — FOLIC ACID 1 MG/1
TABLET ORAL
Qty: 30 TABLET | Refills: 6 | Status: SHIPPED | OUTPATIENT
Start: 2018-03-13 | End: 2019-04-15 | Stop reason: SDUPTHER

## 2018-03-14 ENCOUNTER — HOSPITAL ENCOUNTER (EMERGENCY)
Facility: HOSPITAL | Age: 43
Discharge: HOME | End: 2018-03-14
Attending: EMERGENCY MEDICINE | Admitting: EMERGENCY MEDICINE
Payer: COMMERCIAL

## 2018-03-14 VITALS
SYSTOLIC BLOOD PRESSURE: 119 MMHG | WEIGHT: 180 LBS | BODY MASS INDEX: 29.95 KG/M2 | OXYGEN SATURATION: 99 % | TEMPERATURE: 98 F | DIASTOLIC BLOOD PRESSURE: 72 MMHG | HEART RATE: 68 BPM | RESPIRATION RATE: 20 BRPM

## 2018-03-14 DIAGNOSIS — E83.51 HYPOCALCEMIA: Primary | ICD-10-CM

## 2018-03-14 LAB
ALBUMIN SERPL BCP-MCNC: 3.8 G/DL (ref 3.5–5)
ALP SERPL-CCNC: 83 U/L (ref 46–116)
ALT SERPL W P-5'-P-CCNC: 27 U/L (ref 12–78)
ANION GAP SERPL CALCULATED.3IONS-SCNC: 11 MMOL/L (ref 4–13)
AST SERPL W P-5'-P-CCNC: 28 U/L (ref 5–45)
BASOPHILS # BLD AUTO: 0.1 THOUSANDS/ΜL (ref 0–0.1)
BASOPHILS NFR BLD AUTO: 2 % (ref 0–1)
BILIRUB SERPL-MCNC: 0.3 MG/DL (ref 0.2–1)
BUN SERPL-MCNC: 14 MG/DL (ref 5–25)
CA-I BLD-SCNC: 1.04 MMOL/L (ref 1.12–1.32)
CALCIUM SERPL-MCNC: 7.9 MG/DL (ref 8.3–10.1)
CHLORIDE SERPL-SCNC: 106 MMOL/L (ref 100–108)
CO2 SERPL-SCNC: 25 MMOL/L (ref 21–32)
CREAT SERPL-MCNC: 0.75 MG/DL (ref 0.6–1.3)
EOSINOPHIL # BLD AUTO: 0.2 THOUSAND/ΜL (ref 0–0.61)
EOSINOPHIL NFR BLD AUTO: 3 % (ref 0–6)
ERYTHROCYTE [DISTWIDTH] IN BLOOD BY AUTOMATED COUNT: 13.7 % (ref 11.6–15.1)
GFR SERPL CREATININE-BSD FRML MDRD: 99 ML/MIN/1.73SQ M
GLUCOSE SERPL-MCNC: 92 MG/DL (ref 65–140)
HCT VFR BLD AUTO: 40.1 % (ref 37–47)
HGB BLD-MCNC: 13.2 G/DL (ref 12–16)
LYMPHOCYTES # BLD AUTO: 1.5 THOUSANDS/ΜL (ref 0.6–4.47)
LYMPHOCYTES NFR BLD AUTO: 25 % (ref 14–44)
MAGNESIUM SERPL-MCNC: 2.2 MG/DL (ref 1.6–2.6)
MCH RBC QN AUTO: 31.2 PG (ref 27–31)
MCHC RBC AUTO-ENTMCNC: 33 G/DL (ref 31.4–37.4)
MCV RBC AUTO: 95 FL (ref 82–98)
MONOCYTES # BLD AUTO: 0.4 THOUSAND/ΜL (ref 0.17–1.22)
MONOCYTES NFR BLD AUTO: 6 % (ref 4–12)
NEUTROPHILS # BLD AUTO: 4 THOUSANDS/ΜL (ref 1.85–7.62)
NEUTS SEG NFR BLD AUTO: 65 % (ref 43–75)
NRBC BLD AUTO-RTO: 0 /100 WBCS
PLATELET # BLD AUTO: 355 THOUSANDS/UL (ref 130–400)
PMV BLD AUTO: 7.6 FL (ref 8.9–12.7)
POTASSIUM SERPL-SCNC: 4.2 MMOL/L (ref 3.5–5.3)
PROT SERPL-MCNC: 7.7 G/DL (ref 6.4–8.2)
RBC # BLD AUTO: 4.23 MILLION/UL (ref 4.2–5.4)
SODIUM SERPL-SCNC: 142 MMOL/L (ref 136–145)
WBC # BLD AUTO: 6.1 THOUSAND/UL (ref 4.8–10.8)

## 2018-03-14 PROCEDURE — 83735 ASSAY OF MAGNESIUM: CPT | Performed by: EMERGENCY MEDICINE

## 2018-03-14 PROCEDURE — 36415 COLL VENOUS BLD VENIPUNCTURE: CPT | Performed by: EMERGENCY MEDICINE

## 2018-03-14 PROCEDURE — 82330 ASSAY OF CALCIUM: CPT | Performed by: EMERGENCY MEDICINE

## 2018-03-14 PROCEDURE — 99284 EMERGENCY DEPT VISIT MOD MDM: CPT

## 2018-03-14 PROCEDURE — 80053 COMPREHEN METABOLIC PANEL: CPT | Performed by: EMERGENCY MEDICINE

## 2018-03-14 PROCEDURE — 93005 ELECTROCARDIOGRAM TRACING: CPT | Performed by: EMERGENCY MEDICINE

## 2018-03-14 PROCEDURE — 85025 COMPLETE CBC W/AUTO DIFF WBC: CPT | Performed by: EMERGENCY MEDICINE

## 2018-03-14 RX ORDER — CALCITRIOL 0.25 UG/1
0.5 CAPSULE, LIQUID FILLED ORAL DAILY
Status: DISCONTINUED | OUTPATIENT
Start: 2018-03-14 | End: 2018-03-14 | Stop reason: HOSPADM

## 2018-03-14 RX ORDER — CALCITRIOL 0.25 UG/1
0.5 CAPSULE, LIQUID FILLED ORAL DAILY
Status: DISCONTINUED | OUTPATIENT
Start: 2018-03-15 | End: 2018-03-14

## 2018-03-14 RX ADMIN — CALCITRIOL 0.5 MCG: 0.25 CAPSULE, LIQUID FILLED ORAL at 19:50

## 2018-03-14 NOTE — ED PROVIDER NOTES
History  Chief Complaint   Patient presents with    Facial Numbness     states history of hypocalcemia  was told to watch for symptoms of facial numbness, states developing this about 3pm with tingling to hands and feet bilaterally     42 yowf c/o numbness in hands, feet and face that started about 4 hours ago  Symptoms constant  C/w episodes of low calcium that she has had in the past s/p thyroid surgery  + associated nausea and general malaise  No fever  No vomiting or diarrhea  Has not missed any of her meds  History provided by:  Patient   used: No        Prior to Admission Medications   Prescriptions Last Dose Informant Patient Reported? Taking?    Calcium Carb-Cholecalciferol (CALCIUM 600 + D PO)  Self Yes No   Sig: Take by mouth 2 (two) times a day Takes 3 tabs each dose=1800mg   Cholecalciferol (VITAMIN D PO)  Self Yes No   Sig: Take 50,000 Units by mouth once a week   Cyanocobalamin (VITAMIN B 12 PO)  Self Yes No   Sig: Take 1,000 mcg by mouth daily   Multiple Vitamin (MULTIVITAMIN) tablet  Self Yes No   Sig: Take 1 tablet by mouth daily   Zolpidem Tartrate (AMBIEN PO)  Self Yes No   Sig: Take 10 mg by mouth daily at bedtime   albuterol (2 5 mg/3 mL) 0 083 % nebulizer solution  Self Yes No   Sig: Take 2 5 mg by nebulization every 6 (six) hours as needed for wheezing   amitriptyline (ELAVIL) 50 mg tablet  Self Yes No   Sig: Take 75 mg by mouth daily at bedtime     aspirin (ECOTRIN LOW STRENGTH) 81 mg EC tablet  Self Yes No   Sig: Take 81 mg by mouth daily   buPROPion (ZYBAN) 150 MG 12 hr tablet  Self Yes No   Sig: Take 300 mg by mouth daily     calcitriol (ROCALTROL) 0 5 MCG capsule  Self Yes No   Sig: Take 0 5 mcg by mouth 2 (two) times a day     cetirizine (ZyrTEC) 10 mg tablet   No No   Sig: TAKE ONE TABLET BY MOUTH EVERY DAY AS NEEDED   clonazePAM (KlonoPIN) 1 mg tablet  Self Yes No   Sig: Take 2 mg by mouth daily at bedtime     cyclobenzaprine (FLEXERIL) 5 mg tablet No No   Sig: Take 1 tablet (5 mg total) by mouth daily at bedtime as needed for muscle spasms   desvenlafaxine succinate (PRISTIQ) 50 mg 24 hr tablet  Self Yes No   Sig: Take 50 mg by mouth daily   folic acid (FOLVITE) 1 mg tablet   No No   Sig: TAKE ONE TABLET BY MOUTH EVERY DAY   gabapentin (NEURONTIN) 300 mg capsule   No No   Sig: TAKE ONE CAPSULE BY MOUTH TWICE A DAY AND 2 CAPSULES BY MOUTH IN THE EVENING EACH DAY     gabapentin (NEURONTIN) 600 MG tablet  Self Yes No   Sig: Take 300 mg by mouth daily at bedtime   levothyroxine 175 mcg tablet  Self Yes No   Sig: Take 175 mcg by mouth Five days a week, mon- fri    magnesium oxide (MAG-OX) 400 mg  Self Yes No   Sig: Take 400 mg by mouth 3 (three) times a day   montelukast (SINGULAIR) 10 mg tablet  Self Yes No   Sig: Take 10 mg by mouth daily at bedtime   omeprazole (PriLOSEC) 20 mg delayed release capsule  Self Yes No   Sig: Take 40 mg by mouth daily     ondansetron (ZOFRAN) 4 mg tablet  Self No No   Sig: Take 1 tablet by mouth every 8 (eight) hours as needed for nausea or vomiting for up to 6 doses   ondansetron (ZOFRAN-ODT) 4 mg disintegrating tablet   No No   Sig: Take 1 tablet (4 mg total) by mouth every 8 (eight) hours   sucralfate (CARAFATE) 1 g tablet  Self Yes No   Sig: Take 1 g by mouth as needed     topiramate (TOPAMAX) 50 MG tablet  Self Yes No   Sig: Take 50 mg by mouth 2 (two) times a day   traMADol-acetaminophen (ULTRACET) 37 5-325 mg per tablet   No No   Sig: Take 1 tablet by mouth daily as needed for moderate pain      Facility-Administered Medications: None       Past Medical History:   Diagnosis Date    Asthma     Cancer ([facility] [address] )     thyroid    Disease of thyroid gland     Gastritis     Hypocalcemia     Hypoglycemia     Migraine     Psychiatric disorder     depression       Past Surgical History:   Procedure Laterality Date    ABDOMINAL SURGERY  2011    gastric bypass    CHOLECYSTECTOMY  1993    lap    COLONOSCOPY N/A 12/12/2016 Procedure: COLONOSCOPY;  Surgeon: Sammy Huber MD;  Location[facility]; Service:     ESOPHAGOGASTRODUODENOSCOPY N/A 12/12/2016    Procedure: ESOPHAGOGASTRODUODENOSCOPY (EGD); Surgeon: Sammy Huber MD;  Location: [facility] GI LAB; Service:    Juvencio Bradleymiriam TUBAL LIGATION      GASTRIC BYPASS      HERNIA REPAIR      hiatel hernia repair    HYSTERECTOMY      INTUSSUSCEPTION REPAIR  2016    THYROID SURGERY      THYROIDECTOMY  2016       Family History   Problem Relation Age of Onset    Lupus Mother     Crohn's disease Mother     Diabetes Father      I have reviewed and agree with the history as documented  Social History   Substance Use Topics    Smoking status: Never Smoker    Smokeless tobacco: Never Used    Alcohol use Yes      Comment: occ        Review of Systems   Constitutional: Negative  Negative for fever  HENT: Negative  Eyes: Negative  Respiratory: Negative  Negative for cough and shortness of breath  Cardiovascular: Negative  Negative for chest pain  Gastrointestinal: Positive for nausea  Negative for abdominal pain, diarrhea and vomiting  Genitourinary: Negative  Negative for dysuria and flank pain  Musculoskeletal: Positive for back pain  Negative for myalgias  Skin: Negative  Negative for rash  Neurological: Positive for numbness  Negative for dizziness and headaches  Hematological: Does not bruise/bleed easily  Psychiatric/Behavioral: Negative  All other systems reviewed and are negative        Physical Exam  ED Triage Vitals [03/14/18 1734]   Temperature Pulse Respirations Blood Pressure SpO2   98 °F (36 7 °C) 83 20 115/72 97 %      Temp Source Heart Rate Source Patient Position - Orthostatic VS BP Location FiO2 (%)   Tympanic Monitor Sitting Right arm --      Pain Score       No Pain           Orthostatic Vital Signs  Vitals:    03/14/18 1734 03/14/18 1815 03/14/18 1830 03/14/18 1845   BP: 115/72 130/72 97/62 115/67   Pulse: 83 72 68 70   Patient Position - Orthostatic VS: Sitting          Physical Exam   Constitutional: She is oriented to person, place, and time  She appears well-developed and well-nourished  No distress  HENT:   Head: Normocephalic and atraumatic  Eyes: Conjunctivae are normal  Pupils are equal, round, and reactive to light  Neck: Normal range of motion  Neck supple  Cardiovascular: Normal rate, regular rhythm and normal heart sounds  No murmur heard  Pulmonary/Chest: Effort normal and breath sounds normal  No respiratory distress  Abdominal: Soft  Bowel sounds are normal  She exhibits no distension  There is no tenderness  Musculoskeletal: Normal range of motion  She exhibits no edema or deformity  Negative Trousseau sign with BP cuff up for 3 min   Neurological: She is alert and oriented to person, place, and time  No cranial nerve deficit  She exhibits normal muscle tone  Negative facial nerve spasm with tapping/neg  [place] sign   Skin: Skin is warm and dry  No rash noted  She is not diaphoretic  No pallor  Psychiatric: She has a normal mood and affect  Her behavior is normal    Nursing note and vitals reviewed        ED Medications  Medications   calcitriol (ROCALTROL) capsule 0 5 mcg (not administered)       Diagnostic Studies  Results Reviewed     Procedure Component Value Units Date/Time    Comprehensive metabolic panel [[id number]]  (Abnormal) Collected:  03/14/18 1818    Lab Status:  Final result Specimen:  Blood from Arm, Left Updated:  03/14/18 1843     Sodium 142 mmol/L      Potassium 4 2 mmol/L      Chloride 106 mmol/L      CO2 25 mmol/L      Anion Gap 11 mmol/L      BUN 14 mg/dL      Creatinine 0 75 mg/dL      Glucose 92 mg/dL      Calcium 7 9 (L) mg/dL      AST 28 U/L      ALT 27 U/L      Alkaline Phosphatase 83 U/L      Total Protein 7 7 g/dL      Albumin 3 8 g/dL      Total Bilirubin 0 30 mg/dL      eGFR 99 ml/min/1 73sq m     Narrative:         National Kidney Disease Education Program recommendations are as follows:  GFR calculation is accurate only with a steady state creatinine  Chronic Kidney disease less than 60 ml/min/1 73 sq  meters  Kidney failure less than 15 ml/min/1 73 sq  meters      Magnesium [[id number]]  (Normal) Collected:  03/14/18 1818    Lab Status:  Final result Specimen:  Blood from Arm, Left Updated:  03/14/18 1843     Magnesium 2 2 mg/dL     CBC and differential [[id number]]  (Abnormal) Collected:  03/14/18 1818    Lab Status:  Final result Specimen:  Blood from Arm, Left Updated:  03/14/18 1827     WBC 6 10 Thousand/uL      RBC 4 23 Million/uL      Hemoglobin 13 2 g/dL      Hematocrit 40 1 %      MCV 95 fL      MCH 31 2 (H) pg      MCHC 33 0 g/dL      RDW 13 7 %      MPV 7 6 (L) fL      Platelets 090 Thousands/uL      nRBC 0 /100 WBCs      Neutrophils Relative 65 %      Lymphocytes Relative 25 %      Monocytes Relative 6 %      Eosinophils Relative 3 %      Basophils Relative 2 (H) %      Neutrophils Absolute 4 00 Thousands/µL      Lymphocytes Absolute 1 50 Thousands/µL      Monocytes Absolute 0 40 Thousand/µL      Eosinophils Absolute 0 20 Thousand/µL      Basophils Absolute 0 10 Thousands/µL     Calcium, ionized [[id number]]  (Abnormal) Collected:  03/14/18 1818    Lab Status:  Final result Specimen:  Blood from Arm, Left Updated:  03/14/18 1826     Calcium, Ionized 1 04 (L) mmol/L                  No orders to display              Procedures  ECG 12 Lead Documentation  Date/Time: 3/14/2018 6:20 PM  Performed by: Anabel Fierro  Authorized by: Anabel Fierro     Indications / Diagnosis:  Hypocalcemia  ECG reviewed by me, the ED Provider: yes    Patient location:  ED  Previous ECG:     Previous ECG:  Unavailable  Interpretation:     Interpretation: normal    Rate:     ECG rate:  69  Rhythm:     Rhythm: sinus rhythm    Ectopy:     Ectopy: none    QRS:     QRS axis:  Normal  Conduction:     Conduction: normal    ST segments:     ST segments:  Normal  T waves:     T waves: normal             Phone Contacts  ED Phone Contact    ED Course  ED Course                                MDM  Number of Diagnoses or Management Options  Diagnosis management comments: Discussed with pt's endocrinologist on call for Dr Jo Seo [phone]) who advised IV calcium and/or more oral calcium not going be helpful, needs more rocalcitrol, 0 5 mg now and increase to 0 5 bid - pt  Says she is currently taking 0 25 tid and taking 5400mg Calcium carb - doses that nurse have listed are wrong  Discussed with pt  She is to call their office tomorrow morning  CritCare Time    Disposition  Final diagnoses:   Hypocalcemia     Time reflects when diagnosis was documented in both MDM as applicable and the Disposition within this note     Time User Action Codes Description Comment    1/31/3378  4:86 PM Radha BENNETT Add [L69 78] Hypocalcemia       ED Disposition     ED Disposition Condition Comment    Discharge  Samara Matamoros discharge to home/self care  Condition at discharge: Stable        Follow-up Information     Follow up With Specialties Details Why Contact Info    your endocrinologist  Call in 1 day          Patient's Medications   Discharge Prescriptions    No medications on file     No discharge procedures on file      ED Provider  Electronically Signed by           Josiah Rodríguez MD  51/97/01 0918

## 2018-03-14 NOTE — DISCHARGE INSTRUCTIONS
Hypocalcemia - the endocrinologist wants you to take extra calcitriol tonight plus increase to 0 5 mcg twice a day and call their office tomorrow morning  Continue the 5400mg Calcium and other meds as usual   Return to the ER if any worsening of symptoms  WHAT YOU NEED TO KNOW:   Hypocalcemia is a low level of calcium in your blood  It occurs when your body loses too much calcium or does not absorb enough from the foods you eat  DISCHARGE INSTRUCTIONS:   Medicines:   · Medicines  will be given to bring your calcium and vitamin D levels back to normal  You may also need medicines to prevent bone loss  · Take your medicine as directed  Contact your healthcare provider if you think your medicine is not helping or if you have side effects  Tell him of her if you are allergic to any medicine  Keep a list of the medicines, vitamins, and herbs you take  Include the amounts, and when and why you take them  Bring the list or the pill bottles to follow-up visits  Carry your medicine list with you in case of an emergency  Follow up with your healthcare provider or endocrinologist every 3 to 6 months, or as directed: You will need to return to have your calcium levels checked  Bring a list of any questions you have so you remember to ask them during your visits  Eat foods rich in calcium:  Foods that contain calcium include milk, yogurt, cereals, and cheese  Leafy green vegetables, oranges, canned salmon, shrimp, and peanuts also contain calcium  Do not have caffeine or alcohol  These can slow your body's ability to absorb calcium  You may need to meet with a dietitian to help plan the best meals for you  Get safe amounts of sunlight: You may need to expose your skin to more sunlight if your body lacks vitamin D  Ask your healthcare provider how to safely expose yourself to UV light if you need it  Do not smoke: If you smoke, it is never too late to quit   Smoking increases the amount of calcium that leaves your body through your urine  Ask your healthcare provider for information if you need help quitting  Contact your healthcare provider or endocrinologist if:   · You have dry skin and brittle nails  · Your symptoms do not go away, or they get worse  · You feel depressed, anxious, angry, or confused  · You have questions or concerns about your condition or care  Return to the emergency department if:   · You have a seizure  · You have a slow or uneven heartbeat and feel lightheaded  · You see or hear things that are not really there  © 2017 [address] [facility] Information is for End User's use only and may not be sold, redistributed or otherwise used for commercial purposes  All illustrations and images included in CareNotes® are the copyrighted property of A D [employer] M [employer]  or [address]  The above information is an  only  It is not intended as medical advice for individual conditions or treatments  Talk to your doctor, nurse or pharmacist before following any medical regimen to see if it is safe and effective for you

## 2018-03-15 ENCOUNTER — VBI (OUTPATIENT)
Dept: ADMINISTRATIVE | Facility: OTHER | Age: 43
End: 2018-03-15

## 2018-03-15 NOTE — TELEPHONE ENCOUNTER
NO OUTREACH FROM [facility] FOR ED VISIT ON 3/14/18 - ENDOCRINOLOGIST WAS CALLED FROM THE ED  AND TOLD PATIENT TO F/U TOMORROW MORNING AT THEIR OFFICE  PATIENT ALSO HAS A PREVIOUSLY SCHEDULED APPT ON 3/20 FOR F/U NECK PAIN WITH [facility]

## 2018-03-15 NOTE — TELEPHONE ENCOUNTER
NO OUTREACH FROM [facility]  FOR ED VISIT DATED 3/14/18  PATIENT CALLED AND SCHEDULED A F/U VISIT WITH [facility] ON 3/27/18

## 2018-03-16 LAB
ATRIAL RATE: 69 BPM
P AXIS: 38 DEGREES
PR INTERVAL: 152 MS
QRS AXIS: 14 DEGREES
QRSD INTERVAL: 88 MS
QT INTERVAL: 416 MS
QTC INTERVAL: 445 MS
T WAVE AXIS: 28 DEGREES
VENTRICULAR RATE: 69 BPM

## 2018-03-16 PROCEDURE — 93010 ELECTROCARDIOGRAM REPORT: CPT | Performed by: INTERNAL MEDICINE

## 2018-03-27 ENCOUNTER — OFFICE VISIT (OUTPATIENT)
Dept: FAMILY MEDICINE CLINIC | Facility: CLINIC | Age: 43
End: 2018-03-27
Payer: COMMERCIAL

## 2018-03-27 VITALS
BODY MASS INDEX: 29.99 KG/M2 | TEMPERATURE: 98.2 F | HEART RATE: 76 BPM | DIASTOLIC BLOOD PRESSURE: 68 MMHG | HEIGHT: 65 IN | SYSTOLIC BLOOD PRESSURE: 110 MMHG | WEIGHT: 180 LBS

## 2018-03-27 DIAGNOSIS — G89.29 CHRONIC BILATERAL LOW BACK PAIN WITHOUT SCIATICA: ICD-10-CM

## 2018-03-27 DIAGNOSIS — M54.50 CHRONIC BILATERAL LOW BACK PAIN WITHOUT SCIATICA: ICD-10-CM

## 2018-03-27 DIAGNOSIS — M54.12 CERVICAL RADICULOPATHY, ACUTE: Primary | ICD-10-CM

## 2018-03-27 DIAGNOSIS — M79.7 FIBROMYALGIA: ICD-10-CM

## 2018-03-27 PROCEDURE — 99213 OFFICE O/P EST LOW 20 MIN: CPT | Performed by: NURSE PRACTITIONER

## 2018-03-27 NOTE — PROGRESS NOTES
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  Onset of symptoms was 2 weeks ago, and have been gradually worsening since that time  Current symptoms are pain in b/l neck, trapeziuz muscles (aching and throbbing in character; 5/10 in severity) and paresthesias in b/l arms  Patient denies stiffness in shoulders and weakness in arms  Denies chest pain, h/a, shortness of breath, dizziness, vision disturbance  Patient has had recurrent self limited episodes of neck pain in the past  Previous treatments: physical therapy and neck support, NSAIDs with good effect  Requesting to return to physical therapy  The following portions of the patient's history were reviewed and updated as appropriate: allergies, current medications, past family history, past medical history, past social history, past surgical history and problem list     Review of Systems  Pertinent items are noted in HPI       Objective     /68 (BP Location: Left arm, Patient Position: Sitting, Cuff Size: Adult)   Pulse 76   Temp 98 2 °F (36 8 °C)   Ht 5' 5" (1 651 m)   Wt 81 6 kg (180 lb)   LMP 09/06/2017   BMI 29 95 kg/m²   General:   alert and oriented, in no acute distress   External Deformity:  absent   ROM Cervical Spine:  limited flexion, extension, right rotation and left rotation   Midline Tenderness:  moderate midline , b/l trapezius tenderness/spasm   Paraspinous tenderness:  moderate b/l   UE Neurologic Exam:  normal strength, normal sensation, normal reflexes     X-ray of the cervical spine: Not indicated  Assessment/Plan     Cervical pain secondary to degenerative disk disease  Muscle spasm  Discussed the cervical pain, its course and treatment  Educational material distributed  Discussed appropriate exercises    Discussed appropriate use of ice and heat  NSAIDs per medication orders  Muscle relaxants continued per medication orders  PT referral   Follow up in  2 weeks  Osmar Sweeney

## 2018-04-01 DIAGNOSIS — Z78.9 DEEP VEIN THROMBOSIS (DVT) PROPHYLAXIS PRESCRIBED AT DISCHARGE: Primary | ICD-10-CM

## 2018-04-01 DIAGNOSIS — E53.8 B12 DEFICIENCY: Primary | ICD-10-CM

## 2018-04-01 RX ORDER — LANOLIN ALCOHOL/MO/W.PET/CERES
CREAM (GRAM) TOPICAL
Qty: 30 TABLET | Refills: 6 | Status: SHIPPED | OUTPATIENT
Start: 2018-04-01 | End: 2018-08-07

## 2018-04-02 RX ORDER — ASPIRIN 81 MG/1
TABLET, DELAYED RELEASE ORAL
Qty: 30 TABLET | Refills: 3 | Status: SHIPPED | OUTPATIENT
Start: 2018-04-02 | End: 2018-07-24 | Stop reason: SDUPTHER

## 2018-04-12 ENCOUNTER — EVALUATION (OUTPATIENT)
Dept: PHYSICAL THERAPY | Facility: CLINIC | Age: 43
End: 2018-04-12
Payer: COMMERCIAL

## 2018-04-12 DIAGNOSIS — M54.12 CERVICAL RADICULOPATHY, ACUTE: Primary | ICD-10-CM

## 2018-04-12 DIAGNOSIS — M54.2 NECK PAIN: ICD-10-CM

## 2018-04-12 PROCEDURE — 97162 PT EVAL MOD COMPLEX 30 MIN: CPT | Performed by: PHYSICAL THERAPIST

## 2018-04-12 PROCEDURE — G8984 CARRY CURRENT STATUS: HCPCS | Performed by: PHYSICAL THERAPIST

## 2018-04-12 PROCEDURE — 97530 THERAPEUTIC ACTIVITIES: CPT | Performed by: PHYSICAL THERAPIST

## 2018-04-12 PROCEDURE — G8985 CARRY GOAL STATUS: HCPCS | Performed by: PHYSICAL THERAPIST

## 2018-04-12 NOTE — PROGRESS NOTES
PT Evaluation     Today's date: 2018  Patient name: Lalo Strickland  : 1975  MRN: [id number]  Referring provider: DARIAN Callaway  Dx:   Encounter Diagnosis     ICD-10-CM    1  Cervical radiculopathy, acute M54 12 Ambulatory referral to Physical Therapy   2  Neck pain M54 2        Start Time: 1800  Stop Time: 1845  Total time in clinic (min): 45 minutes    Assessment  Impairments: abnormal or restricted ROM, activity intolerance, impaired physical strength, lacks appropriate home exercise program and pain with function  Functional limitations: Work duties, self care, housheold duties, driving  Assessment details: Pt is a 43year old female who has acute on chronic neck pain which limits her functional mobility and impairs her overall QOL as she finds herself unable to utilize her arms when symptoms are at their worst and is unable to perform leisurely or work related duties without drastic inc pain  Pt feels that PT has improved her overall status in previous attempts however felt carryover despaired  Pt demo deficits with pain, ROM, strength and general activity tolerance which limit her self care, household and work relate duties at this time and would benefit from skilled OPPT to maximize return to PLOF  Understanding of Dx/Px/POC: excellent   Prognosis: good    Goals  Short term goals to be accomplished in 3 weeks:  STG1: Pt will be I with HEP  STG2: Pt will demo inc B shoulder AROM WNL without pain provocation to inc functional mob  STG3: Pt will demo inc C/S AROM WNL to improve her ability to drive safely  STG4: Pt will be able to 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    Long term goals to be accomplished in 6 weeks:  LTG1: Pt will deny pain >3/10 after 8 hour work day  LTG2: Pt will deny symptom exacerbation or requirements of A for self care or household duties due to pain over 3 week time frame    LTG3: Pt will be able to return to reading symptom free  LTG4: Pt will demo 1 MMT grade inc cervical/scap stabilzer and shoulder strength    Plan  Patient would benefit from: skilled PT  Planned modality interventions: cryotherapy and thermotherapy: hydrocollator packs  Planned therapy interventions: manual therapy, neuromuscular re-education, self care, therapeutic activities, therapeutic exercise and home exercise program  Frequency: 2x week  Duration in weeks: 6  Treatment plan discussed with: patient  Plan details:  HEP development, stretching, strengthening, A/AA/PROM, joint mobilizations, posture education, STM/MI as needed to reduce muscle tension, muscle reeducation, PLOC discussed and agreed upon with patient  Subjective Evaluation    History of Present Illness  Mechanism of injury: 30 years ago dx with HNP in neck, has had PT several times which reports status improves and then eventually returns to "poor condition " Pt reports she does not perform HEP upon DC from PT   Pt reports at worst pain radiates into hands and fingers (less distal at this time) however finds her neck and shoulder pain limits her ability to lift her arms, feels symptoms "all the way down" her back  Pt reports she acknowledges need to sit upright however often slouches  Symptoms have recently become exacerbated with new job in last 2 months, symptoms onset in past 1 month  Works as book keeper, 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 front of computer for approx 8 hours daily  Pt reports she awakens each morning with this pain  Pt can only sleep with sleeping aide  Denies pain wakes her during night  At worst symptoms, pt requires assistance for UE dressing and bathing, unable to participate in household duties  Pt enjoys reading for leisure, finds her symptoms impact her reading for prolonged period of time  Pt acknowledges she believes her R sided symptoms > L symptoms   Pt reports discomfort and modifications while driving as she is unable to turn her head to scan visual field due to neck pain, often has someone else drive for her  Pt reports she also has fibromyalgia  Pt taking muscle relaxers before bed and tramadol during her day  Pt feels if she rubs her sore areas she "feels better "   Quality of life: good    Pain  Current pain ratin  At best pain ratin  At worst pain ratin    Treatments  Previous treatment: physical therapy  Patient Goals  Patient goals for therapy: increased strength, independence with ADLs/IADLs, return to work, increased motion and decreased pain          Objective     Static Posture     Comments  Poor seated posture frequently correcting herself , FHP      Neurological Testing     Additional Neurological Details  NT (+) median nerve B    Active Range of Motion   Cervical/Thoracic Spine   Cervical    Flexion: 35 degrees   Extension: 25 degrees   Left lateral flexion: 21 degrees   Right lateral flexion: 20 degrees   Left rotation: 29 degrees   Right rotation: 40 degrees   Left Shoulder   Flexion: 130 degrees   Abduction: 115 degrees   External rotation BTH: C3   Internal rotation BTB: WFL    Right Shoulder   Flexion: 130 degrees   Abduction: 100 degrees   External rotation BTH: C3   Internal rotation BTB: WFL    Strength/Myotome Testing     Left Shoulder     Planes of Motion   Flexion: 3+   Abduction: 3+     Right Shoulder     Planes of Motion   Flexion: 3+   Abduction: 3+     Additional Strength Details  Poor cervical and scapular stabilzer strength    General Comments     Cervical/Thoracic Comments  Generally uncomofrtabl and self limiting due to fear avoidance      Flowsheet Rows    Flowsheet Row Most Recent Value   PT/OT G-Codes   Current Score  30   Projected Score  44   FOTO information reviewed  Yes   Assessment Type  Evaluation   G code set  Carrying, Moving & Handling Objects   Carrying, Moving and Handling Objects Current Status ()  CL   Carrying, Moving and Handling Objects Goal Status ()  CJ Precautions: Fibromyalgia  Hx Migraines, thyroid CA      Daily Treatment Diary     Manual                                                                                   Visits 1            Exercise Diary  4/12            Posture Edu 15'            C/S Ret 5x                                                                                                                                                                                                                            HEP Initiated/edu            Time 15'                Modalities

## 2018-04-25 ENCOUNTER — OFFICE VISIT (OUTPATIENT)
Dept: PHYSICAL THERAPY | Facility: CLINIC | Age: 43
End: 2018-04-25
Payer: COMMERCIAL

## 2018-04-25 DIAGNOSIS — M54.12 CERVICAL RADICULOPATHY, ACUTE: Primary | ICD-10-CM

## 2018-04-25 PROCEDURE — 97140 MANUAL THERAPY 1/> REGIONS: CPT

## 2018-04-25 PROCEDURE — 97110 THERAPEUTIC EXERCISES: CPT

## 2018-04-25 NOTE — PROGRESS NOTES
Daily Note     Today's date: 2018  Patient name: Destiny Arenas  : 1975  MRN: [id number]  Referring provider: DARIAN Jimenez  Dx:   Encounter Diagnosis     ICD-10-CM    1  Cervical radiculopathy, acute M54 12                   Subjective: 6/10 neck & arm pain to hands       Objective: See treatment diary below      Precautions: Fibromyalgia  Hx Migraines, thyroid CA  Daily Treatment Diary     Manual   2           Manual traction   10'            STM - scalenes/UT seated   10'                                                       Visits 1 2            Exercise Diary             Posture Edu 15' reviewed           C/S Ret 5x X 10 ea seated & supine              pulleys for flexion x 10              supine AA shldr flexion x 7 reps                                                                                                                                                                                                  HEP Initiated/edu To continue with current HEP            Time 15'                Modalities                                                             Assessment: Tolerated treatment fair  Patient would benefit from continued PT; pt presents with BUE radicular symptoms greater on the right; pt demonstrated difficulty & pain performing supine AA shoulder flexion to 90 degrees  with hands clasped; pt also with poor tolerance to pulleys ( flexion ); pt with palpable soft tissue restriction scalenes & UT ; pt followed through with adjusting her work station to improve posture       Plan: Continue per plan of care   Continue MT & therex to decrease radicular symptoms BUE  & to increase ROM both shoulders

## 2018-05-01 ENCOUNTER — APPOINTMENT (OUTPATIENT)
Dept: PHYSICAL THERAPY | Facility: CLINIC | Age: 43
End: 2018-05-01
Payer: COMMERCIAL

## 2018-05-02 ENCOUNTER — APPOINTMENT (OUTPATIENT)
Dept: PHYSICAL THERAPY | Facility: CLINIC | Age: 43
End: 2018-05-02
Payer: COMMERCIAL

## 2018-05-07 ENCOUNTER — APPOINTMENT (OUTPATIENT)
Dept: PHYSICAL THERAPY | Facility: CLINIC | Age: 43
End: 2018-05-07
Payer: COMMERCIAL

## 2018-05-09 ENCOUNTER — OFFICE VISIT (OUTPATIENT)
Dept: PHYSICAL THERAPY | Facility: CLINIC | Age: 43
End: 2018-05-09
Payer: COMMERCIAL

## 2018-05-09 ENCOUNTER — APPOINTMENT (OUTPATIENT)
Dept: PHYSICAL THERAPY | Facility: CLINIC | Age: 43
End: 2018-05-09
Payer: COMMERCIAL

## 2018-05-09 DIAGNOSIS — M54.12 CERVICAL RADICULOPATHY, ACUTE: Primary | ICD-10-CM

## 2018-05-09 PROCEDURE — 97110 THERAPEUTIC EXERCISES: CPT

## 2018-05-09 PROCEDURE — 97140 MANUAL THERAPY 1/> REGIONS: CPT

## 2018-05-09 NOTE — PROGRESS NOTES
Daily Note     Today's date: 2018  Patient name: Petty Meléndez  : 1975  MRN: [id number]  Referring provider: DARIAN Ceron  Dx:   Encounter Diagnosis     ICD-10-CM    1  Cervical radiculopathy, acute M54 12                   Subjective: Pt states she spoke to the insurance company & her insurance is active until the end of May; pt states her neck pain is a 5/10; shoulders remain weak ryan the left - unable to reach overhead & dressing/undressing  activities challenging       Objective: See treatment diary below      Precautions: Fibromyalgia  Hx Migraines, thyroid CA  Daily Treatment Diary     Manual   2 3          Manual traction   10'  10'          STM - scalenes/UT seated   10'  10'           PROM    B shlds   supine x 5 min                                         Visits 1 2  3          Exercise Diary  18          Posture Edu 15' reviewed reviewed           C/S Ret 5x X 10 ea seated & supine  X 10 ea seated & supine             pulleys for flexion x 10              supine AA shldr flexion x 7 reps  supine AA shlr flexion x 5reps ea                                                                                                                                                                                                HEP Initiated/edu To continue with current HEP            Time 15'                Modalities                                                           Assessment: Tolerated treatment well  Patient would benefit from continued PT; pt with palpable tenderness & tender TP's B  UT; pt with decreased shldr ROM bilaterally - PROM to 90 degrees flexion only; pt with poor tolerance to AAROM even in supine       Plan: Continue per plan of care   Pt to focus on posture awareness & cervical retractions to decrease UE radicular symptoms

## 2018-05-10 ENCOUNTER — OFFICE VISIT (OUTPATIENT)
Dept: FAMILY MEDICINE CLINIC | Facility: CLINIC | Age: 43
End: 2018-05-10
Payer: COMMERCIAL

## 2018-05-10 VITALS
BODY MASS INDEX: 30.32 KG/M2 | TEMPERATURE: 97.8 F | WEIGHT: 182 LBS | DIASTOLIC BLOOD PRESSURE: 58 MMHG | HEIGHT: 65 IN | HEART RATE: 76 BPM | SYSTOLIC BLOOD PRESSURE: 96 MMHG

## 2018-05-10 DIAGNOSIS — Z79.899 ENCOUNTER FOR LONG-TERM CURRENT USE OF HIGH RISK MEDICATION: ICD-10-CM

## 2018-05-10 DIAGNOSIS — E03.9 ADULT HYPOTHYROIDISM: ICD-10-CM

## 2018-05-10 DIAGNOSIS — R42 VERTIGO: Primary | ICD-10-CM

## 2018-05-10 DIAGNOSIS — G43.719 INTRACTABLE CHRONIC MIGRAINE WITHOUT AURA AND WITHOUT STATUS MIGRAINOSUS: ICD-10-CM

## 2018-05-10 DIAGNOSIS — J30.1 SEASONAL ALLERGIC RHINITIS DUE TO POLLEN: ICD-10-CM

## 2018-05-10 PROCEDURE — 99214 OFFICE O/P EST MOD 30 MIN: CPT | Performed by: NURSE PRACTITIONER

## 2018-05-10 RX ORDER — ELETRIPTAN HYDROBROMIDE 40 MG/1
40 TABLET, FILM COATED ORAL ONCE AS NEEDED
COMMUNITY
Start: 2017-05-11 | End: 2019-06-14 | Stop reason: HOSPADM

## 2018-05-10 RX ORDER — BUSPIRONE HYDROCHLORIDE 10 MG/1
10 TABLET ORAL 3 TIMES DAILY
COMMUNITY
End: 2019-07-12

## 2018-05-10 RX ORDER — MECLIZINE HCL 12.5 MG/1
12.5 TABLET ORAL EVERY 12 HOURS PRN
Qty: 14 TABLET | Refills: 0 | Status: SHIPPED | OUTPATIENT
Start: 2018-05-10 | End: 2018-09-04 | Stop reason: ALTCHOICE

## 2018-05-10 NOTE — PATIENT INSTRUCTIONS
Benign Paroxysmal Positional Vertigo   AMBULATORY CARE:   Benign paroxysmal positional vertigo (BPPV)  is an inner ear condition that causes you to suddenly feel dizzy  Benign means it is not serious or life-threatening  BPPV is caused by a problem with the nerves and structure of your inner ear  BPPV happens when small pieces of calcium break loose and lump together in one of your inner ear canals  Common symptoms include the following: You may feel that you or the room is moving or spinning  Turning your head, rolling over in bed, getting up or lying down may lead to sudden vertigo  You may also have any of the following symptoms:  · Nystagmus (quick shaky eye movement that you cannot control)    · Nausea    · Poor balance and feeling unsteady when you walk  Seek care immediately if:   · You fall during a BPPV episode and are injured  · You have a severe headache that does not go away  · You have new changes in your vision or feel weak or confused  · You have problems hearing, or you have ringing or buzzing in your ears  Contact your healthcare provider if:   · Your BPPV symptoms do not go away or they return  · You have problems with your balance, or you are falling often  · You have new or increased nausea or vomiting with vertigo  · You feel anxious or depressed and do not want to leave your home  · You have questions or concerns about your condition or care  Management of BPPV:   · Your healthcare provider will teach you how to move your head and body to prevent symptoms  For example, he or she may teach you certain ways to move your head or body  These movements usually help relieve your symptoms and keep the dizziness from returning  The exercises help move the calcium pieces to a different part of your ear  Do the movements only as directed  · Vestibular and balance rehabilitation therapy (VBRT)  is used to teach you exercises to improve your balance and strength   VBRT may help decrease your dizziness and prevent injuries if you are at risk for falls  · Medicines  may be recommended or prescribed to treat dizziness or nausea  Prevent your symptoms:   · Try to avoid sudden head movements  Stand up and lie down slowly  · Raise and support your head when you lie down  Place pillows under your upper back and head or rest in a recliner  · Change your position often when you are lying down  Try not to lie with your head on the same side for long periods of time  Roll over slowly  · Wear protective gear  when you ride a bike or play sports  A helmet helps protect your head from injury  Follow up with your healthcare provider as directed: You may need to return in 1 month to check the progress of your treatment  Write down your questions so you remember to ask them during your visits  © 2017 [employer] Information is for End User's use only and may not be sold, redistributed or otherwise used for commercial purposes  All illustrations and images included in CareNotes® are the copyrighted property of [employer]  or [employer]  The above information is an  only  It is not intended as medical advice for individual conditions or treatments  Talk to your doctor, nurse or pharmacist before following any medical regimen to see if it is safe and effective for you

## 2018-05-10 NOTE — LETTER
May 10, 2018     Patient: Enoch Moctezuma   YOB: 1975   Date of Visit: 5/10/2018       To Whom it May Concern:    Enoch Moctezuma is under my professional care  She was seen in my office on 5/10/2018  She may return to work on 5/14/18  Excused for vertigo/dizziness  Should not drive during active sxs       If you have any questions or concerns, please don't hesitate to call           Sincerely,          DARIAN Atkins        CC: No Recipients

## 2018-05-10 NOTE — PROGRESS NOTES
Assessment/Plan:    Problem List Items Addressed This Visit     Adult hypothyroidism    Relevant Orders    Ambulatory referral to Endocrinology    Allergic rhinitis    Migraine, chronic, without aura    Relevant Medications    eletriptan (RELPAX) 40 MG tablet    Other Relevant Orders    Ambulatory referral to Neurology      Other Visit Diagnoses     Vertigo    -  Primary    Relevant Medications    meclizine (ANTIVERT) 12 5 MG tablet    Other Relevant Orders    Ambulatory referral to Neurology    Encounter for long-term current use of high risk medication            Hold buspar x 2 days, then resume Saturday  Monitor for vertigo following dose to ascertain if this is the culprit  If so, notify Dr Sean Whipple position change  Hydrate  Avoid caffeine, high salt foods  Call Monday with update  Patient Instructions     Benign Paroxysmal Positional Vertigo   AMBULATORY CARE:   Benign paroxysmal positional vertigo (BPPV)  is an inner ear condition that causes you to suddenly feel dizzy  Benign means it is not serious or life-threatening  BPPV is caused by a problem with the nerves and structure of your inner ear  BPPV happens when small pieces of calcium break loose and lump together in one of your inner ear canals  Common symptoms include the following: You may feel that you or the room is moving or spinning  Turning your head, rolling over in bed, getting up or lying down may lead to sudden vertigo  You may also have any of the following symptoms:  · Nystagmus (quick shaky eye movement that you cannot control)    · Nausea    · Poor balance and feeling unsteady when you walk  Seek care immediately if:   · You fall during a BPPV episode and are injured  · You have a severe headache that does not go away  · You have new changes in your vision or feel weak or confused  · You have problems hearing, or you have ringing or buzzing in your ears    Contact your healthcare provider if:   · Your BPPV symptoms do not go away or they return  · You have problems with your balance, or you are falling often  · You have new or increased nausea or vomiting with vertigo  · You feel anxious or depressed and do not want to leave your home  · You have questions or concerns about your condition or care  Management of BPPV:   · Your healthcare provider will teach you how to move your head and body to prevent symptoms  For example, he or she may teach you certain ways to move your head or body  These movements usually help relieve your symptoms and keep the dizziness from returning  The exercises help move the calcium pieces to a different part of your ear  Do the movements only as directed  · Vestibular and balance rehabilitation therapy (VBRT)  is used to teach you exercises to improve your balance and strength  VBRT may help decrease your dizziness and prevent injuries if you are at risk for falls  · Medicines  may be recommended or prescribed to treat dizziness or nausea  Prevent your symptoms:   · Try to avoid sudden head movements  Stand up and lie down slowly  · Raise and support your head when you lie down  Place pillows under your upper back and head or rest in a recliner  · Change your position often when you are lying down  Try not to lie with your head on the same side for long periods of time  Roll over slowly  · Wear protective gear  when you ride a bike or play sports  A helmet helps protect your head from injury  Follow up with your healthcare provider as directed: You may need to return in 1 month to check the progress of your treatment  Write down your questions so you remember to ask them during your visits  © 2017 [employer] Information is for End User's use only and may not be sold, redistributed or otherwise used for commercial purposes   All illustrations and images included in CareNotes® are the copyrighted property of [employer]  or [employer]  The above information is an  only  It is not intended as medical advice for individual conditions or treatments  Talk to your doctor, nurse or pharmacist before following any medical regimen to see if it is safe and effective for you  Return in about 1 week (around 5/17/2018)  Subjective:      Patient ID: Vijaya Randolph is a 43 y o  female  Chief Complaint   Patient presents with    Dizziness     pt woke up this way this morning,  feeling unbalanced       Started Busprar last night per psych Dr Sarah Villafana      Dizziness   This is a new problem  The current episode started today  The problem occurs intermittently  The problem has been unchanged  Associated symptoms include headaches and vertigo  Pertinent negatives include no abdominal pain, anorexia, arthralgias, change in bowel habit, chest pain, chills, congestion, coughing, diaphoresis, fever, nausea, numbness, rash, sore throat, swollen glands, urinary symptoms, visual change, vomiting or weakness  Exacerbated by: sudden position change  She has tried nothing for the symptoms  The following portions of the patient's history were reviewed and updated as appropriate: allergies, current medications, past family history, past medical history, past social history, past surgical history and problem list     Review of Systems   Constitutional: Negative for chills, diaphoresis and fever  HENT: Negative for congestion and sore throat  Respiratory: Negative for cough  Cardiovascular: Negative for chest pain  Gastrointestinal: Negative for abdominal pain, anorexia, change in bowel habit, nausea and vomiting  Musculoskeletal: Negative for arthralgias  Skin: Negative for rash  Neurological: Positive for dizziness, vertigo and headaches  Negative for weakness and numbness           Current Outpatient Prescriptions   Medication Sig Dispense Refill    albuterol (2 5 mg/3 mL) 0 083 % nebulizer solution Take 2 5 mg by nebulization every 6 (six) hours as needed for wheezing      amitriptyline (ELAVIL) 50 mg tablet Take 75 mg by mouth daily at bedtime        buPROPion (ZYBAN) 150 MG 12 hr tablet Take 300 mg by mouth daily        busPIRone (BUSPAR) 10 mg tablet Take 10 mg by mouth 3 (three) times a day      calcitriol (ROCALTROL) 0 5 MCG capsule Take 0 5 mcg by mouth 2 (two) times a day        Calcium Carb-Cholecalciferol (CALCIUM 600 + D PO) Take by mouth 2 (two) times a day Takes 3 tabs each dose=1800mg      cetirizine (ZyrTEC) 10 mg tablet TAKE ONE TABLET BY MOUTH EVERY DAY AS NEEDED 30 tablet 6    clonazePAM (KlonoPIN) 1 mg tablet Take 2 mg by mouth daily at bedtime        Cyanocobalamin (VITAMIN B 12 PO) Take 1,000 mcg by mouth daily      cyclobenzaprine (FLEXERIL) 5 mg tablet Take 1 tablet (5 mg total) by mouth daily at bedtime as needed for muscle spasms 30 tablet 3    desvenlafaxine succinate (PRISTIQ) 50 mg 24 hr tablet Take 50 mg by mouth daily      eletriptan (RELPAX) 40 MG tablet Take by mouth      folic acid (FOLVITE) 1 mg tablet TAKE ONE TABLET BY MOUTH EVERY DAY 30 tablet 6    gabapentin (NEURONTIN) 300 mg capsule TAKE ONE CAPSULE BY MOUTH TWICE A DAY AND 2 CAPSULES BY MOUTH IN THE EVENING EACH DAY   120 capsule 6    gabapentin (NEURONTIN) 600 MG tablet Take 300 mg by mouth daily at bedtime      levothyroxine 175 mcg tablet Take 175 mcg by mouth Five days a week, mon- fri       magnesium oxide (MAG-OX) 400 mg Take 400 mg by mouth 3 (three) times a day      Multiple Vitamin (MULTIVITAMIN) tablet Take 1 tablet by mouth daily      omeprazole (PriLOSEC) 20 mg delayed release capsule Take 40 mg by mouth daily        ondansetron (ZOFRAN) 4 mg tablet Take 1 tablet by mouth every 8 (eight) hours as needed for nausea or vomiting for up to 6 doses 6 tablet 0    SM ASPIRIN ADULT LOW STRENGTH 81 MG EC tablet TAKE ONE TABLET BY MOUTH DAILY AS DIRECTED 30 tablet 3    sucralfate (CARAFATE) 1 g tablet Take 1 g by mouth as needed        topiramate (TOPAMAX) 50 MG tablet Take 50 mg by mouth 2 (two) times a day      traMADol-acetaminophen (ULTRACET) 37 5-325 mg per tablet Take 1 tablet by mouth every 12 (twelve) hours as needed for moderate pain 60 tablet 2    Zolpidem Tartrate (AMBIEN PO) Take 10 mg by mouth daily at bedtime      Cholecalciferol (VITAMIN D PO) Take 50,000 Units by mouth once a week      cyanocobalamin (VITAMIN B-12) 1,000 mcg tablet TAKE ONE TABLET BY MOUTH EVERY DAY 30 tablet 6    meclizine (ANTIVERT) 12 5 MG tablet Take 1 tablet (12 5 mg total) by mouth every 12 (twelve) hours as needed for dizziness 14 tablet 0    montelukast (SINGULAIR) 10 mg tablet TAKE ONE TABLET BY MOUTH EVERY DAY AS DIRECTED 30 tablet 6    ondansetron (ZOFRAN-ODT) 4 mg disintegrating tablet Take 1 tablet (4 mg total) by mouth every 8 (eight) hours 30 tablet 2     No current facility-administered medications for this visit  Objective:    BP 96/58 (BP Location: Left arm, Patient Position: Sitting, Cuff Size: Adult)   Pulse 76   Temp 97 8 °F (36 6 °C) (Temporal)   Ht 5' 5" (1 651 m)   Wt 82 6 kg (182 lb)   LMP 09/06/2017   BMI 30 29 kg/m²        Physical Exam   Constitutional: She is oriented to person, place, and time  She appears well-developed and well-nourished  No distress  HENT:   Head: Normocephalic and atraumatic  Mouth/Throat: Oropharynx is clear and moist    Eyes: Conjunctivae are normal  Pupils are equal, round, and reactive to light  Right eye exhibits nystagmus  Left eye exhibits nystagmus  Cardiovascular: Normal rate, regular rhythm, normal heart sounds and intact distal pulses  No murmur heard  Pulmonary/Chest: Effort normal and breath sounds normal    Neurological: She is alert and oriented to person, place, and time  No cranial nerve deficit  Gait normal    + Romberg   Skin: Skin is warm and dry  No erythema  Psychiatric: She has a normal mood and affect     Nursing note and vitals reviewed               Bertha Corbin, [address]

## 2018-05-11 DIAGNOSIS — R05.8 ALLERGIC COUGH: Primary | ICD-10-CM

## 2018-05-11 RX ORDER — MONTELUKAST SODIUM 10 MG/1
TABLET ORAL
Qty: 30 TABLET | Refills: 6 | Status: SHIPPED | OUTPATIENT
Start: 2018-05-11 | End: 2018-11-16 | Stop reason: SDUPTHER

## 2018-05-14 ENCOUNTER — APPOINTMENT (OUTPATIENT)
Dept: PHYSICAL THERAPY | Facility: CLINIC | Age: 43
End: 2018-05-14
Payer: COMMERCIAL

## 2018-05-15 ENCOUNTER — OFFICE VISIT (OUTPATIENT)
Dept: NEUROLOGY | Facility: CLINIC | Age: 43
End: 2018-05-15
Payer: COMMERCIAL

## 2018-05-15 VITALS
WEIGHT: 180 LBS | HEART RATE: 99 BPM | BODY MASS INDEX: 29.99 KG/M2 | HEIGHT: 65 IN | SYSTOLIC BLOOD PRESSURE: 104 MMHG | DIASTOLIC BLOOD PRESSURE: 82 MMHG

## 2018-05-15 DIAGNOSIS — G44.309 POST-CONCUSSION HEADACHE: ICD-10-CM

## 2018-05-15 DIAGNOSIS — G43.709 CHRONIC MIGRAINE WITHOUT AURA WITHOUT STATUS MIGRAINOSUS, NOT INTRACTABLE: Primary | ICD-10-CM

## 2018-05-15 PROCEDURE — 99214 OFFICE O/P EST MOD 30 MIN: CPT | Performed by: PSYCHIATRY & NEUROLOGY

## 2018-05-15 RX ORDER — BUPROPION HYDROCHLORIDE 300 MG/1
300 TABLET ORAL EVERY MORNING
COMMUNITY

## 2018-05-15 NOTE — PROGRESS NOTES
Return NeuroOutpatient Note        Emma Perera  [id number]  26 y o   1975       Migraine        History obtained from:  Patient     HPI/Subjective:    Ms  Kinza Egan is a 44 yo F with h/o migraines returns as follow up for post concussion headache  Per my previous history, On Sept 30th 2016, She was cleaning and accidentally hit her head on brick counter  She did not loose consciousness  She had nausea and visual disturbance, dizziness afterwards  These symptoms resolved after 1 week  She had started being seen here for headaches  In last 3 months, she's had one migraine only  Relpax helped  Prior to topamax, they were several a week  She may get mild intensity headaches but they are manageable and she thinks they may be from stress  She's on elavil 75mg qhs and topamax 50mg qhs  She was stressed from her last job and now that it's ended is looking for new job  Past Medical History:   Diagnosis Date    Asthma     Cancer ([facility] [address] )     thyroid    Disease of thyroid gland     Gastritis     Hypocalcemia     Hypoglycemia     Migraine     Psychiatric disorder     depression     Social History     Social History    Marital status: /Civil Union     Spouse name: N/A    Number of children: N/A    Years of education: N/A     Occupational History    Not on file       Social History Main Topics    Smoking status: Never Smoker    Smokeless tobacco: Never Used    Alcohol use Yes      Comment: occ    Drug use: No    Sexual activity: Not on file     Other Topics Concern    Not on file     Social History Narrative    No narrative on file     Family History   Problem Relation Age of Onset    Lupus Mother     Crohn's disease Mother     Diabetes Father      Allergies   Allergen Reactions    Demerol [Meperidine] Other (See Comments) and Hallucinations     hallucinations    Medical Tape     Morphine      Morphine resistant    Compazine [Prochlorperazine Edisylate] Anxiety    Other Rash Adhesive tape    Prochlorperazine Anxiety     Current Outpatient Prescriptions on File Prior to Visit   Medication Sig Dispense Refill    albuterol (2 5 mg/3 mL) 0 083 % nebulizer solution Take 2 5 mg by nebulization every 6 (six) hours as needed for wheezing      amitriptyline (ELAVIL) 75 mg tablet Take 75 mg by mouth daily at bedtime        busPIRone (BUSPAR) 10 mg tablet Take 10 mg by mouth 3 (three) times a day      calcitriol (ROCALTROL) 0 5 MCG capsule Take 0 5 mcg by mouth 2 (two) times a day        Calcium Carb-Cholecalciferol (CALCIUM 600 + D PO) 3 tablets 4 times Daily       cetirizine (ZyrTEC) 10 mg tablet TAKE ONE TABLET BY MOUTH EVERY DAY AS NEEDED (Patient taking differently: TAKE ONE TABLET BY MOUTH EVERY DAY) 30 tablet 6    Cholecalciferol (VITAMIN D PO) Take 50,000 Units by mouth once a week      clonazePAM (KlonoPIN) 1 mg tablet Take 1 mg by mouth daily at bedtime        cyanocobalamin (VITAMIN B-12) 1,000 mcg tablet TAKE ONE TABLET BY MOUTH EVERY DAY 30 tablet 6    cyclobenzaprine (FLEXERIL) 5 mg tablet Take 1 tablet (5 mg total) by mouth daily at bedtime as needed for muscle spasms (Patient taking differently: Take 5 mg by mouth daily at bedtime  ) 30 tablet 3    desvenlafaxine succinate (PRISTIQ) 50 mg 24 hr tablet Take 50 mg by mouth daily      eletriptan (RELPAX) 40 MG tablet Take by mouth      folic acid (FOLVITE) 1 mg tablet TAKE ONE TABLET BY MOUTH EVERY DAY 30 tablet 6    gabapentin (NEURONTIN) 300 mg capsule TAKE ONE CAPSULE BY MOUTH TWICE A DAY AND 2 CAPSULES BY MOUTH IN THE EVENING EACH DAY   120 capsule 6    levothyroxine 175 mcg tablet Take 175 mcg by mouth Five days a week, mon- fri       magnesium oxide (MAG-OX) 400 mg Take 400 mg by mouth 3 (three) times a day      meclizine (ANTIVERT) 12 5 MG tablet Take 1 tablet (12 5 mg total) by mouth every 12 (twelve) hours as needed for dizziness 14 tablet 0    montelukast (SINGULAIR) 10 mg tablet TAKE ONE TABLET BY MOUTH EVERY DAY AS DIRECTED 30 tablet 6    Multiple Vitamin (MULTIVITAMIN) tablet Take 1 tablet by mouth daily      omeprazole (PriLOSEC) 40 MG capsule Take 40 mg by mouth daily        ondansetron (ZOFRAN-ODT) 4 mg disintegrating tablet Take 1 tablet (4 mg total) by mouth every 8 (eight) hours 30 tablet 2    SM ASPIRIN ADULT LOW STRENGTH 81 MG EC tablet TAKE ONE TABLET BY MOUTH DAILY AS DIRECTED 30 tablet 3    sucralfate (CARAFATE) 1 g/10 mL suspension Take 1 g by mouth as needed        topiramate (TOPAMAX) 50 MG tablet Take 50 mg by mouth daily        traMADol-acetaminophen (ULTRACET) 37 5-325 mg per tablet Take 1 tablet by mouth every 12 (twelve) hours as needed for moderate pain (Patient taking differently: Take 1 tablet by mouth every 12 (twelve) hours  ) 60 tablet 2    Zolpidem Tartrate (AMBIEN PO) Take 10 mg by mouth daily at bedtime      buPROPion (ZYBAN) 150 MG 12 hr tablet Take 300 mg by mouth daily        Cyanocobalamin (VITAMIN B 12 PO) Take 1,000 mcg by mouth daily      gabapentin (NEURONTIN) 600 MG tablet Take 300 mg by mouth daily at bedtime      ondansetron (ZOFRAN) 4 mg tablet Take 1 tablet by mouth every 8 (eight) hours as needed for nausea or vomiting for up to 6 doses 6 tablet 0     No current facility-administered medications on file prior to visit  Review of Systems   Refer to positive review of systems in HPI     Constitutional- No fever  Eyes- No visual change  ENT- Hearing normal  CV- No chest pain  Resp- No Shortness of breath  GI- No diarrhea  - Bladder normal  MS- No Arthritis   Skin- No rash  Psych- No depression  Endo- No DM  Heme- No nodes    Vitals:    05/15/18 1351   BP: 104/82   BP Location: Left arm   Patient Position: Sitting   Pulse: 99   Weight: 81 6 kg (180 lb)   Height: 5' 5" (1 651 m)       PHYSICAL EXAM:  Appearance: No Acute Distress  Ophthalmoscopic: Disc Flat, Normal fundus  Mental status:  Orientation: Awake, Alert, and Orientedx3  Memory: Registation 3/3 Recall 3/3  Attention: normal  Knowledge: good  Language: No aphasia  Speech: No dysarthria  Cranial Nerves:  2 No Visual Defect on Confrontation, Pupils round, equal, reactive to light  3,4,6 Extraocular Movements Intact, no nystagmus  5 Facial Sensation Intact  7 No facial asymmetry  8 Intact hearing  9,10 Palate symmetric, normal gag  11 Good shoulder shrug  12 Tongue Midline  Gait: Stable  Coordination: No ataxia with finger to nose testing, and heel to shin  Sensory: Intact, Symmetric to pinprick, light touch, vibration, and joint position  Muscle Tone: Normal              Muscle exam:  Arm Right Left Leg Right Left   Deltoid 5/5 5/5 Iliopsoas 5/5 5/5   Biceps 5/5 5/5 Quads 5/5 5/5   Triceps 5/5 5/5 Hamstrings 5/5 5/5   Wrist Extension 5/5 5/5 Ankle Dorsi Flexion 5/5 5/5   Wrist Flexion 5/5 5/5 Ankle Plantar Flexion 5/5 5/5   Interossei 5/5 5/5 Ankle Eversion 5/5 5/5   APB 5/5 5/5 Ankle Inversion 5/5 5/5       Reflexes   RJ BJ TJ KJ AJ Plantars Leach's   Right 2+ 2+ 2+ 2+ 2+ Downgoing Not present   Left 2+ 2+ 2+ 2+ 2+ Downgoing Not present     Personal review of  Labs:                    Diagnoses and all orders for this visit:          1  Chronic migraine without aura without status migrainosus, not intractable     2  Post-concussion headache           Patient is stable from migraine standpoint  She's doing well on her current regimen with elavil and topamax  renew relpax  Encouraged staying hydrated             Counseling Documentation:  The patient and/or patient's family were  counseled regarding diagnostic results  Instructions for management,risk factor reductions,prognosis of disease were discussed  Patient and family were educated regarding impressions,risks and benefits of treatment options,importance of compliance with treatment        Total time of encounter:  30 min  More than 50% of the time was used in counseling and/or coordination of care  Extent of counseling and/or coordination of care        Orly Winston MD  [facility]  [address]  [place] [address]  [phone]

## 2018-05-16 ENCOUNTER — APPOINTMENT (OUTPATIENT)
Dept: PHYSICAL THERAPY | Facility: CLINIC | Age: 43
End: 2018-05-16
Payer: COMMERCIAL

## 2018-05-16 ENCOUNTER — OFFICE VISIT (OUTPATIENT)
Dept: PHYSICAL THERAPY | Facility: CLINIC | Age: 43
End: 2018-05-16
Payer: COMMERCIAL

## 2018-05-16 DIAGNOSIS — M54.12 CERVICAL RADICULOPATHY, ACUTE: Primary | ICD-10-CM

## 2018-05-16 PROCEDURE — 97110 THERAPEUTIC EXERCISES: CPT | Performed by: PHYSICAL THERAPIST

## 2018-05-16 PROCEDURE — 97112 NEUROMUSCULAR REEDUCATION: CPT | Performed by: PHYSICAL THERAPIST

## 2018-05-16 PROCEDURE — 97140 MANUAL THERAPY 1/> REGIONS: CPT | Performed by: PHYSICAL THERAPIST

## 2018-05-16 NOTE — PROGRESS NOTES
Daily Note     Today's date: 2018  Patient name: Mark Green  : 1975  MRN: [id number]  Referring provider: DARIAN Shirley  Dx:   Encounter Diagnosis     ICD-10-CM    1  Cervical radiculopathy, acute M54 12                   Subjective: Patient reports continued neck pain      Objective: See treatment diary below      Assessment: Tolerated treatment fair  Patient demonstrated fatigue post treatment      Plan: Continue per plan of care  Precautions: Fibromyalgia  Hx Migraines, thyroid CA      Daily Treatment Diary     Manual   2 3          Manual traction   10'  10' 10'         STM - scalenes/UT seated   10'  10'  5'         PROM    B shlds   supine x 5 min                                         Visits 1 2  3 4         Exercise Diary  18         Posture Edu 15' reviewed reviewed  review         C/S Ret 5x X 10 ea seated & supine  X 10 ea seated & supine  x10           pulleys for flexion x 10              supine AA shldr flexion x 7 reps  supine AA shlr flexion x 5reps ea          Treadmill    10 min 2 2         Scapular retraction    Red tb20x         Scap retract with depression    20x         Pectoralis doorway stretch    10x         Wall Lifts    20x         Self-stretch C Flex, Bilat SB    3 x 10                                                                                                                 HEP Initiated/edu To continue with current HEP            Time 15'                Modalities

## 2018-05-17 ENCOUNTER — APPOINTMENT (OUTPATIENT)
Dept: PHYSICAL THERAPY | Facility: CLINIC | Age: 43
End: 2018-05-17
Payer: COMMERCIAL

## 2018-05-18 ENCOUNTER — OFFICE VISIT (OUTPATIENT)
Dept: PHYSICAL THERAPY | Facility: CLINIC | Age: 43
End: 2018-05-18
Payer: COMMERCIAL

## 2018-05-18 DIAGNOSIS — M54.12 CERVICAL RADICULOPATHY, ACUTE: Primary | ICD-10-CM

## 2018-05-18 PROCEDURE — 97140 MANUAL THERAPY 1/> REGIONS: CPT

## 2018-05-18 PROCEDURE — 97110 THERAPEUTIC EXERCISES: CPT

## 2018-05-18 PROCEDURE — G8985 CARRY GOAL STATUS: HCPCS

## 2018-05-18 PROCEDURE — G8984 CARRY CURRENT STATUS: HCPCS

## 2018-05-21 ENCOUNTER — APPOINTMENT (OUTPATIENT)
Dept: PHYSICAL THERAPY | Facility: CLINIC | Age: 43
End: 2018-05-21
Payer: COMMERCIAL

## 2018-05-22 ENCOUNTER — OFFICE VISIT (OUTPATIENT)
Dept: PHYSICAL THERAPY | Facility: CLINIC | Age: 43
End: 2018-05-22
Payer: COMMERCIAL

## 2018-05-22 DIAGNOSIS — M54.12 CERVICAL RADICULOPATHY, ACUTE: Primary | ICD-10-CM

## 2018-05-22 PROCEDURE — 97112 NEUROMUSCULAR REEDUCATION: CPT | Performed by: PHYSICAL THERAPIST

## 2018-05-22 PROCEDURE — 97140 MANUAL THERAPY 1/> REGIONS: CPT | Performed by: PHYSICAL THERAPIST

## 2018-05-22 PROCEDURE — 97110 THERAPEUTIC EXERCISES: CPT | Performed by: PHYSICAL THERAPIST

## 2018-05-22 NOTE — PROGRESS NOTES
Daily Note     Today's date: 2018  Patient name: Jael Benson  : 1975  MRN: [id number]  Referring provider: DARIAN Rich  Dx:   Encounter Diagnosis     ICD-10-CM    1  Cervical radiculopathy, acute M54 12                   Subjective: My neck is more painful on rainy days  Objective: See treatment diary below      Assessment: Tolerated treatment well  Patient demonstrated fatigue post treatment and exhibited good technique with therapeutic exercises      Plan: Continue per plan of care  Precautions: Fibromyalgia  Hx Migraines, thyroid CA      Daily Treatment Diary     Manual   2 3        Manual traction   10'  10' 10' -- 5'       STM - scalenes/UT seated   10'  10'  5' seated Fig 8 and arc stretch       PROM    B shlds   supine x 5 min   --                                      Visits 1 2  3 4 5        Exercise Diary  --       Posture Edu 15' reviewed reviewed  review review review       C/S Ret 5x X 10 ea seated & supine  X 10 ea seated & supine  x10 10x seated 10x         pulleys for flexion x 10    10x          supine AA shldr flexion x 7 reps  supine AA shlr flexion x 5reps ea  ----        Treadmill    10 min 2 2 NuStep x10min        Scapular retraction    Red tb20x Red tb x 20 Red TB 20x       Scap retract with depression    20x 20x 20x       Pectoralis doorway stretch    10x 10x 15x       Wall Lifts    20x hep        Self-stretch C Flex, Bilat SB    3 x 10 hep        TB IR/ER/EXT     Red x10 each        Blue PB rollout stretch     15 x 5 sec        [place] scap retract with depression      #10 20x       Hansel day 1 & 2      10x                                                           HEP Initiated/edu To continue with current HEP            Time 15'                Modalities              CP C/S (seated)     10 min

## 2018-05-23 ENCOUNTER — APPOINTMENT (OUTPATIENT)
Dept: PHYSICAL THERAPY | Facility: CLINIC | Age: 43
End: 2018-05-23
Payer: COMMERCIAL

## 2018-05-25 ENCOUNTER — OFFICE VISIT (OUTPATIENT)
Dept: PHYSICAL THERAPY | Facility: CLINIC | Age: 43
End: 2018-05-25
Payer: COMMERCIAL

## 2018-05-25 DIAGNOSIS — M54.12 CERVICAL RADICULOPATHY, ACUTE: Primary | ICD-10-CM

## 2018-05-25 PROCEDURE — 97112 NEUROMUSCULAR REEDUCATION: CPT | Performed by: PHYSICAL THERAPIST

## 2018-05-25 PROCEDURE — 97110 THERAPEUTIC EXERCISES: CPT | Performed by: PHYSICAL THERAPIST

## 2018-05-25 NOTE — PROGRESS NOTES
Daily Note     Today's date: 2018  Patient name: Reinier Han  : 1975  MRN: [id number]  Referring provider: DARIAN Chavis  Dx:   Encounter Diagnosis     ICD-10-CM    1  Cervical radiculopathy, acute M54 12                   Subjective: My neck is still painful  Objective: See treatment diary below      Assessment: Tolerated treatment well  Patient exhibited good technique with therapeutic exercises      Plan: Continue per plan of care  Precautions: Fibromyalgia  Hx Migraines, thyroid CA      Daily Treatment Diary     Manual   2 3       Manual traction   10'  10' 10' -- 5'       STM - scalenes/UT seated   10'  10'  5' seated Fig 8 and arc stretch       PROM    B shlds   supine x 5 min   --                                      Visits 1 2  3 4 5        Exercise Diary  18      Posture Edu 15' reviewed reviewed  review review review review      C/S Ret 5x X 10 ea seated & supine  X 10 ea seated & supine  x10 10x seated 10x         pulleys for flexion x 10    10x          supine AA shldr flexion x 7 reps  supine AA shlr flexion x 5reps ea  ----        Treadmill    10 min 2 2 NuStep x10min  10 min      Scapular retraction    Red tb20x Red tb x 20 Red TB 20x 20x      Scap retract with depression    20x 20x 20x 20x      Pectoralis doorway stretch    10x 10x 15x 20x      Wall Lifts    20x hep        Self-stretch C Flex, Bilat SB    3 x 10 hep        TB IR/ER/EXT     Red x10 each        Blue PB rollout stretch     15 x 5 sec        [place] scap retract with depression      #10 20x #10 20x      Hansel day 1 & 2      10x       PB back stroke       10x      PB pectoralis stretch       10x      Sit to Stand        20x                   HEP Initiated/edu To continue with current HEP            Time 15'                Modalities              CP C/S (seated)     10 min

## 2018-05-29 ENCOUNTER — APPOINTMENT (OUTPATIENT)
Dept: PHYSICAL THERAPY | Facility: CLINIC | Age: 43
End: 2018-05-29
Payer: COMMERCIAL

## 2018-05-31 ENCOUNTER — APPOINTMENT (OUTPATIENT)
Dept: PHYSICAL THERAPY | Facility: CLINIC | Age: 43
End: 2018-05-31
Payer: COMMERCIAL

## 2018-06-04 DIAGNOSIS — M54.50 CHRONIC BILATERAL LOW BACK PAIN WITHOUT SCIATICA: ICD-10-CM

## 2018-06-04 DIAGNOSIS — M79.7 FIBROMYALGIA: ICD-10-CM

## 2018-06-04 DIAGNOSIS — G89.29 CHRONIC BILATERAL LOW BACK PAIN WITHOUT SCIATICA: ICD-10-CM

## 2018-06-04 RX ORDER — CYCLOBENZAPRINE HCL 5 MG
TABLET ORAL
Qty: 30 TABLET | Refills: 3 | Status: SHIPPED | OUTPATIENT
Start: 2018-06-04 | End: 2018-09-23 | Stop reason: SDUPTHER

## 2018-06-14 ENCOUNTER — APPOINTMENT (OUTPATIENT)
Dept: RADIOLOGY | Facility: CLINIC | Age: 43
End: 2018-06-14
Payer: COMMERCIAL

## 2018-06-14 ENCOUNTER — OFFICE VISIT (OUTPATIENT)
Dept: FAMILY MEDICINE CLINIC | Facility: CLINIC | Age: 43
End: 2018-06-14
Payer: COMMERCIAL

## 2018-06-14 ENCOUNTER — TRANSCRIBE ORDERS (OUTPATIENT)
Dept: RADIOLOGY | Facility: CLINIC | Age: 43
End: 2018-06-14

## 2018-06-14 ENCOUNTER — TELEPHONE (OUTPATIENT)
Dept: FAMILY MEDICINE CLINIC | Facility: CLINIC | Age: 43
End: 2018-06-14

## 2018-06-14 VITALS
DIASTOLIC BLOOD PRESSURE: 60 MMHG | SYSTOLIC BLOOD PRESSURE: 100 MMHG | BODY MASS INDEX: 30.99 KG/M2 | TEMPERATURE: 97.7 F | HEART RATE: 84 BPM | WEIGHT: 186 LBS | HEIGHT: 65 IN

## 2018-06-14 DIAGNOSIS — M79.672 LEFT FOOT PAIN: Primary | ICD-10-CM

## 2018-06-14 DIAGNOSIS — M79.672 LEFT FOOT PAIN: ICD-10-CM

## 2018-06-14 PROCEDURE — 73630 X-RAY EXAM OF FOOT: CPT

## 2018-06-14 PROCEDURE — 99213 OFFICE O/P EST LOW 20 MIN: CPT | Performed by: NURSE PRACTITIONER

## 2018-06-14 NOTE — PATIENT INSTRUCTIONS
Metatarsalgia   AMBULATORY CARE:   Metatarsalgia  is pain in the ball of your foot, near your second, third, and fourth toes  Common signs and symptoms of metatarsalgia:  Symptoms usually develop over time, but you may have sudden pain from an injury  You may have any of the following:  · Pain at the ball of your foot or near your toes that gets worse when you walk or stand, especially on hard surfaces    · Pain during exercises such as running    · Sharp or shooting pain in your toes that may get worse when you flex your toes    · Tingling or numbness in your toes    · Feeling like you are walking over rocks, or that you have a bruise    · A change in the way you walk because you try to avoid putting pressure on the ball of your foot  Contact your healthcare provider if:   · You develop knee, back, or hip pain  · You have more pain or redness in the foot  · You have questions or concerns about your condition or care  Treatment:  The cause of your metatarsalgia will be treated, if possible  You may also need any of the following:  · NSAIDs , such as ibuprofen, help decrease swelling, pain, and fever  This medicine is available with or without a doctor's order  NSAIDs can cause stomach bleeding or kidney problems in certain people  If you take blood thinner medicine, always ask if NSAIDs are safe for you  Always read the medicine label and follow directions  Do not give these medicines to children under 10months of age without direction from your child's healthcare provider  · Ultrasound  may be used to relieve your pain  Sound waves from the ultrasound can help send heat deeper into your tissues  · A steroid injection  may help decrease inflammation  · Surgery  may be needed if other treatments do not work  Surgery is used to align the bones near your toes  You may also need surgery to fix a problem such as hammertoe  Manage or prevent metatarsalgia:   · Rest your foot    If you play sports, you may not be able to do weight-bearing exercises  Examples include swimming and bike riding  Ask your healthcare provider which exercises are safe for you  · Apply ice as directed  Ice helps reduce pain and swelling  Use an ice pack, or put crushed ice in a plastic bag  Cover the pack or bag with a towel before you apply it to your foot  Apply ice for 15 to 20 minutes every hour, or as directed  · Use a cane or crutch if directed  These devices may help take pressure off your foot while it heals  · Wear proper shoes  Do not wear shoes that are narrow or tight  You may need to wear shoes that are wider than you usually wear  Choose shoes that do not have a raised heel  Shock-absorbing shoes can help prevent injury  These shoes will have extra support under your feet and toes  You can also add shoe cushions inside your shoes or to the bottoms of your feet, near your toes  The cushions may provide more support and make walking or standing more comfortable  Arch supports may help take pressure off your toes  · Reach or maintain a healthy weight  Extra weight can put pressure on your feet  Talk to your healthcare provider about a healthy weight for you  Your provider can help you create a safe weight loss plan if you are overweight  · Go to physical therapy if directed  A physical therapist can help improve your strength and range of motion  The therapist can also help you improve the way you walk to prevent metatarsalgia from happening again  Your therapist can also teach you exercises to help relieve your pain  Follow up with your healthcare provider as directed:  Write down your questions so you remember to ask them during your visits  © 2017 [facility] Information is for End User's use only and may not be sold, redistributed or otherwise used for commercial purposes   All illustrations and images included in CareNotes® are the copyrighted property of [employer] M [employer] Inc  or [employer]  The above information is an  only  It is not intended as medical advice for individual conditions or treatments  Talk to your doctor, nurse or pharmacist before following any medical regimen to see if it is safe and effective for you

## 2018-06-14 NOTE — TELEPHONE ENCOUNTER
----- Message from Vannessa Stone [facility] sent at 6/14/2018  2:54 PM EDT -----  Bone spur present   Follow up with podiatrist if pain persists for >1 week

## 2018-06-14 NOTE — PROGRESS NOTES
Mann     Yoselinginna Main is a 37 y o  female who presents with left foot pain  Onset of the symptoms was yesterday  Precipitating event: none known  Current symptoms include: ability to bear weight, but with some pain  Aggravating factors: any weight bearing and going up and down stairs  Symptoms have progressed to a point and plateaued  Patient has had no prior foot problems  Evaluation to date: none  Treatment to date: none  The following portions of the patient's history were reviewed and updated as appropriate: allergies, current medications, past family history, past medical history, past social history, past surgical history and problem list     Review of Systems:  Pertinent items are noted in HPI       Objective     /60 (BP Location: Left arm, Patient Position: Sitting, Cuff Size: Adult)   Pulse 84   Temp 97 7 °F (36 5 °C) (Temporal)   Ht 5' 5" (1 651 m)   Wt 84 4 kg (186 lb)   LMP 09/06/2017   BMI 30 95 kg/m²   Right foot:  normal exam, no swelling, tenderness, instability; ligaments intact, full range of motion of all ankle/foot joints   Left foot:  soft tissue swelling noted over the forefoot (lateral aspect) otherwise normal exam and       Imaging:  X-ray of the left foot: ordered, but results not yet available  Assessment/Plan     Non-specific foot pain  Natural history and expected course discussed  Questions answered  Educational material distributed  Rest, ice, compression, and elevation (RICE) therapy  OTC analgesics as needed  Follow up in 1 week   podiatry eval if not improved in 1 week

## 2018-06-27 DIAGNOSIS — K21.9 GASTROESOPHAGEAL REFLUX DISEASE WITHOUT ESOPHAGITIS: Primary | ICD-10-CM

## 2018-06-27 RX ORDER — OMEPRAZOLE 40 MG/1
CAPSULE, DELAYED RELEASE ORAL
Qty: 30 CAPSULE | Refills: 6 | Status: SHIPPED | OUTPATIENT
Start: 2018-06-27 | End: 2019-01-04 | Stop reason: SDUPTHER

## 2018-07-02 DIAGNOSIS — G43.909 MIGRAINE WITHOUT STATUS MIGRAINOSUS, NOT INTRACTABLE, UNSPECIFIED MIGRAINE TYPE: Primary | ICD-10-CM

## 2018-07-02 RX ORDER — AMITRIPTYLINE HYDROCHLORIDE 75 MG/1
75 TABLET, FILM COATED ORAL
Qty: 30 TABLET | Refills: 3 | Status: SHIPPED | OUTPATIENT
Start: 2018-07-02 | End: 2018-10-18 | Stop reason: SDUPTHER

## 2018-07-03 DIAGNOSIS — M79.7 FIBROMYALGIA: ICD-10-CM

## 2018-07-03 DIAGNOSIS — M54.50 CHRONIC BILATERAL LOW BACK PAIN WITHOUT SCIATICA: ICD-10-CM

## 2018-07-03 DIAGNOSIS — G89.29 CHRONIC BILATERAL LOW BACK PAIN WITHOUT SCIATICA: ICD-10-CM

## 2018-07-17 DIAGNOSIS — G89.29 CHRONIC BILATERAL LOW BACK PAIN WITHOUT SCIATICA: ICD-10-CM

## 2018-07-17 DIAGNOSIS — M54.50 CHRONIC BILATERAL LOW BACK PAIN WITHOUT SCIATICA: ICD-10-CM

## 2018-07-17 DIAGNOSIS — M79.7 FIBROMYALGIA: ICD-10-CM

## 2018-07-24 DIAGNOSIS — Z78.9 DEEP VEIN THROMBOSIS (DVT) PROPHYLAXIS PRESCRIBED AT DISCHARGE: ICD-10-CM

## 2018-07-24 RX ORDER — ASPIRIN 81 MG/1
TABLET, DELAYED RELEASE ORAL
Qty: 30 TABLET | Refills: 3 | Status: SHIPPED | OUTPATIENT
Start: 2018-07-24 | End: 2018-11-10 | Stop reason: SDUPTHER

## 2018-07-28 DIAGNOSIS — G89.29 CHRONIC BILATERAL LOW BACK PAIN WITHOUT SCIATICA: ICD-10-CM

## 2018-07-28 DIAGNOSIS — M54.50 CHRONIC BILATERAL LOW BACK PAIN WITHOUT SCIATICA: ICD-10-CM

## 2018-07-28 DIAGNOSIS — M79.7 FIBROMYALGIA: ICD-10-CM

## 2018-07-29 DIAGNOSIS — G43.109 MIGRAINE WITH AURA AND WITHOUT STATUS MIGRAINOSUS, NOT INTRACTABLE: Primary | ICD-10-CM

## 2018-07-30 RX ORDER — TOPIRAMATE 50 MG/1
TABLET, FILM COATED ORAL
Qty: 30 TABLET | Refills: 5 | Status: SHIPPED | OUTPATIENT
Start: 2018-07-30 | End: 2019-01-07 | Stop reason: SDUPTHER

## 2018-08-07 ENCOUNTER — OFFICE VISIT (OUTPATIENT)
Dept: FAMILY MEDICINE CLINIC | Facility: CLINIC | Age: 43
End: 2018-08-07
Payer: COMMERCIAL

## 2018-08-07 VITALS
TEMPERATURE: 98 F | DIASTOLIC BLOOD PRESSURE: 60 MMHG | BODY MASS INDEX: 30.49 KG/M2 | WEIGHT: 183 LBS | HEART RATE: 92 BPM | HEIGHT: 65 IN | SYSTOLIC BLOOD PRESSURE: 100 MMHG | RESPIRATION RATE: 16 BRPM

## 2018-08-07 DIAGNOSIS — K52.9 GASTROENTERITIS: Primary | ICD-10-CM

## 2018-08-07 PROCEDURE — 99213 OFFICE O/P EST LOW 20 MIN: CPT | Performed by: FAMILY MEDICINE

## 2018-08-07 NOTE — PATIENT INSTRUCTIONS
Acute Nausea and Vomiting   AMBULATORY CARE:   Acute nausea and vomiting  starts suddenly, gets worse quickly, and lasts a short time  Common causes include pregnancy, alcohol, infection, and medicines  A head injury, heart attack, or inner ear imbalance can also cause acute nausea and vomiting  Seek care immediately if:   · You see blood in your vomit or your bowel movements  · You have sudden, severe pain in your chest and upper abdomen after hard vomiting or retching  · You have swelling in your neck and chest      · You are dizzy, cold, and thirsty and your eyes and mouth are dry  · You are urinating very little or not at all  · You have muscle weakness, leg cramps, and trouble breathing  · Your heart is beating much faster than normal      · You continue to vomit for more than 48 hours  Contact your healthcare provider if:   · You have frequent dry heaves (vomiting but nothing comes out)  · Your nausea and vomiting does not get better or go away after you use medicine  · You have questions or concerns about your condition or treatment  Treatment for acute nausea and vomiting  may include medicines to calm your stomach and stop the vomiting  You may need IV fluids if you are dehydrated  Prevent or manage acute nausea and vomiting:   · Do not drink alcohol  Alcohol may upset or irritate your stomach  Too much alcohol can also cause acute nausea and vomiting  · Control stress  Headaches due to stress may cause nausea and vomiting  Find ways to relax and manage your stress  Get more rest and sleep  · Drink more liquids as directed  Vomiting can lead to dehydration  It is important to drink more liquids to help replace lost body fluids  Ask your healthcare provider how much liquid to drink each day and which liquids are best for you  Your provider may recommend that you drink an oral rehydration solution (ORS)   ORS contains water, salts, and sugar that are needed to replace the lost body fluids  Ask what kind of ORS to use, how much to drink, and where to get it  · Eat smaller meals, more often  Eat small amounts of food every 2 to 3 hours, even if you are not hungry  Food in your stomach may decrease your nausea  · Talk to your healthcare provider before you take over-the-counter (OTC) medicines  These medicines can cause serious problems if you use certain other medicines, or you have a medical condition  You may have problems if you use too much or use them for longer than the label says  Follow directions on the label carefully  Follow up with your healthcare provider as directed:  Write down your questions so you remember to ask them during your visits  © 2017 [address] [employer] Information is for End User's use only and may not be sold, redistributed or otherwise used for commercial purposes  All illustrations and images included in CareNotes® are the copyrighted property of [employer]  or [employer]  The above information is an  only  It is not intended as medical advice for individual conditions or treatments  Talk to your doctor, nurse or pharmacist before following any medical regimen to see if it is safe and effective for you

## 2018-08-07 NOTE — PROGRESS NOTES
Chief Complaint   Patient presents with    Abdominal Cramping    Diarrhea        Patient ID: Kelsey Calderon is a 37 y o  female  HPI  Pt is seeing for N/V/diarrhea x 2+ days, able to eat some toasts and drinking Gatorade  -  No blood in BM -  Was eating out prior to that, no direct sick contacts  -  Tried Zofran with no help     The following portions of the patient's history were reviewed and updated as appropriate: allergies, current medications, past family history, past medical history, past social history, past surgical history and problem list     Review of Systems   Constitutional: Negative  Respiratory: Negative  Cardiovascular: Negative  Gastrointestinal: Negative for abdominal pain         Current Outpatient Prescriptions   Medication Sig Dispense Refill    albuterol (2 5 mg/3 mL) 0 083 % nebulizer solution Take 2 5 mg by nebulization every 6 (six) hours as needed for wheezing      amitriptyline (ELAVIL) 75 mg tablet Take 1 tablet (75 mg total) by mouth daily at bedtime 30 tablet 3    buPROPion (WELLBUTRIN XL) 300 mg 24 hr tablet Take 300 mg by mouth every morning      busPIRone (BUSPAR) 10 mg tablet Take 10 mg by mouth 3 (three) times a day      calcitriol (ROCALTROL) 0 5 MCG capsule Take 0 5 mcg by mouth 2 (two) times a day        Calcium Carb-Cholecalciferol (CALCIUM 600 + D PO) 3 tablets 4 times Daily       cetirizine (ZyrTEC) 10 mg tablet TAKE ONE TABLET BY MOUTH EVERY DAY AS NEEDED (Patient taking differently: TAKE ONE TABLET BY MOUTH EVERY DAY) 30 tablet 6    Cholecalciferol (VITAMIN D PO) Take 50,000 Units by mouth once a week      clonazePAM (KlonoPIN) 1 mg tablet Take 1 mg by mouth daily at bedtime        Cyanocobalamin (VITAMIN B 12 PO) Take 1,000 mcg by mouth daily      cyclobenzaprine (FLEXERIL) 5 mg tablet TAKE ONE TABLET BY MOUTH EVERY DAY AT BEDTIME AS NEEDED FOR MUSCLE SPASMS 30 tablet 3    desvenlafaxine succinate (PRISTIQ) 50 mg 24 hr tablet Take 50 mg by mouth daily      eletriptan (RELPAX) 40 MG tablet Take by mouth      folic acid (FOLVITE) 1 mg tablet TAKE ONE TABLET BY MOUTH EVERY DAY 30 tablet 6    gabapentin (NEURONTIN) 300 mg capsule TAKE ONE CAPSULE BY MOUTH TWICE A DAY AND 2 CAPSULES BY MOUTH IN THE EVENING EACH DAY  120 capsule 6    gabapentin (NEURONTIN) 600 MG tablet Take 300 mg by mouth daily at bedtime      levothyroxine 175 mcg tablet Take 175 mcg by mouth Five days a week, mon- fri       magnesium oxide (MAG-OX) 400 mg Take 400 mg by mouth 3 (three) times a day      meclizine (ANTIVERT) 12 5 MG tablet Take 1 tablet (12 5 mg total) by mouth every 12 (twelve) hours as needed for dizziness 14 tablet 0    montelukast (SINGULAIR) 10 mg tablet TAKE ONE TABLET BY MOUTH EVERY DAY AS DIRECTED 30 tablet 6    Multiple Vitamin (MULTIVITAMIN) tablet Take 1 tablet by mouth daily      omeprazole (PriLOSEC) 40 MG capsule TAKE ONE CAPSULE BY MOUTH EVERY DAY 30 capsule 6    ondansetron (ZOFRAN) 4 mg tablet Take 1 tablet by mouth every 8 (eight) hours as needed for nausea or vomiting for up to 6 doses 6 tablet 0    ondansetron (ZOFRAN-ODT) 4 mg disintegrating tablet Take 1 tablet (4 mg total) by mouth every 8 (eight) hours 30 tablet 2    SM ASPIRIN ADULT LOW STRENGTH 81 MG EC tablet TAKE ONE TABLET BY MOUTH EVERY DAY AS DIRECTED 30 tablet 3    sucralfate (CARAFATE) 1 g/10 mL suspension Take 1 g by mouth as needed        topiramate (TOPAMAX) 50 MG tablet TAKE ONE TABLET BY MOUTH EVERY DAY AT BEDTIME 30 tablet 5    traMADol-acetaminophen (ULTRACET) 37 5-325 mg per tablet TAKE ONE TABLET BY MOUTH EVERY 12 HOURS AS NEEDED FOR MODERATE PAIN 30 tablet 0    Zolpidem Tartrate (AMBIEN PO) Take 10 mg by mouth daily at bedtime       No current facility-administered medications for this visit          Objective:    /60 (BP Location: Right arm, Patient Position: Sitting, Cuff Size: Large)   Pulse 92   Temp 98 °F (36 7 °C) (Tympanic)   Resp 16   Ht 5' 5" (0 961 m)   Wt 83 kg (183 lb)   LMP 09/06/2017   BMI 30 45 kg/m²        Physical Exam   Constitutional: She appears well-nourished  No distress  Cardiovascular: Normal rate and regular rhythm  Pulmonary/Chest: Effort normal    Abdominal: Soft  Bowel sounds are increased  There is generalized tenderness  There is no rigidity, no rebound, no guarding, no tenderness at McBurney's point and negative Avila's sign                 Assessment/Plan:         Diagnoses and all orders for this visit:    Gastroenteritis      supportive care  Likely self limited   Oral hydration   rto prn                       Hellen Lawson MD

## 2018-08-07 NOTE — LETTER
August 7, 2018     Patient: Destiny Arenas   YOB: 1975   Date of Visit: 8/7/2018       To Whom it May Concern:    Destiny Arenas is under my professional care  She was seen in my office on 8/7/2018  She may return to work on 8/10/18  Pt was out of work from 8/6/18    If you have any questions or concerns, please don't hesitate to call           Sincerely,          Marcie Sandhu MD        CC: No Recipients

## 2018-08-10 ENCOUNTER — TELEPHONE (OUTPATIENT)
Dept: FAMILY MEDICINE CLINIC | Facility: CLINIC | Age: 43
End: 2018-08-10

## 2018-08-10 NOTE — TELEPHONE ENCOUNTER
Dr Rhonda Rivero    Patient was given note to return to work today, but she is not feeling better  Patient would like new note to return to work on Monday

## 2018-08-17 ENCOUNTER — TELEPHONE (OUTPATIENT)
Dept: FAMILY MEDICINE CLINIC | Facility: CLINIC | Age: 43
End: 2018-08-17

## 2018-08-17 NOTE — TELEPHONE ENCOUNTER
Jen:    Patient dropped off forms for your review and signature  Please let her know when ready for pickup

## 2018-08-28 ENCOUNTER — OFFICE VISIT (OUTPATIENT)
Dept: FAMILY MEDICINE CLINIC | Facility: CLINIC | Age: 43
End: 2018-08-28
Payer: COMMERCIAL

## 2018-08-28 VITALS
RESPIRATION RATE: 12 BRPM | DIASTOLIC BLOOD PRESSURE: 74 MMHG | BODY MASS INDEX: 29.99 KG/M2 | HEIGHT: 65 IN | SYSTOLIC BLOOD PRESSURE: 116 MMHG | WEIGHT: 180 LBS | HEART RATE: 100 BPM | TEMPERATURE: 98.3 F

## 2018-08-28 DIAGNOSIS — M79.7 FIBROMYALGIA: ICD-10-CM

## 2018-08-28 DIAGNOSIS — G89.29 CHRONIC BILATERAL LOW BACK PAIN WITHOUT SCIATICA: ICD-10-CM

## 2018-08-28 DIAGNOSIS — M54.50 CHRONIC BILATERAL LOW BACK PAIN WITHOUT SCIATICA: ICD-10-CM

## 2018-08-28 PROCEDURE — 3008F BODY MASS INDEX DOCD: CPT | Performed by: NURSE PRACTITIONER

## 2018-08-28 PROCEDURE — 99213 OFFICE O/P EST LOW 20 MIN: CPT | Performed by: NURSE PRACTITIONER

## 2018-08-28 NOTE — PROGRESS NOTES
Assessment/Plan:    Problem List Items Addressed This Visit     Chronic low back pain    Relevant Medications    traMADol-acetaminophen (ULTRACET) 37 5-325 mg per tablet    Fibromyalgia    Relevant Medications    traMADol-acetaminophen (ULTRACET) 37 5-325 mg per tablet        Patient clinically stable at this time  Cont with current plan of care  RTO as recommended and PRN        Patient Instructions     Chronic Neck Pain   AMBULATORY CARE:   Chronic neck pain  may start to build slowly over time  Neck pain is chronic if it lasts longer than 3 months  The pain may come and go, or be worse with certain movements  The pain may be only in your neck, or it may move to your arms, back, or shoulders  You may have pain that starts in another body area and moves to your neck  You may have neck pain for years  Some types of neck pain can be permanent  Seek care immediately if:   · You have an injury that causes neck pain and shooting pain down your arms or legs  · Your neck pain suddenly becomes severe  · You have neck pain along with numbness, tingling, or weakness in your arms or legs  · You have a stiff neck, a headache, and a fever  Contact your healthcare provider if:   · You have new or worsening symptoms  · Your symptoms continue even after treatment  · You have questions or concerns about your condition or care  Treatment  may include any of the following, depending on what is causing your pain:  · Medicines  may be prescribed or recommended by your healthcare provider for pain  You may need medicine to treat nerve pain or to stop muscle spasms  Medicines may also be given to reduce inflammation  Your healthcare provider may inject medicine into a nerve to block pain  Over-the-counter NSAID medicine or acetaminophen may be recommended to help treat minor pain or inflammation  · Traction  is used to relieve pressure from nerves  Your head is gently pulled up and away from your neck   This stretches muscles and ligaments and makes more room for the spine  Your healthcare provider will tell you the kind of traction that will help your neck pain  Do not use traction devices at home unless directed by your healthcare provider  · Surgery  may be needed if the pain is severe or other treatments do not work  Surgery will not help every kind of neck pain  You may need surgery to stabilize a fractured bone or to remove a tumor  Surgery may also be used to widen a narrowed spinal column or to remove a disc from between neck bones  Manage or prevent chronic neck pain:   · Rest your neck as directed  Do not make sudden movements, such as turning your head quickly  Your healthcare provider may recommend you wear a cervical collar for a short time  The collar will prevent you from moving your head  This will give your neck time to heal if an injury is causing your neck pain  Ask your healthcare provider when you can return to sports or other normal daily activities  · Apply heat as directed  Heat helps relieve pain and swelling  Use a heat wrap, or soak a small towel in warm water  Wring out the extra water  Apply the heat wrap or towel for 20 minutes every hour, or as directed  · Apply ice as directed  Ice helps relieve pain and swelling, and can help prevent tissue damage  Use an ice pack, or put ice in a bag  Cover the ice pack or back with a towel before you apply it to your neck  Apply the ice pack or ice for 15 minutes every hour, or as directed  Your healthcare provider can tell you how often to apply ice  · Do neck exercises as directed  Neck exercises help strengthen the muscles and increase range of motion  Your healthcare provider will tell you which exercises are right for you  He may give you instructions, or he may recommend that you work with a physical therapist  Your healthcare provider or therapist can make sure you are doing the exercises correctly  · Maintain good posture  Try to keep your head and shoulders lifted when you sit  If you work in front of a computer, make sure the monitor is at the right level  You should not need to look up down to see the screen  You should also not have to lean forward to be able to read what is on the screen  Make sure your keyboard, mouse, and other computer items are placed where you do not have to extend your shoulder to reach them  Get up often if you work in front of a computer or sit for long periods of time  Stretch or walk around to keep your neck muscles loose  Follow up with your healthcare provider as directed: Your healthcare provider may refer you to a specialist if your pain does not get better with treatment  Write down your questions so you remember to ask them during your visits  © 2017 [employer] Information is for End User's use only and may not be sold, redistributed or otherwise used for commercial purposes  All illustrations and images included in CareNotes® are the copyrighted property of [employer]  or [employer]  The above information is an  only  It is not intended as medical advice for individual conditions or treatments  Talk to your doctor, nurse or pharmacist before following any medical regimen to see if it is safe and effective for you  Return in about 6 months (around 2/28/2019)  Subjective:      Patient ID: Clemon Canavan is a 37 y o  female      Chief Complaint   Patient presents with    Follow-up       Here for completion of disability forms to receive accommodations at work--see scanned copy of completed forms    In usual state of health    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    Sees specialists regularly ([facility], psych)        The following portions of the patient's history were reviewed and updated as appropriate: allergies, current medications, past family history, past medical history, past social history, past surgical history and problem list     Review of Systems   Constitutional: Positive for fatigue  Negative for activity change, fever and unexpected weight change  Respiratory: Negative  Musculoskeletal: Positive for arthralgias, back pain and myalgias  Neurological: Negative  Psychiatric/Behavioral: Positive for dysphoric mood  Negative for self-injury, sleep disturbance and suicidal ideas  The patient is nervous/anxious            Current Outpatient Prescriptions   Medication Sig Dispense Refill    albuterol (2 5 mg/3 mL) 0 083 % nebulizer solution Take 2 5 mg by nebulization every 6 (six) hours as needed for wheezing      amitriptyline (ELAVIL) 75 mg tablet Take 1 tablet (75 mg total) by mouth daily at bedtime 30 tablet 3    buPROPion (WELLBUTRIN XL) 300 mg 24 hr tablet Take 300 mg by mouth every morning      busPIRone (BUSPAR) 10 mg tablet Take 10 mg by mouth 3 (three) times a day      calcitriol (ROCALTROL) 0 5 MCG capsule Take 0 5 mcg by mouth 2 (two) times a day        Calcium Carb-Cholecalciferol (CALCIUM 600 + D PO) 3 tablets 4 times Daily       cetirizine (ZyrTEC) 10 mg tablet TAKE ONE TABLET BY MOUTH EVERY DAY AS NEEDED (Patient taking differently: TAKE ONE TABLET BY MOUTH EVERY DAY) 30 tablet 6    Cholecalciferol (VITAMIN D PO) Take 50,000 Units by mouth once a week      clonazePAM (KlonoPIN) 1 mg tablet Take 1 mg by mouth daily at bedtime        Cyanocobalamin (VITAMIN B 12 PO) Take 1,000 mcg by mouth daily      cyclobenzaprine (FLEXERIL) 5 mg tablet TAKE ONE TABLET BY MOUTH EVERY DAY AT BEDTIME AS NEEDED FOR MUSCLE SPASMS 30 tablet 3    desvenlafaxine succinate (PRISTIQ) 50 mg 24 hr tablet Take 50 mg by mouth daily      eletriptan (RELPAX) 40 MG tablet Take by mouth      folic acid (FOLVITE) 1 mg tablet TAKE ONE TABLET BY MOUTH EVERY DAY 30 tablet 6    gabapentin (NEURONTIN) 300 mg capsule TAKE ONE CAPSULE BY MOUTH TWICE A DAY AND 2 CAPSULES BY MOUTH IN THE EVENING EACH DAY  120 capsule 6    gabapentin (NEURONTIN) 600 MG tablet Take 300 mg by mouth daily at bedtime      levothyroxine 175 mcg tablet Take 175 mcg by mouth Five days a week, mon- fri       magnesium oxide (MAG-OX) 400 mg Take 400 mg by mouth 3 (three) times a day      meclizine (ANTIVERT) 12 5 MG tablet Take 1 tablet (12 5 mg total) by mouth every 12 (twelve) hours as needed for dizziness 14 tablet 0    montelukast (SINGULAIR) 10 mg tablet TAKE ONE TABLET BY MOUTH EVERY DAY AS DIRECTED 30 tablet 6    Multiple Vitamin (MULTIVITAMIN) tablet Take 1 tablet by mouth daily      omeprazole (PriLOSEC) 40 MG capsule TAKE ONE CAPSULE BY MOUTH EVERY DAY 30 capsule 6    ondansetron (ZOFRAN) 4 mg tablet Take 1 tablet by mouth every 8 (eight) hours as needed for nausea or vomiting for up to 6 doses 6 tablet 0    ondansetron (ZOFRAN-ODT) 4 mg disintegrating tablet Take 1 tablet (4 mg total) by mouth every 8 (eight) hours 30 tablet 2    SM ASPIRIN ADULT LOW STRENGTH 81 MG EC tablet TAKE ONE TABLET BY MOUTH EVERY DAY AS DIRECTED 30 tablet 3    sucralfate (CARAFATE) 1 g/10 mL suspension Take 1 g by mouth as needed        topiramate (TOPAMAX) 50 MG tablet TAKE ONE TABLET BY MOUTH EVERY DAY AT BEDTIME 30 tablet 5    traMADol-acetaminophen (ULTRACET) 37 5-325 mg per tablet Take 1 tablet by mouth every 12 (twelve) hours as needed for moderate pain 30 tablet 2    Zolpidem Tartrate (AMBIEN PO) Take 10 mg by mouth daily at bedtime       No current facility-administered medications for this visit  Objective:    /74 (BP Location: Left arm, Patient Position: Sitting, Cuff Size: Adult)   Pulse 100   Temp 98 3 °F (36 8 °C) (Temporal)   Resp 12   Ht 5' 5" (1 651 m)   Wt 81 6 kg (180 lb)   LMP 09/06/2017   BMI 29 95 kg/m²        Physical Exam   Constitutional: She is oriented to person, place, and time  Vital signs are normal  She appears well-developed and well-nourished  No distress     Eyes: Conjunctivae and EOM are normal  Pupils are equal, round, and reactive to light  Cardiovascular: Normal rate, regular rhythm, normal heart sounds and intact distal pulses  Pulmonary/Chest: Effort normal and breath sounds normal    Abdominal: Soft  Bowel sounds are normal  There is no tenderness  Musculoskeletal: She exhibits no edema  Neurological: She is alert and oriented to person, place, and time  She exhibits normal muscle tone  Coordination normal    Skin: Skin is warm and dry  No rash noted  No pallor  Psychiatric: She has a normal mood and affect  Nursing note and vitals reviewed               Jorge Cortes, Johnna Guido

## 2018-08-28 NOTE — PATIENT INSTRUCTIONS
Chronic Neck Pain   AMBULATORY CARE:   Chronic neck pain  may start to build slowly over time  Neck pain is chronic if it lasts longer than 3 months  The pain may come and go, or be worse with certain movements  The pain may be only in your neck, or it may move to your arms, back, or shoulders  You may have pain that starts in another body area and moves to your neck  You may have neck pain for years  Some types of neck pain can be permanent  Seek care immediately if:   · You have an injury that causes neck pain and shooting pain down your arms or legs  · Your neck pain suddenly becomes severe  · You have neck pain along with numbness, tingling, or weakness in your arms or legs  · You have a stiff neck, a headache, and a fever  Contact your healthcare provider if:   · You have new or worsening symptoms  · Your symptoms continue even after treatment  · You have questions or concerns about your condition or care  Treatment  may include any of the following, depending on what is causing your pain:  · Medicines  may be prescribed or recommended by your healthcare provider for pain  You may need medicine to treat nerve pain or to stop muscle spasms  Medicines may also be given to reduce inflammation  Your healthcare provider may inject medicine into a nerve to block pain  Over-the-counter NSAID medicine or acetaminophen may be recommended to help treat minor pain or inflammation  · Traction  is used to relieve pressure from nerves  Your head is gently pulled up and away from your neck  This stretches muscles and ligaments and makes more room for the spine  Your healthcare provider will tell you the kind of traction that will help your neck pain  Do not use traction devices at home unless directed by your healthcare provider  · Surgery  may be needed if the pain is severe or other treatments do not work  Surgery will not help every kind of neck pain   You may need surgery to stabilize a fractured bone or to remove a tumor  Surgery may also be used to widen a narrowed spinal column or to remove a disc from between neck bones  Manage or prevent chronic neck pain:   · Rest your neck as directed  Do not make sudden movements, such as turning your head quickly  Your healthcare provider may recommend you wear a cervical collar for a short time  The collar will prevent you from moving your head  This will give your neck time to heal if an injury is causing your neck pain  Ask your healthcare provider when you can return to sports or other normal daily activities  · Apply heat as directed  Heat helps relieve pain and swelling  Use a heat wrap, or soak a small towel in warm water  Wring out the extra water  Apply the heat wrap or towel for 20 minutes every hour, or as directed  · Apply ice as directed  Ice helps relieve pain and swelling, and can help prevent tissue damage  Use an ice pack, or put ice in a bag  Cover the ice pack or back with a towel before you apply it to your neck  Apply the ice pack or ice for 15 minutes every hour, or as directed  Your healthcare provider can tell you how often to apply ice  · Do neck exercises as directed  Neck exercises help strengthen the muscles and increase range of motion  Your healthcare provider will tell you which exercises are right for you  He may give you instructions, or he may recommend that you work with a physical therapist  Your healthcare provider or therapist can make sure you are doing the exercises correctly  · Maintain good posture  Try to keep your head and shoulders lifted when you sit  If you work in front of a computer, make sure the monitor is at the right level  You should not need to look up down to see the screen  You should also not have to lean forward to be able to read what is on the screen  Make sure your keyboard, mouse, and other computer items are placed where you do not have to extend your shoulder to reach them  Get up often if you work in front of a computer or sit for long periods of time  Stretch or walk around to keep your neck muscles loose  Follow up with your healthcare provider as directed: Your healthcare provider may refer you to a specialist if your pain does not get better with treatment  Write down your questions so you remember to ask them during your visits  © 2017 [employer]  Information is for End User's use only and may not be sold, redistributed or otherwise used for commercial purposes  All illustrations and images included in CareNotes® are the copyrighted property of [employer]  or [employer]  The above information is an  only  It is not intended as medical advice for individual conditions or treatments  Talk to your doctor, nurse or pharmacist before following any medical regimen to see if it is safe and effective for you

## 2018-09-01 ENCOUNTER — HOSPITAL ENCOUNTER (EMERGENCY)
Facility: HOSPITAL | Age: 43
Discharge: HOME | End: 2018-09-01
Attending: EMERGENCY MEDICINE | Admitting: EMERGENCY MEDICINE
Payer: COMMERCIAL

## 2018-09-01 ENCOUNTER — TELEPHONE (OUTPATIENT)
Dept: FAMILY MEDICINE CLINIC | Facility: CLINIC | Age: 43
End: 2018-09-01

## 2018-09-01 ENCOUNTER — APPOINTMENT (EMERGENCY)
Dept: RADIOLOGY | Facility: HOSPITAL | Age: 43
End: 2018-09-01
Payer: COMMERCIAL

## 2018-09-01 VITALS
DIASTOLIC BLOOD PRESSURE: 72 MMHG | RESPIRATION RATE: 18 BRPM | TEMPERATURE: 98.6 F | HEART RATE: 87 BPM | SYSTOLIC BLOOD PRESSURE: 122 MMHG | OXYGEN SATURATION: 97 %

## 2018-09-01 DIAGNOSIS — S16.1XXA CERVICAL MUSCLE STRAIN: Primary | ICD-10-CM

## 2018-09-01 PROCEDURE — 99283 EMERGENCY DEPT VISIT LOW MDM: CPT

## 2018-09-01 PROCEDURE — 72040 X-RAY EXAM NECK SPINE 2-3 VW: CPT

## 2018-09-01 RX ORDER — PREDNISONE 20 MG/1
40 TABLET ORAL ONCE
Status: COMPLETED | OUTPATIENT
Start: 2018-09-01 | End: 2018-09-01

## 2018-09-01 RX ORDER — PREDNISONE 20 MG/1
20 TABLET ORAL 2 TIMES DAILY WITH MEALS
Qty: 10 TABLET | Refills: 0 | Status: SHIPPED | OUTPATIENT
Start: 2018-09-01 | End: 2018-09-06

## 2018-09-01 RX ORDER — DIAZEPAM 5 MG/1
5 TABLET ORAL ONCE
Status: COMPLETED | OUTPATIENT
Start: 2018-09-01 | End: 2018-09-01

## 2018-09-01 RX ORDER — DIAZEPAM 5 MG/1
5 TABLET ORAL EVERY 8 HOURS PRN
Qty: 15 TABLET | Refills: 0 | Status: SHIPPED | OUTPATIENT
Start: 2018-09-01 | End: 2018-09-04

## 2018-09-01 RX ADMIN — DIAZEPAM 5 MG: 5 TABLET ORAL at 14:28

## 2018-09-01 RX ADMIN — PREDNISONE 40 MG: 20 TABLET ORAL at 14:27

## 2018-09-01 NOTE — ED PROVIDER NOTES
History  Chief Complaint   Patient presents with    Neck Pain     pt presents to the ed with neck and back pain x 1 day  pt states that she has a prior c spine injury and woke up this morning with radiating pain      Patient is a 60-year-old female presents for evaluation neck pain  Patient has a history of cervical spasms in the past   Woke up this morning with it  Took a Flexeril which did not help  Denies falls or trauma  Denies any fevers or chills  Denies any chest pain shortness of breath  Denies any headache or loss consciousness  Denies any weakness or numbness  Denies any bowel or bladder dysfunction  Prior to Admission Medications   Prescriptions Last Dose Informant Patient Reported? Taking?    Calcium Carb-Cholecalciferol (CALCIUM 600 + D PO)  Self Yes No   Sig: 3 tablets 4 times Daily    Cholecalciferol (VITAMIN D PO)  Self Yes No   Sig: Take 50,000 Units by mouth once a week   Cyanocobalamin (VITAMIN B 12 PO)  Self Yes No   Sig: Take 1,000 mcg by mouth daily   Multiple Vitamin (MULTIVITAMIN) tablet  Self Yes No   Sig: Take 1 tablet by mouth daily   SM ASPIRIN ADULT LOW STRENGTH 81 MG EC tablet   No No   Sig: TAKE ONE TABLET BY MOUTH EVERY DAY AS DIRECTED   Zolpidem Tartrate (AMBIEN PO)  Self Yes No   Sig: Take 10 mg by mouth daily at bedtime   albuterol (2 5 mg/3 mL) 0 083 % nebulizer solution  Self Yes No   Sig: Take 2 5 mg by nebulization every 6 (six) hours as needed for wheezing   amitriptyline (ELAVIL) 75 mg tablet   No No   Sig: Take 1 tablet (75 mg total) by mouth daily at bedtime   buPROPion (WELLBUTRIN XL) 300 mg 24 hr tablet   Yes No   Sig: Take 300 mg by mouth every morning   busPIRone (BUSPAR) 10 mg tablet   Yes No   Sig: Take 10 mg by mouth 3 (three) times a day   calcitriol (ROCALTROL) 0 5 MCG capsule  Self Yes No   Sig: Take 0 5 mcg by mouth 2 (two) times a day     cetirizine (ZyrTEC) 10 mg tablet  Self No No   Sig: TAKE ONE TABLET BY MOUTH EVERY DAY AS NEEDED   Patient taking differently: TAKE ONE TABLET BY MOUTH EVERY DAY   clonazePAM (KlonoPIN) 1 mg tablet  Self Yes No   Sig: Take 1 mg by mouth daily at bedtime     cyclobenzaprine (FLEXERIL) 5 mg tablet   No No   Sig: TAKE ONE TABLET BY MOUTH EVERY DAY AT BEDTIME AS NEEDED FOR MUSCLE SPASMS   desvenlafaxine succinate (PRISTIQ) 50 mg 24 hr tablet  Self Yes No   Sig: Take 50 mg by mouth daily   eletriptan (RELPAX) 40 MG tablet   Yes No   Sig: Take by mouth   folic acid (FOLVITE) 1 mg tablet   No No   Sig: TAKE ONE TABLET BY MOUTH EVERY DAY   gabapentin (NEURONTIN) 300 mg capsule   No No   Sig: TAKE ONE CAPSULE BY MOUTH TWICE A DAY AND 2 CAPSULES BY MOUTH IN THE EVENING EACH DAY     gabapentin (NEURONTIN) 600 MG tablet  Self Yes No   Sig: Take 300 mg by mouth daily at bedtime   levothyroxine 175 mcg tablet  Self Yes No   Sig: Take 175 mcg by mouth Five days a week, mon- fri    magnesium oxide (MAG-OX) 400 mg  Self Yes No   Sig: Take 400 mg by mouth 3 (three) times a day   meclizine (ANTIVERT) 12 5 MG tablet   No No   Sig: Take 1 tablet (12 5 mg total) by mouth every 12 (twelve) hours as needed for dizziness   montelukast (SINGULAIR) 10 mg tablet   No No   Sig: TAKE ONE TABLET BY MOUTH EVERY DAY AS DIRECTED   omeprazole (PriLOSEC) 40 MG capsule   No No   Sig: TAKE ONE CAPSULE BY MOUTH EVERY DAY   ondansetron (ZOFRAN) 4 mg tablet  Self No No   Sig: Take 1 tablet by mouth every 8 (eight) hours as needed for nausea or vomiting for up to 6 doses   ondansetron (ZOFRAN-ODT) 4 mg disintegrating tablet   No No   Sig: Take 1 tablet (4 mg total) by mouth every 8 (eight) hours   sucralfate (CARAFATE) 1 g/10 mL suspension  Self Yes No   Sig: Take 1 g by mouth as needed     topiramate (TOPAMAX) 50 MG tablet   No No   Sig: TAKE ONE TABLET BY MOUTH EVERY DAY AT BEDTIME   traMADol-acetaminophen (ULTRACET) 37 5-325 mg per tablet   No No   Sig: Take 1 tablet by mouth every 12 (twelve) hours as needed for moderate pain      Facility-Administered Medications: None       Past Medical History:   Diagnosis Date    Asthma     Cancer ([facility] [address] )     thyroid    Cervical adenopathy     Concussion without loss of consciousness     Depression     Disease of thyroid gland     Esophageal reflux     Frequent headaches     Gastritis     Hypocalcemia     Hypoglycemia     Malignant neoplasm of thyroid gland (HCC)     Migraine     Myositis of multiple sites     Psychiatric disorder     depression    Sinus headache     Worsening headaches        Past Surgical History:   Procedure Laterality Date    ABDOMINAL SURGERY  2011    gastric bypass    CHOLECYSTECTOMY  1993    lap    COLONOSCOPY N/A 12/12/2016    Procedure: COLONOSCOPY;  Surgeon: Merissa Gregg MD;  Location: [place] [address] GI LAB; Service:     ENDOMETRIAL BIOPSY      last assessed: 7/26/17    ESOPHAGOGASTRODUODENOSCOPY N/A 12/12/2016    Procedure: ESOPHAGOGASTRODUODENOSCOPY (EGD); Surgeon: Merissa Gregg MD;  Location[facility]; Service:     ESSURE TUBAL LIGATION      GASTRIC BYPASS      HERNIA REPAIR      hiatel hernia repair    HYSTERECTOMY      INTUSSUSCEPTION REPAIR  2016    LEISA-EN-Y PROCEDURE  2011    last assessed: 10/5/16    THYROID SURGERY      THYROIDECTOMY  2016       Family History   Problem Relation Age of Onset    Lupus Mother         systemic erythematosus    Crohn's disease Mother     Diabetes Father     Liver disease Father     Rheum arthritis Maternal Grandmother      I have reviewed and agree with the history as documented  Social History   Substance Use Topics    Smoking status: Never Smoker    Smokeless tobacco: Never Used    Alcohol use Yes      Comment: occasional        Review of Systems   Constitutional: Negative for activity change, appetite change and fatigue  HENT: Negative for nosebleeds, sneezing, sore throat, trouble swallowing and voice change  Eyes: Negative for photophobia, pain and visual disturbance     Respiratory: Negative for apnea, choking and stridor  Cardiovascular: Negative for palpitations and leg swelling  Gastrointestinal: Negative for anal bleeding and constipation  Endocrine: Negative for cold intolerance, heat intolerance, polydipsia and polyphagia  Genitourinary: Negative for decreased urine volume, enuresis, frequency, genital sores and urgency  Musculoskeletal: Negative for joint swelling and myalgias  Allergic/Immunologic: Negative for environmental allergies and food allergies  Neurological: Negative for tremors, seizures, speech difficulty and weakness  Hematological: Negative for adenopathy  Psychiatric/Behavioral: Negative for behavioral problems, decreased concentration, dysphoric mood and hallucinations  Physical Exam  Physical Exam   Constitutional: She is oriented to person, place, and time  She appears well-developed and well-nourished  No distress  HENT:   Head: Normocephalic and atraumatic  Right Ear: External ear normal    Left Ear: External ear normal    Nose: Nose normal    Mouth/Throat: Oropharynx is clear and moist    Eyes: Conjunctivae and EOM are normal  Pupils are equal, round, and reactive to light  Neck: Normal range of motion  Neck supple  Cardiovascular: Normal rate, regular rhythm and normal heart sounds  Exam reveals no gallop and no friction rub  No murmur heard  Pulmonary/Chest: Effort normal and breath sounds normal  No respiratory distress  She has no wheezes  Abdominal: Soft  Bowel sounds are normal    Musculoskeletal: She exhibits tenderness  She exhibits no deformity  Cervical back: She exhibits decreased range of motion, tenderness, bony tenderness, pain and spasm  She exhibits no swelling, no edema, no deformity, no laceration and normal pulse  Neurological: She is alert and oriented to person, place, and time  No cranial nerve deficit  Skin: Skin is warm and dry  She is not diaphoretic  Psychiatric: She has a normal mood and affect   Her behavior is normal    Vitals reviewed  Vital Signs  ED Triage Vitals [09/01/18 1345]   Temperature Pulse Respirations Blood Pressure SpO2   98 6 °F (37 °C) 87 18 122/72 97 %      Temp Source Heart Rate Source Patient Position - Orthostatic VS BP Location FiO2 (%)   Tympanic Monitor -- Right arm --      Pain Score       --           Vitals:    09/01/18 1345   BP: 122/72   Pulse: 87       Visual Acuity      ED Medications  Medications   predniSONE tablet 40 mg (40 mg Oral Given 9/1/18 1427)   diazepam (VALIUM) tablet 5 mg (5 mg Oral Given 9/1/18 1428)       Diagnostic Studies  Results Reviewed     None                 XR spine cervical 2 or 3 vw injury   Final Result by Daniel Smith MD (09/01 1506)      Unremarkable cervical spine  Workstation performed: [id number]                    Procedures  Procedures       Phone Contacts  ED Phone Contact    ED Course                               MDM  CritCare Time    Disposition  Final diagnoses:   Cervical muscle strain     Time reflects when diagnosis was documented in both MDM as applicable and the Disposition within this note     Time User Action Codes Description Comment    9/1/2018  3:20 PM Sedrick Giron Add [S16  1XXA] Cervical muscle strain       ED Disposition     ED Disposition Condition Comment    Discharge  [place] discharge to home/self care      Condition at discharge: Stable        Follow-up Information     Follow up With Specialties Details Why Contact Info    Felix Bell, [address] Nurse Practitioner Schedule an appointment as soon as possible for a visit  [address] [facility] [address]  [phone]            Patient's Medications   Discharge Prescriptions    DIAZEPAM (VALIUM) 5 MG TABLET    Take 1 tablet (5 mg total) by mouth every 8 (eight) hours as needed for anxiety for up to 5 days       Start Date: 9/1/2018  End Date: 9/6/2018       Order Dose: 5 mg       Quantity: 15 tablet    Refills: 0    PREDNISONE 20 MG TABLET Take 1 tablet (20 mg total) by mouth 2 (two) times a day with meals for 5 days       Start Date: 9/1/2018  End Date: 9/6/2018       Order Dose: 20 mg       Quantity: 10 tablet    Refills: 0     No discharge procedures on file      ED Provider  Electronically Signed by           Mouna Cooley PA-C  09/01/18 5257

## 2018-09-01 NOTE — TELEPHONE ENCOUNTER
Letitia York,     Patient said she has a herniated disk in her spine and she can;t move her head from side to side or life her arms up  She is going to the ER but just wanted to let you know

## 2018-09-01 NOTE — DISCHARGE INSTRUCTIONS
Cervical Strain   WHAT YOU NEED TO KNOW:   A cervical strain is a stretched or torn muscle or tendon in your neck  Tendons are strong tissues that connect muscles to bones  Common causes of cervical strains include a car accident, a fall, or a sports injury  DISCHARGE INSTRUCTIONS:   Seek care immediately if:   · You have pain or numbness from your shoulder down to your hand  · You have problems with your vision, hearing, or balance  · You feel confused or cannot concentrate  · You have problems with movement and strength  Contact your healthcare provider if:   · You have increased swelling or pain in your neck  · You have questions or concerns about your condition or care  Medicines: You may need any of the following:  · Acetaminophen  decreases pain and fever  It is available without a doctor's order  Ask how much to take and how often to take it  Follow directions  Read the labels of all other medicines you are using to see if they also contain acetaminophen, or ask your doctor or pharmacist  Acetaminophen can cause liver damage if not taken correctly  Do not use more than 4 grams (4,000 milligrams) total of acetaminophen in one day  · NSAIDs , such as ibuprofen, help decrease swelling, pain, and fever  This medicine is available with or without a doctor's order  NSAIDs can cause stomach bleeding or kidney problems in certain people  If you take blood thinner medicine, always ask your healthcare provider if NSAIDs are safe for you  Always read the medicine label and follow directions  · Muscle relaxers  help decrease pain and muscle spasms  · Prescription pain medicine  may be given  Ask your healthcare provider how to take this medicine safely  Some prescription pain medicines contain acetaminophen  Do not take other medicines that contain acetaminophen without talking to your healthcare provider  Too much acetaminophen may cause liver damage   Prescription pain medicine may cause constipation  Ask your healthcare provider how to prevent or treat constipation  · Take your medicine as directed  Contact your healthcare provider if you think your medicine is not helping or if you have side effects  Tell him or her if you are allergic to any medicine  Keep a list of the medicines, vitamins, and herbs you take  Include the amounts, and when and why you take them  Bring the list or the pill bottles to follow-up visits  Carry your medicine list with you in case of an emergency  Manage your symptoms:   · Apply heat  on your neck for 15 to 20 minutes, 4 to 6 times a day or as directed  Heat helps decrease pain, stiffness, and muscle spasms  · Begin gentle neck exercises  as soon as you can move your neck without pain  Exercises will help decrease stiffness and improve the strength and movement of your neck  Ask your healthcare provider what kind of exercises you should do  · Gradually return to your usual activities as directed  Stop if you have pain  Avoid activities that can cause more damage to your neck, such as heavy lifting or strenuous exercise  · Sleep without a pillow  to help decrease pain  Instead, roll a small towel tightly and place it under your neck  · Go to physical therapy as directed  A physical therapist teaches you exercises to help improve movement and strength, and to decrease pain  Prevent neck injury:   · Drive safely  Make sure everyone in your car wears a seatbelt  A seatbelt can save your life if you are in an accident  Do not use your cell phone when you are driving  This could distract you and cause an accident  Pull over if you need to make a call or send a text message  · Wear helmets, lifejackets, and protective gear  Always wear a helmet when you ride a bike or motorcycle, go skiing, or play sports that could cause a head injury  Wear protective equipment when you play sports   Wear a lifejacket when you are on a boat or doing water sports  Follow up with your healthcare provider as directed: You may be referred to an orthopedist or physical therapies  Write down your questions so you remember to ask them during your visits  © 2017 [facility] Information is for End User's use only and may not be sold, redistributed or otherwise used for commercial purposes  All illustrations and images included in CareNotes® are the copyrighted property of [employer]  or [employer]  The above information is an  only  It is not intended as medical advice for individual conditions or treatments  Talk to your doctor, nurse or pharmacist before following any medical regimen to see if it is safe and effective for you

## 2018-09-02 ENCOUNTER — APPOINTMENT (EMERGENCY)
Dept: RADIOLOGY | Facility: HOSPITAL | Age: 43
End: 2018-09-02
Payer: COMMERCIAL

## 2018-09-02 ENCOUNTER — HOSPITAL ENCOUNTER (EMERGENCY)
Facility: HOSPITAL | Age: 43
Discharge: HOME | End: 2018-09-02
Attending: EMERGENCY MEDICINE | Admitting: EMERGENCY MEDICINE
Payer: COMMERCIAL

## 2018-09-02 VITALS
DIASTOLIC BLOOD PRESSURE: 63 MMHG | RESPIRATION RATE: 16 BRPM | SYSTOLIC BLOOD PRESSURE: 104 MMHG | TEMPERATURE: 97.7 F | OXYGEN SATURATION: 100 % | HEART RATE: 71 BPM

## 2018-09-02 DIAGNOSIS — R55 SYNCOPE: Primary | ICD-10-CM

## 2018-09-02 LAB
ALBUMIN SERPL BCP-MCNC: 3.2 G/DL (ref 3.5–5)
ALP SERPL-CCNC: 82 U/L (ref 46–116)
ALT SERPL W P-5'-P-CCNC: 20 U/L (ref 12–78)
AMPHETAMINES SERPL QL SCN: NEGATIVE
ANION GAP SERPL CALCULATED.3IONS-SCNC: 7 MMOL/L (ref 4–13)
AST SERPL W P-5'-P-CCNC: 26 U/L (ref 5–45)
BARBITURATES UR QL: NEGATIVE
BASOPHILS # BLD AUTO: 0.02 THOUSANDS/ΜL (ref 0–0.1)
BASOPHILS NFR BLD AUTO: 0 % (ref 0–1)
BENZODIAZ UR QL: POSITIVE
BILIRUB SERPL-MCNC: 0.2 MG/DL (ref 0.2–1)
BILIRUB UR QL STRIP: ABNORMAL
BUN SERPL-MCNC: 16 MG/DL (ref 5–25)
CALCIUM SERPL-MCNC: 8.2 MG/DL (ref 8.3–10.1)
CHLORIDE SERPL-SCNC: 104 MMOL/L (ref 100–108)
CLARITY UR: CLEAR
CO2 SERPL-SCNC: 25 MMOL/L (ref 21–32)
COCAINE UR QL: NEGATIVE
COLOR UR: YELLOW
CREAT SERPL-MCNC: 0.95 MG/DL (ref 0.6–1.3)
EOSINOPHIL # BLD AUTO: 0.17 THOUSAND/ΜL (ref 0–0.61)
EOSINOPHIL NFR BLD AUTO: 2 % (ref 0–6)
ERYTHROCYTE [DISTWIDTH] IN BLOOD BY AUTOMATED COUNT: 12.4 % (ref 11.6–15.1)
ERYTHROCYTE [SEDIMENTATION RATE] IN BLOOD: 11 MM/HOUR (ref 2–25)
GFR SERPL CREATININE-BSD FRML MDRD: 74 ML/MIN/1.73SQ M
GLUCOSE SERPL-MCNC: 72 MG/DL (ref 65–140)
GLUCOSE SERPL-MCNC: 82 MG/DL (ref 65–140)
GLUCOSE UR STRIP-MCNC: NEGATIVE MG/DL
HCT VFR BLD AUTO: 39.4 % (ref 34.8–46.1)
HGB BLD-MCNC: 12.4 G/DL (ref 11.5–15.4)
HGB UR QL STRIP.AUTO: NEGATIVE
IMM GRANULOCYTES # BLD AUTO: 0.04 THOUSAND/UL (ref 0–0.2)
IMM GRANULOCYTES NFR BLD AUTO: 0 % (ref 0–2)
KETONES UR STRIP-MCNC: ABNORMAL MG/DL
LEUKOCYTE ESTERASE UR QL STRIP: NEGATIVE
LYMPHOCYTES # BLD AUTO: 2.3 THOUSANDS/ΜL (ref 0.6–4.47)
LYMPHOCYTES NFR BLD AUTO: 21 % (ref 14–44)
MAGNESIUM SERPL-MCNC: 1.7 MG/DL (ref 1.6–2.6)
MCH RBC QN AUTO: 29.7 PG (ref 26.8–34.3)
MCHC RBC AUTO-ENTMCNC: 31.5 G/DL (ref 31.4–37.4)
MCV RBC AUTO: 95 FL (ref 82–98)
METHADONE UR QL: NEGATIVE
MONOCYTES # BLD AUTO: 0.65 THOUSAND/ΜL (ref 0.17–1.22)
MONOCYTES NFR BLD AUTO: 6 % (ref 4–12)
NEUTROPHILS # BLD AUTO: 7.64 THOUSANDS/ΜL (ref 1.85–7.62)
NEUTS SEG NFR BLD AUTO: 71 % (ref 43–75)
NITRITE UR QL STRIP: NEGATIVE
NRBC BLD AUTO-RTO: 0 /100 WBCS
OPIATES UR QL SCN: NEGATIVE
PCP UR QL: NEGATIVE
PH UR STRIP.AUTO: 5.5 [PH] (ref 5–9)
PLATELET # BLD AUTO: 335 THOUSANDS/UL (ref 149–390)
PMV BLD AUTO: 10.2 FL (ref 8.9–12.7)
POTASSIUM SERPL-SCNC: 3.3 MMOL/L (ref 3.5–5.3)
PROT SERPL-MCNC: 6.6 G/DL (ref 6.4–8.2)
PROT UR STRIP-MCNC: NEGATIVE MG/DL
RBC # BLD AUTO: 4.17 MILLION/UL (ref 3.81–5.12)
SODIUM SERPL-SCNC: 136 MMOL/L (ref 136–145)
SP GR UR STRIP.AUTO: >=1.03 (ref 1–1.03)
THC UR QL: NEGATIVE
TROPONIN I SERPL-MCNC: <0.02 NG/ML
TSH SERPL DL<=0.05 MIU/L-ACNC: 0.02 UIU/ML (ref 0.36–3.74)
UROBILINOGEN UR QL STRIP.AUTO: 0.2 E.U./DL
WBC # BLD AUTO: 10.82 THOUSAND/UL (ref 4.31–10.16)

## 2018-09-02 PROCEDURE — 72125 CT NECK SPINE W/O DYE: CPT

## 2018-09-02 PROCEDURE — 82948 REAGENT STRIP/BLOOD GLUCOSE: CPT

## 2018-09-02 PROCEDURE — 85025 COMPLETE CBC W/AUTO DIFF WBC: CPT | Performed by: PHYSICIAN ASSISTANT

## 2018-09-02 PROCEDURE — 80201 ASSAY OF TOPIRAMATE: CPT | Performed by: PHYSICIAN ASSISTANT

## 2018-09-02 PROCEDURE — 80307 DRUG TEST PRSMV CHEM ANLYZR: CPT | Performed by: PHYSICIAN ASSISTANT

## 2018-09-02 PROCEDURE — 85652 RBC SED RATE AUTOMATED: CPT | Performed by: PHYSICIAN ASSISTANT

## 2018-09-02 PROCEDURE — 36415 COLL VENOUS BLD VENIPUNCTURE: CPT | Performed by: PHYSICIAN ASSISTANT

## 2018-09-02 PROCEDURE — 83735 ASSAY OF MAGNESIUM: CPT | Performed by: PHYSICIAN ASSISTANT

## 2018-09-02 PROCEDURE — 74177 CT ABD & PELVIS W/CONTRAST: CPT

## 2018-09-02 PROCEDURE — 99285 EMERGENCY DEPT VISIT HI MDM: CPT

## 2018-09-02 PROCEDURE — 96361 HYDRATE IV INFUSION ADD-ON: CPT

## 2018-09-02 PROCEDURE — 70450 CT HEAD/BRAIN W/O DYE: CPT

## 2018-09-02 PROCEDURE — 81003 URINALYSIS AUTO W/O SCOPE: CPT | Performed by: PHYSICIAN ASSISTANT

## 2018-09-02 PROCEDURE — 80053 COMPREHEN METABOLIC PANEL: CPT | Performed by: PHYSICIAN ASSISTANT

## 2018-09-02 PROCEDURE — 71260 CT THORAX DX C+: CPT

## 2018-09-02 PROCEDURE — 84484 ASSAY OF TROPONIN QUANT: CPT | Performed by: PHYSICIAN ASSISTANT

## 2018-09-02 PROCEDURE — 93005 ELECTROCARDIOGRAM TRACING: CPT

## 2018-09-02 PROCEDURE — 84443 ASSAY THYROID STIM HORMONE: CPT | Performed by: PHYSICIAN ASSISTANT

## 2018-09-02 PROCEDURE — 96360 HYDRATION IV INFUSION INIT: CPT

## 2018-09-02 RX ADMIN — SODIUM CHLORIDE 1000 ML: 0.9 INJECTION, SOLUTION INTRAVENOUS at 13:33

## 2018-09-02 RX ADMIN — IOHEXOL 100 ML: 350 INJECTION, SOLUTION INTRAVENOUS at 15:09

## 2018-09-02 NOTE — ED PROVIDER NOTES
History  Chief Complaint   Patient presents with    Syncope     Arrives via medic  Per medic patient was in line at [facility] and started to pass out and her  lowered her down   bp 90/60 and FSBS 108 on medic arrival  500 ml IVF and BP 96/50 , second 500 ml bolus /62 HR 90's  On arrival patient lies with eyes closed and is slow to respond, states she feels heavy  Patient seen in ED yesterday with her neck pain and prescribed Valium and Prednisone  States she took her Tramadol and Topamax this morning and did not take Valium  Patient is a 14-year-old female presents with her boyfriend for evaluation syncope  Patient was found at [employer] after standing in the line and was found to have a syncopal episode  Is unclear she did fall on her head  Much of the history is obtained from the boyfriend  Patient does offer complaints of headache neck pain chest pain and abdominal pain  She is sleeping but easily arousable  She is slow to respond on questioning  She was actually seen here yesterday by myself and treated for muscle spasm of the cervical spine and prescribed Valium and prednisone  The patient does relate that she is on multiple medications to include Pristiq, Wellbutrin, tramadol, Topamax, calcified trial, Neurontin, and levothyroxine  Apparently the patient has been trying various combinations of these medications to relieve her cervical spasm  It is unclear from her boyfriend as to how much and what she has taken  Prior to Admission Medications   Prescriptions Last Dose Informant Patient Reported? Taking?    Calcium Carb-Cholecalciferol (CALCIUM 600 + D PO)  Self Yes No   Sig: 3 tablets 4 times Daily    Cholecalciferol (VITAMIN D PO)  Self Yes No   Sig: Take 50,000 Units by mouth once a week   Cyanocobalamin (VITAMIN B 12 PO)  Self Yes No   Sig: Take 1,000 mcg by mouth daily   Multiple Vitamin (MULTIVITAMIN) tablet  Self Yes No   Sig: Take 1 tablet by mouth daily   SM ASPIRIN ADULT LOW STRENGTH 81 MG EC tablet   No No   Sig: TAKE ONE TABLET BY MOUTH EVERY DAY AS DIRECTED   Zolpidem Tartrate (AMBIEN PO)  Self Yes No   Sig: Take 10 mg by mouth daily at bedtime   albuterol (2 5 mg/3 mL) 0 083 % nebulizer solution  Self Yes No   Sig: Take 2 5 mg by nebulization every 6 (six) hours as needed for wheezing   amitriptyline (ELAVIL) 75 mg tablet   No No   Sig: Take 1 tablet (75 mg total) by mouth daily at bedtime   buPROPion (WELLBUTRIN XL) 300 mg 24 hr tablet   Yes No   Sig: Take 300 mg by mouth every morning   busPIRone (BUSPAR) 10 mg tablet   Yes No   Sig: Take 10 mg by mouth 3 (three) times a day   calcitriol (ROCALTROL) 0 5 MCG capsule  Self Yes No   Sig: Take 0 5 mcg by mouth 2 (two) times a day     cetirizine (ZyrTEC) 10 mg tablet  Self No No   Sig: TAKE ONE TABLET BY MOUTH EVERY DAY AS NEEDED   Patient taking differently: TAKE ONE TABLET BY MOUTH EVERY DAY   clonazePAM (KlonoPIN) 1 mg tablet  Self Yes No   Sig: Take 1 mg by mouth daily at bedtime     cyclobenzaprine (FLEXERIL) 5 mg tablet   No No   Sig: TAKE ONE TABLET BY MOUTH EVERY DAY AT BEDTIME AS NEEDED FOR MUSCLE SPASMS   desvenlafaxine succinate (PRISTIQ) 50 mg 24 hr tablet  Self Yes No   Sig: Take 50 mg by mouth daily   diazepam (VALIUM) 5 mg tablet   No No   Sig: Take 1 tablet (5 mg total) by mouth every 8 (eight) hours as needed for anxiety for up to 5 days   eletriptan (RELPAX) 40 MG tablet   Yes No   Sig: Take by mouth   folic acid (FOLVITE) 1 mg tablet   No No   Sig: TAKE ONE TABLET BY MOUTH EVERY DAY   gabapentin (NEURONTIN) 300 mg capsule   No No   Sig: TAKE ONE CAPSULE BY MOUTH TWICE A DAY AND 2 CAPSULES BY MOUTH IN THE EVENING EACH DAY     gabapentin (NEURONTIN) 600 MG tablet  Self Yes No   Sig: Take 300 mg by mouth daily at bedtime   levothyroxine 175 mcg tablet  Self Yes No   Sig: Take 175 mcg by mouth Five days a week, mon- fri    magnesium oxide (MAG-OX) 400 mg  Self Yes No   Sig: Take 400 mg by mouth 3 (three) times a day   meclizine (ANTIVERT) 12 5 MG tablet   No No   Sig: Take 1 tablet (12 5 mg total) by mouth every 12 (twelve) hours as needed for dizziness   montelukast (SINGULAIR) 10 mg tablet   No No   Sig: TAKE ONE TABLET BY MOUTH EVERY DAY AS DIRECTED   omeprazole (PriLOSEC) 40 MG capsule   No No   Sig: TAKE ONE CAPSULE BY MOUTH EVERY DAY   ondansetron (ZOFRAN) 4 mg tablet  Self No No   Sig: Take 1 tablet by mouth every 8 (eight) hours as needed for nausea or vomiting for up to 6 doses   ondansetron (ZOFRAN-ODT) 4 mg disintegrating tablet   No No   Sig: Take 1 tablet (4 mg total) by mouth every 8 (eight) hours   predniSONE 20 mg tablet   No No   Sig: Take 1 tablet (20 mg total) by mouth 2 (two) times a day with meals for 5 days   sucralfate (CARAFATE) 1 g/10 mL suspension  Self Yes No   Sig: Take 1 g by mouth as needed     topiramate (TOPAMAX) 50 MG tablet   No No   Sig: TAKE ONE TABLET BY MOUTH EVERY DAY AT BEDTIME   traMADol-acetaminophen (ULTRACET) 37 5-325 mg per tablet   No No   Sig: Take 1 tablet by mouth every 12 (twelve) hours as needed for moderate pain      Facility-Administered Medications: None       Past Medical History:   Diagnosis Date    Asthma     Cancer ([address] )     thyroid    Cervical adenopathy     Concussion without loss of consciousness     Depression     Disease of thyroid gland     Esophageal reflux     Frequent headaches     Gastritis     Hypocalcemia     Hypoglycemia     Malignant neoplasm of thyroid gland (HCC)     Migraine     Myositis of multiple sites     Psychiatric disorder     depression    Sinus headache     Worsening headaches        Past Surgical History:   Procedure Laterality Date    ABDOMINAL SURGERY  2011    gastric bypass    CHOLECYSTECTOMY  1993    lap    COLONOSCOPY N/A 12/12/2016    Procedure: COLONOSCOPY;  Surgeon: Mahsa Vasquez MD;  Location: [place] [address] GI LAB;   Service:     ENDOMETRIAL BIOPSY      last assessed: 7/26/17   [facility] ESOPHAGOGASTRODUODENOSCOPY N/A 12/12/2016    Procedure: ESOPHAGOGASTRODUODENOSCOPY (EGD); Surgeon: Merissa Gregg MD;  Location: [facility] GI LAB; Service:     ESSURE TUBAL LIGATION      GASTRIC BYPASS      HERNIA REPAIR      hiatel hernia repair    HYSTERECTOMY      INTUSSUSCEPTION REPAIR  2016    LEISA-EN-Y PROCEDURE  2011    last assessed: 10/5/16    THYROID SURGERY      THYROIDECTOMY  2016       Family History   Problem Relation Age of Onset    Lupus Mother         systemic erythematosus    Crohn's disease Mother     Diabetes Father     Liver disease Father     Rheum arthritis Maternal Grandmother      I have reviewed and agree with the history as documented  Social History   Substance Use Topics    Smoking status: Never Smoker    Smokeless tobacco: Never Used    Alcohol use Yes      Comment: occasional        Review of Systems   Constitutional: Negative for activity change, appetite change and fatigue  HENT: Negative for nosebleeds, sneezing, sore throat, trouble swallowing and voice change  Eyes: Negative for photophobia, pain and visual disturbance  Respiratory: Negative for apnea, choking and stridor  Cardiovascular: Negative for palpitations and leg swelling  Gastrointestinal: Negative for anal bleeding and constipation  Endocrine: Negative for cold intolerance, heat intolerance, polydipsia and polyphagia  Genitourinary: Negative for decreased urine volume, enuresis, frequency, genital sores and urgency  Musculoskeletal: Negative for joint swelling and myalgias  Allergic/Immunologic: Negative for environmental allergies and food allergies  Neurological: Negative for tremors, seizures, speech difficulty and weakness  Hematological: Negative for adenopathy  Psychiatric/Behavioral: Negative for behavioral problems, decreased concentration, dysphoric mood and hallucinations         Physical Exam  Physical Exam   Constitutional: She is oriented to person, place, and time  She appears well-developed and well-nourished  She is sleeping  She is easily aroused  No distress  HENT:   Head: Normocephalic and atraumatic  Right Ear: External ear normal    Left Ear: External ear normal    Nose: Nose normal    Mouth/Throat: Oropharynx is clear and moist    Eyes: Conjunctivae and EOM are normal  Pupils are equal, round, and reactive to light  Neck: Normal range of motion  Neck supple  Cardiovascular: Normal rate, regular rhythm and normal heart sounds  Exam reveals no gallop and no friction rub  No murmur heard  Pulmonary/Chest: Effort normal and breath sounds normal  No respiratory distress  She has no wheezes  Abdominal: Soft  Bowel sounds are normal    Neurological: She is oriented to person, place, and time and easily aroused  Skin: Skin is warm and dry  She is not diaphoretic  Psychiatric: She has a normal mood and affect  Her behavior is normal    Vitals reviewed        Vital Signs  ED Triage Vitals [09/02/18 1249]   Temperature Pulse Respirations Blood Pressure SpO2   97 7 °F (36 5 °C) 71 16 104/63 100 %      Temp Source Heart Rate Source Patient Position - Orthostatic VS BP Location FiO2 (%)   Tympanic Monitor Lying Left arm --      Pain Score       7           Vitals:    09/02/18 1249   BP: 104/63   Pulse: 71   Patient Position - Orthostatic VS: Lying       Visual Acuity  Visual Acuity      Most Recent Value   L Pupil Size (mm)  6   R Pupil Size (mm)  6          ED Medications  Medications   sodium chloride 0 9 % bolus 1,000 mL (0 mL Intravenous Stopped 9/2/18 1510)   iohexol (OMNIPAQUE) 350 MG/ML injection (MULTI-DOSE) 100 mL (100 mL Intravenous Given 9/2/18 1509)       Diagnostic Studies  Results Reviewed     Procedure Component Value Units Date/Time    Rapid drug screen, urine [[id number]]  (Abnormal) Collected:  09/02/18 1500    Lab Status:  Final result Specimen:  Urine from Urine, Clean Catch Updated:  09/02/18 1523     Amph/Meth UR Negative     Barbiturate Ur Negative     Benzodiazepine Urine Positive (A)     Cocaine Urine Negative     Methadone Urine Negative     Opiate Urine Negative     PCP Ur Negative     THC Urine Negative    Narrative:         Presumptive report  If requested, specimen will be sent to reference lab for confirmation  FOR MEDICAL PURPOSES ONLY  IF CONFIRMATION NEEDED PLEASE CONTACT THE LAB WITHIN 5 DAYS  Drug Screen Cutoff Levels:  AMPHETAMINE/METHAMPHETAMINES  1000 ng/mL  BARBITURATES     200 ng/mL  BENZODIAZEPINES     200 ng/mL  COCAINE      300 ng/mL  METHADONE      300 ng/mL  OPIATES      300 ng/mL  PHENCYCLIDINE     25 ng/mL  THC       50 ng/mL    UA w Reflex to Microscopic w Reflex to Culture [[id number]]  (Abnormal) Collected:  09/02/18 1500    Lab Status:  Final result Specimen:  Urine from Urine, Clean Catch Updated:  09/02/18 1509     Color, UA Yellow     Clarity, UA Clear     Specific Gravity, UA >=1 030     pH, UA 5 5     Leukocytes, UA Negative     Nitrite, UA Negative     Protein, UA Negative mg/dl      Glucose, UA Negative mg/dl      Ketones, UA Trace (A) mg/dl      Urobilinogen, UA 0 2 E U /dl      Bilirubin, UA Interference- unable to analyze (A)     Blood, UA Negative    Sedimentation rate, automated [[id number]]  (Normal) Collected:  09/02/18 1331    Lab Status:  Final result Specimen:  Blood from Arm, Right Updated:  09/02/18 1419     Sed Rate 11 mm/hour     TSH [[id number]]  (Abnormal) Collected:  09/02/18 1331    Lab Status:  Final result Specimen:  Blood from Arm, Right Updated:  09/02/18 1407     TSH 3RD GENERATON 0 019 (L) uIU/mL     Narrative:         Patients undergoing fluorescein dye angiography may retain small amounts of fluorescein in the body for 48-72 hours post procedure  Samples containing fluorescein can produce falsely depressed TSH values  If the patient had this procedure,a specimen should be resubmitted post fluorescein clearance            The recommended reference ranges for TSH during pregnancy are as follows:  First trimester 0 1 to 2 5 uIU/mL  Second trimester  0 2 to 3 0 uIU/mL  Third trimester 0 3 to 3 0 uIU/m      Troponin I [[id number]]  (Normal) Collected:  09/02/18 1331    Lab Status:  Final result Specimen:  Blood from Arm, Right Updated:  09/02/18 1401     Troponin I <0 02 ng/mL     Comprehensive metabolic panel [[id number]]  (Abnormal) Collected:  09/02/18 1331    Lab Status:  Final result Specimen:  Blood from Arm, Right Updated:  09/02/18 1359     Sodium 136 mmol/L      Potassium 3 3 (L) mmol/L      Chloride 104 mmol/L      CO2 25 mmol/L      ANION GAP 7 mmol/L      BUN 16 mg/dL      Creatinine 0 95 mg/dL      Glucose 82 mg/dL      Calcium 8 2 (L) mg/dL      AST 26 U/L      ALT 20 U/L      Alkaline Phosphatase 82 U/L      Total Protein 6 6 g/dL      Albumin 3 2 (L) g/dL      Total Bilirubin 0 20 mg/dL      eGFR 74 ml/min/1 73sq m     Narrative:         National Kidney Disease Education Program recommendations are as follows:  GFR calculation is accurate only with a steady state creatinine  Chronic Kidney disease less than 60 ml/min/1 73 sq  meters  Kidney failure less than 15 ml/min/1 73 sq  meters      Magnesium [[id number]]  (Normal) Collected:  09/02/18 1331    Lab Status:  Final result Specimen:  Blood from Arm, Right Updated:  09/02/18 1359     Magnesium 1 7 mg/dL     CBC and differential [[id number]]  (Abnormal) Collected:  09/02/18 1331    Lab Status:  Final result Specimen:  Blood from Arm, Right Updated:  09/02/18 1342     WBC 10 82 (H) Thousand/uL      RBC 4 17 Million/uL      Hemoglobin 12 4 g/dL      Hematocrit 39 4 %      MCV 95 fL      MCH 29 7 pg      MCHC 31 5 g/dL      RDW 12 4 %      MPV 10 2 fL      Platelets 143 Thousands/uL      nRBC 0 /100 WBCs      Neutrophils Relative 71 %      Immat GRANS % 0 %      Lymphocytes Relative 21 %      Monocytes Relative 6 %      Eosinophils Relative 2 %      Basophils Relative 0 %      Neutrophils Absolute 7 64 (H) Thousands/µL      Immature Grans Absolute 0 04 Thousand/uL      Lymphocytes Absolute 2 30 Thousands/µL      Monocytes Absolute 0 65 Thousand/µL      Eosinophils Absolute 0 17 Thousand/µL      Basophils Absolute 0 02 Thousands/µL     Topiramate level [[id number]] Collected:  09/02/18 1331    Lab Status: In process Specimen:  Blood from Arm, Right Updated:  09/02/18 1339    Fingerstick Glucose (POCT) [[id number]]  (Normal) Collected:  09/02/18 1240    Lab Status:  Final result Updated:  09/02/18 1242     POC Glucose 72 mg/dl                  CT chest abdomen pelvis w contrast   Final Result by Shawnee Azevedo MD (09/02 1529)      No posttraumatic abnormality identified in the chest, abdomen or pelvis  More solid appearance of the residual thymic tissue in the anterior mediastinum measuring 2 4 cm  Thymoma not excluded  Thoracic surgery consultation recommended  Complex left ovarian cyst measuring 3 3 x 3 0 cm  Nonemergent ultrasound correlation recommended  Workstation performed: [id number]         CT spine cervical without contrast   Final Result by Shawnee Azevedo MD (09/02 1521)      No cervical spine fracture or traumatic malalignment  Workstation performed: [id number]         CT head without contrast   Final Result by Shawnee Azevedo MD (09/02 1519)      No acute intracranial abnormality                    Workstation performed: [id number]                    Procedures  ECG 12 Lead Documentation  Date/Time: 9/2/2018 2:48 PM  Performed by: Bri Huff by: Luis Eduardo Urbano     Rate:     ECG rate:  72    ECG rate assessment: normal    Rhythm:     Rhythm: sinus rhythm    QRS:     QRS axis:  Normal  Conduction:     Conduction: normal    ST segments:     ST segments:  Normal  T waves:     T waves: normal             Phone Contacts  ED Phone Contact    ED Course                               [facility]  CritCare Time    Disposition  Final diagnoses:   Syncope     Time reflects when diagnosis was documented in both MDM as applicable and the Disposition within this note     Time User Action Codes Description Comment    9/2/2018  4:00 PM Pelon Villalobos Add [R55] Syncope       ED Disposition     ED Disposition Condition Comment    Discharge    Patient condition at discharge was stable  Follow-up Information     Follow up With Specialties Details Why [address] [facility], [address] [facility], Nurse Practitioner Schedule an appointment as soon as possible for a visit  [address] [facility]      Efrem Roman MD Thoracic Surgery, Thoracic Diseases, Cardiothoracic Surgery Schedule an appointment as soon as possible for a visit  [address] [facility]  [address] [facility] [facility]  [phone]            Patient's Medications   Discharge Prescriptions    No medications on file     No discharge procedures on file      ED Provider  Electronically Signed by           Wanetta Sandifer, PA-C  09/02/18 4112

## 2018-09-02 NOTE — ED NOTES
Pt sts she is itchy from the adhesive on the electrodes  Requested Benadryl  Spoke with Radha Wayne [facility] to remove electrode patches and from the monitor        Nae Miller RN  09/02/18 2050

## 2018-09-02 NOTE — ED NOTES
Pt sts she had a hysterectomy  Therefore no need for pregnancy test   Informed KAYLA Baker  D/c'd order  Informed pt that a urine sanple was still going to be required to check for drugs and infection        Nae Miller RN  09/02/18 2404

## 2018-09-02 NOTE — DISCHARGE INSTRUCTIONS
Syncope   AMBULATORY CARE:   Syncope  is also called fainting or passing out  Syncope is a sudden, temporary loss of consciousness, followed by a fall from a standing or sitting position  Syncope ranges from not serious to a sign of a more serious condition that needs to be treated  You can control some health conditions that cause syncope  Your healthcare providers can help you create a plan to manage syncope and prevent episodes  Signs and symptoms that may occur before syncope include the following:   · Cold, clammy, and sweaty skin     · Fast breathing and a racing, pounding heartbeat     · Feeling more tired than usual     · Nausea, a warm feeling, and sweating    · A headache, or feeling lightheaded or dizzy    · Tingling sensation or numbness     · Spots in front of your eyes, blurred vision, or double vision  Seek care immediately if:   · You are bleeding because you hit your head when you fainted  · You suddenly have double vision, difficulty speaking, numbness, and cannot move your arms or legs  · You have chest pain and trouble breathing  · You vomit blood or material that looks like coffee grounds  · You see blood in your bowel movement  Contact your healthcare provider if:   · You have new or worsening symptoms  · You have another syncope episode  · You have a headache, fast heartbeat, or feel too dizzy to stand up  · You have questions or concerns about your condition or care  Treatment for syncope  depends on the cause of your syncope  To prevent syncope from happening again, you may  need any of the following:  · Medicines  may be needed to treat any medical conditions that are causing your syncope  These may include medicines to help your heart pump strongly and regularly  Your healthcare provider may also make changes to any medicines that are causing syncope  · Tilt training  involves training yourself to stand for 10 to 30 minutes each day against a wall   This helps your body decrease the effects of posture changes and reduces the number of fainting spells  Manage syncope:   · Keep a record of your syncope episodes  Include your symptoms and your activity before and after the episode  The record can help your healthcare provider find the cause of your syncope and help you manage episodes  · Sit or lie down when needed  This includes when you feel dizzy, your throat is getting tight, and your vision changes  Raise your legs above the level of your heart  · Take slow, deep breaths if you start to breathe faster with anxiety or fear  This can help decrease dizziness and the feeling that you might faint  · Check your blood pressure often  This is important if you take medicine to lower your blood pressure  Check your blood pressure when you are lying down and when you are standing  Ask how often to check during the day  Keep a record of your blood pressure numbers  Your healthcare provider may use the record to help plan your treatment  Prevent a syncope episode:   · Move slowly and let yourself get used to one position before you move to another position  This is very important when you change from a lying or sitting position to a standing position  Take some deep breaths before you stand up from a lying position  Stand up slowly  Sudden movements may cause a fainting spell  Sit on the side of the bed or couch for a few minutes before you stand up  If you are on bedrest, try to be upright for about 2 hours each day, or as directed  Do not lock your legs if you are standing for a long period of time  Move your legs and bend your knees to keep blood flowing  · Follow your healthcare provider's recommendations  Your provider may  recommend that you drink more liquids to prevent dehydration  You may also need to have more salt to keep your blood pressure from dropping too low and causing syncope   Your provider will tell you how much liquid and sodium to have each day  · Watch for signs of low blood sugar  These include hunger, nervousness, sweating, and fast or fluttery heartbeats  Talk with your healthcare provider about ways to keep your blood sugar level steady  · Do not strain if you are constipated  You may faint if you strain to have a bowel movement  Walking is the best way to get your bowels moving  Eat foods high in fiber to make it easier to have a bowel movement  Good examples are high-fiber cereals, beans, vegetables, and whole-grain breads  Prune juice may help make bowel movements softer  · Be careful in hot weather  Heat can cause a syncope episode  Limit activity done outside on hot days  Physical activity in hot weather can lead to dehydration  This can cause an episode  Follow up with your healthcare provider as directed:  Write down your questions so you remember to ask them during your visits  © 2017 [employer] [employer]  Information is for End User's use only and may not be sold, redistributed or otherwise used for commercial purposes  All illustrations and images included in CareNotes® are the copyrighted property of A D [employer] M [employer] Inc  or [employer]  The above information is an  only  It is not intended as medical advice for individual conditions or treatments  Talk to your doctor, nurse or pharmacist before following any medical regimen to see if it is safe and effective for you

## 2018-09-03 LAB
ATRIAL RATE: 72 BPM
P AXIS: 74 DEGREES
PR INTERVAL: 142 MS
QRS AXIS: 20 DEGREES
QRSD INTERVAL: 90 MS
QT INTERVAL: 404 MS
QTC INTERVAL: 442 MS
T WAVE AXIS: 26 DEGREES
VENTRICULAR RATE: 72 BPM

## 2018-09-03 PROCEDURE — 93010 ELECTROCARDIOGRAM REPORT: CPT | Performed by: INTERNAL MEDICINE

## 2018-09-04 ENCOUNTER — OFFICE VISIT (OUTPATIENT)
Dept: FAMILY MEDICINE CLINIC | Facility: CLINIC | Age: 43
End: 2018-09-04
Payer: COMMERCIAL

## 2018-09-04 VITALS
RESPIRATION RATE: 14 BRPM | HEART RATE: 88 BPM | DIASTOLIC BLOOD PRESSURE: 68 MMHG | HEIGHT: 65 IN | TEMPERATURE: 98.7 F | SYSTOLIC BLOOD PRESSURE: 110 MMHG

## 2018-09-04 DIAGNOSIS — D49.89: Primary | ICD-10-CM

## 2018-09-04 PROCEDURE — 99213 OFFICE O/P EST LOW 20 MIN: CPT | Performed by: NURSE PRACTITIONER

## 2018-09-04 RX ORDER — GABAPENTIN 300 MG/1
300 CAPSULE ORAL 2 TIMES DAILY
COMMUNITY
End: 2019-04-10 | Stop reason: HOSPADM

## 2018-09-04 NOTE — PROGRESS NOTES
Assessment/Plan:    Problem List Items Addressed This Visit     Neoplasm of thymus - Primary    Relevant Orders    Ambulatory referral to Thoracic Surgery          Patient Instructions   Rto after w/u with surg to discuss findings, plan      Return in about 4 weeks (around 10/2/2018)  Subjective:      Patient ID: Destiny Arenas is a 37 y o  female  Chief Complaint   Patient presents with    ER FOLLOW UP     neck pain, passed out next day from medication that was given for pain         Here to discuss incidental finding: thymus mass on CT scan of chest  H/o thyroid cancer with thyroidectomy  Sees Dr Malu Arroyo, endocrine  Was seen in [facility] [place] [facility] for cervicalgia on 9/1/18 and 9/2/18 after syncopal episode after taking valium  Records reviewed    Needs referral to thoracic surg        The following portions of the patient's history were reviewed and updated as appropriate: allergies, current medications, past family history, past medical history, past social history, past surgical history and problem list     Review of Systems   Constitutional: Positive for fatigue  Negative for fever and unexpected weight change  Cardiovascular: Negative  Neurological: Negative            Current Outpatient Prescriptions   Medication Sig Dispense Refill    albuterol (2 5 mg/3 mL) 0 083 % nebulizer solution Take 2 5 mg by nebulization every 6 (six) hours as needed for wheezing      amitriptyline (ELAVIL) 75 mg tablet Take 1 tablet (75 mg total) by mouth daily at bedtime 30 tablet 3    buPROPion (WELLBUTRIN XL) 300 mg 24 hr tablet Take 300 mg by mouth every morning      busPIRone (BUSPAR) 10 mg tablet Take 10 mg by mouth 3 (three) times a day      calcitriol (ROCALTROL) 0 5 MCG capsule Take 0 5 mcg by mouth 2 (two) times a day        Calcium Carb-Cholecalciferol (CALCIUM 600 + D PO) 3 tablets 4 times Daily       cetirizine (ZyrTEC) 10 mg tablet TAKE ONE TABLET BY MOUTH EVERY DAY AS NEEDED (Patient taking differently: TAKE ONE TABLET BY MOUTH EVERY DAY) 30 tablet 6    Cholecalciferol (VITAMIN D PO) Take 50,000 Units by mouth once a week      clonazePAM (KlonoPIN) 1 mg tablet Take 1 mg by mouth daily at bedtime        Cyanocobalamin (VITAMIN B 12 PO) Take 1,000 mcg by mouth daily      cyclobenzaprine (FLEXERIL) 5 mg tablet TAKE ONE TABLET BY MOUTH EVERY DAY AT BEDTIME AS NEEDED FOR MUSCLE SPASMS 30 tablet 3    desvenlafaxine succinate (PRISTIQ) 50 mg 24 hr tablet Take 50 mg by mouth daily      eletriptan (RELPAX) 40 MG tablet Take by mouth      folic acid (FOLVITE) 1 mg tablet TAKE ONE TABLET BY MOUTH EVERY DAY 30 tablet 6    gabapentin (NEURONTIN) 300 mg capsule Take 300 mg by mouth 2 (two) times a day      gabapentin (NEURONTIN) 600 MG tablet Take 300 mg by mouth daily at bedtime      levothyroxine 175 mcg tablet Take 175 mcg by mouth Five days a week, mon- fri       magnesium oxide (MAG-OX) 400 mg Take 400 mg by mouth 3 (three) times a day      montelukast (SINGULAIR) 10 mg tablet TAKE ONE TABLET BY MOUTH EVERY DAY AS DIRECTED 30 tablet 6    Multiple Vitamin (MULTIVITAMIN) tablet Take 1 tablet by mouth daily      omeprazole (PriLOSEC) 40 MG capsule TAKE ONE CAPSULE BY MOUTH EVERY DAY 30 capsule 6    ondansetron (ZOFRAN) 4 mg tablet Take 1 tablet by mouth every 8 (eight) hours as needed for nausea or vomiting for up to 6 doses 6 tablet 0    predniSONE 20 mg tablet Take 1 tablet (20 mg total) by mouth 2 (two) times a day with meals for 5 days 10 tablet 0    SM ASPIRIN ADULT LOW STRENGTH 81 MG EC tablet TAKE ONE TABLET BY MOUTH EVERY DAY AS DIRECTED 30 tablet 3    sucralfate (CARAFATE) 1 g/10 mL suspension Take 1 g by mouth as needed        topiramate (TOPAMAX) 50 MG tablet TAKE ONE TABLET BY MOUTH EVERY DAY AT BEDTIME 30 tablet 5    traMADol-acetaminophen (ULTRACET) 37 5-325 mg per tablet Take 1 tablet by mouth every 12 (twelve) hours as needed for moderate pain 30 tablet 2    Zolpidem Tartrate (AMBIEN PO) Take 10 mg by mouth daily at bedtime       No current facility-administered medications for this visit  Objective:    /68 (BP Location: Left arm, Patient Position: Sitting, Cuff Size: Adult)   Pulse 88   Temp 98 7 °F (37 1 °C) (Temporal)   Resp 14   Ht 5' 5" (1 651 m)   LMP 09/06/2017        Physical Exam   Constitutional: She is oriented to person, place, and time  Vital signs are normal  She appears well-developed and well-nourished  No distress  Cardiovascular: Normal rate, regular rhythm, normal heart sounds and intact distal pulses  Pulmonary/Chest: Effort normal and breath sounds normal    Neurological: She is alert and oriented to person, place, and time  Skin: Skin is warm and dry  Psychiatric: She has a normal mood and affect  Nursing note and vitals reviewed               Brent Demarco, [facility]

## 2018-09-05 ENCOUNTER — TELEPHONE (OUTPATIENT)
Dept: FAMILY MEDICINE CLINIC | Facility: CLINIC | Age: 43
End: 2018-09-05

## 2018-09-05 LAB — TOPIRAMATE SERPL-MCNC: 2.4 UG/ML (ref 2–25)

## 2018-09-07 DIAGNOSIS — M79.7 FIBROMYALGIA: ICD-10-CM

## 2018-09-07 RX ORDER — GABAPENTIN 300 MG/1
CAPSULE ORAL
Qty: 120 CAPSULE | Refills: 6 | Status: SHIPPED | OUTPATIENT
Start: 2018-09-07 | End: 2019-03-17 | Stop reason: SDUPTHER

## 2018-09-13 DIAGNOSIS — G89.29 CHRONIC BILATERAL LOW BACK PAIN WITHOUT SCIATICA: ICD-10-CM

## 2018-09-13 DIAGNOSIS — M79.7 FIBROMYALGIA: ICD-10-CM

## 2018-09-13 DIAGNOSIS — M54.50 CHRONIC BILATERAL LOW BACK PAIN WITHOUT SCIATICA: ICD-10-CM

## 2018-09-13 NOTE — TELEPHONE ENCOUNTER
Narinder Foreman    Patient says tramadol meds were filled on 8/28  She is supposed to take 1 pill every 12 hours, so the quantity should be 60, but it was written for 30 quantity  She started taking 1 x daily, so she has a few left, but this is not helping with fibromyalgia pain as she is only taking 1  Please send new rx to [facility] , quantity 60

## 2018-09-20 ENCOUNTER — TELEPHONE (OUTPATIENT)
Dept: FAMILY MEDICINE CLINIC | Facility: CLINIC | Age: 43
End: 2018-09-20

## 2018-09-20 DIAGNOSIS — J30.1 SEASONAL ALLERGIC RHINITIS DUE TO POLLEN: Primary | ICD-10-CM

## 2018-09-20 DIAGNOSIS — J45.20 ASTHMA, ALLERGIC, MILD INTERMITTENT, UNCOMPLICATED: ICD-10-CM

## 2018-09-20 RX ORDER — ALBUTEROL SULFATE 90 UG/1
2 AEROSOL, METERED RESPIRATORY (INHALATION) EVERY 6 HOURS PRN
Qty: 18 G | Refills: 0 | Status: SHIPPED | OUTPATIENT
Start: 2018-09-20 | End: 2019-03-07 | Stop reason: SDUPTHER

## 2018-09-20 RX ORDER — FLUTICASONE PROPIONATE 50 MCG
2 SPRAY, SUSPENSION (ML) NASAL DAILY
Qty: 16 G | Refills: 0 | Status: SHIPPED | OUTPATIENT
Start: 2018-09-20 | End: 2019-03-07 | Stop reason: SDUPTHER

## 2018-09-21 DIAGNOSIS — J30.2 CHRONIC SEASONAL ALLERGIC RHINITIS: ICD-10-CM

## 2018-09-21 DIAGNOSIS — Z98.84 S/P GASTRIC BYPASS: ICD-10-CM

## 2018-09-21 RX ORDER — FOLIC ACID 1 MG/1
TABLET ORAL
Qty: 30 TABLET | Refills: 6 | Status: SHIPPED | OUTPATIENT
Start: 2018-09-21 | End: 2018-10-08 | Stop reason: SDUPTHER

## 2018-09-21 RX ORDER — CETIRIZINE HYDROCHLORIDE 10 MG/1
TABLET ORAL
Qty: 30 TABLET | Refills: 6 | Status: SHIPPED | OUTPATIENT
Start: 2018-09-21 | End: 2018-10-08 | Stop reason: SDUPTHER

## 2018-09-23 DIAGNOSIS — G89.29 CHRONIC BILATERAL LOW BACK PAIN WITHOUT SCIATICA: ICD-10-CM

## 2018-09-23 DIAGNOSIS — M79.7 FIBROMYALGIA: ICD-10-CM

## 2018-09-23 DIAGNOSIS — M54.50 CHRONIC BILATERAL LOW BACK PAIN WITHOUT SCIATICA: ICD-10-CM

## 2018-09-23 RX ORDER — CYCLOBENZAPRINE HCL 5 MG
TABLET ORAL
Qty: 30 TABLET | Refills: 3 | Status: SHIPPED | OUTPATIENT
Start: 2018-09-23 | End: 2019-01-07 | Stop reason: SDUPTHER

## 2018-09-28 DIAGNOSIS — M54.50 CHRONIC BILATERAL LOW BACK PAIN WITHOUT SCIATICA: ICD-10-CM

## 2018-09-28 DIAGNOSIS — G89.29 CHRONIC BILATERAL LOW BACK PAIN WITHOUT SCIATICA: ICD-10-CM

## 2018-09-28 DIAGNOSIS — M79.7 FIBROMYALGIA: ICD-10-CM

## 2018-10-01 PROBLEM — E32.0 THYMUS HYPERPLASIA (HCC): Status: ACTIVE | Noted: 2018-09-20

## 2018-10-08 ENCOUNTER — OFFICE VISIT (OUTPATIENT)
Dept: URGENT CARE | Facility: CLINIC | Age: 43
End: 2018-10-08
Payer: COMMERCIAL

## 2018-10-08 VITALS
BODY MASS INDEX: 30.41 KG/M2 | RESPIRATION RATE: 16 BRPM | OXYGEN SATURATION: 100 % | HEART RATE: 83 BPM | HEIGHT: 66 IN | TEMPERATURE: 98.8 F | WEIGHT: 189.2 LBS | SYSTOLIC BLOOD PRESSURE: 112 MMHG | DIASTOLIC BLOOD PRESSURE: 76 MMHG

## 2018-10-08 DIAGNOSIS — H65.02 ACUTE SEROUS OTITIS MEDIA OF LEFT EAR, RECURRENCE NOT SPECIFIED: Primary | ICD-10-CM

## 2018-10-08 PROCEDURE — 99213 OFFICE O/P EST LOW 20 MIN: CPT | Performed by: PHYSICIAN ASSISTANT

## 2018-10-08 RX ORDER — BLOOD SUGAR DIAGNOSTIC
STRIP MISCELLANEOUS
COMMUNITY
Start: 2018-09-06

## 2018-10-08 RX ORDER — CLONAZEPAM 2 MG/1
TABLET ORAL
COMMUNITY
Start: 2018-08-10 | End: 2018-10-08 | Stop reason: ALTCHOICE

## 2018-10-08 RX ORDER — AMOXICILLIN 875 MG/1
875 TABLET, COATED ORAL 2 TIMES DAILY
Qty: 20 TABLET | Refills: 0 | Status: SHIPPED | OUTPATIENT
Start: 2018-10-08 | End: 2018-10-18

## 2018-10-08 NOTE — LETTER
October 8, 2018     Patient: Tin Guerrero   YOB: 1975   Date of Visit: 10/8/2018       To Whom It May Concern: It is my medical opinion that Tin Guerrero may return to work on 10/9/2018  If you have any questions or concerns, please don't hesitate to call           Sincerely,      Sarita Abebe PA-C

## 2018-10-08 NOTE — PROGRESS NOTES
[facility]        NAME: Santos Barnhart is a 37 y o  female  : 1975    MRN: [id number]  DATE: 2018  TIME: 1:08 PM    Assessment and Plan   Acute serous otitis media of left ear, recurrence not specified [H65 02]  1  Acute serous otitis media of left ear, recurrence not specified  amoxicillin (AMOXIL) 875 mg tablet     Patient Instructions     Take antibiotic as directed with food and water  While on this medication begin a probiotic such as yogurt  Apply a warm compress to your ear for discomfort relief  Motrin or Tylenol may be used for fever or discomfort  Due to bulging of the eardrum it is possible that your ear drum will spontaneously perforate and cause draining  This is normal but it is recommended that you do not use any Q-Tips, hydrogen peroxide, or other ear cleaning or pain relieving solvents until evaluated by your family doctor  Follow up with your family doctor in 3-5 days  Proceed to the ER if symptoms worsen  Chief Complaint     Chief Complaint   Patient presents with    Earache     Pt here for left ear pain today,  pt states it sounds  like it has water in it and she has pain down her neck  Pain 8/10  Pt used Tylenol  History of Present Illness     25-year-old female presenting with complaint of left ear pain x1 day  Patient notes she awoke today with a severe ear pain at roughly 0200 hours  She noted discomfort and difficulty falling back asleep secondary to severity of pain  Pain progressed into this morning and is currently worse than at onset  She is concerned for infection  She notes muffled sounds from her left ear but otherwise denies any change in hearing, popping noise or sensation, or loss of hearing  She notes chronic tinnitus but no change in this  She denies any other URI or GI symptoms  No treatments tried  No sick contacts or recent travel        Review of Systems   Review of Systems   Constitutional: Negative for appetite change, chills, diaphoresis, fatigue and fever  HENT: Negative for congestion, ear discharge, postnasal drip, sore throat and trouble swallowing  Respiratory: Negative for cough, shortness of breath and wheezing  Cardiovascular: Negative for chest pain and palpitations  Gastrointestinal: Negative for abdominal pain, diarrhea, nausea and vomiting  Musculoskeletal: Negative for arthralgias and myalgias  Skin: Negative for rash       Current Medications       Current Outpatient Prescriptions:     albuterol (2 5 mg/3 mL) 0 083 % nebulizer solution, Take 2 5 mg by nebulization every 6 (six) hours as needed for wheezing, Disp: , Rfl:     albuterol (VENTOLIN HFA) 90 mcg/act inhaler, Inhale 2 puffs every 6 (six) hours as needed for wheezing, Disp: 18 g, Rfl: 0    amitriptyline (ELAVIL) 75 mg tablet, Take 1 tablet (75 mg total) by mouth daily at bedtime, Disp: 30 tablet, Rfl: 3    buPROPion (WELLBUTRIN XL) 300 mg 24 hr tablet, Take 300 mg by mouth every morning, Disp: , Rfl:     busPIRone (BUSPAR) 10 mg tablet, Take 10 mg by mouth 3 (three) times a day, Disp: , Rfl:     calcitriol (ROCALTROL) 0 5 MCG capsule, Take 0 5 mcg by mouth 2 (two) times a day  , Disp: , Rfl:     Calcium Carb-Cholecalciferol (CALCIUM 600 + D PO), 3 tablets 4 times Daily , Disp: , Rfl:     cetirizine (ZyrTEC) 10 mg tablet, TAKE ONE TABLET BY MOUTH EVERY DAY AS NEEDED (Patient taking differently: TAKE ONE TABLET BY MOUTH EVERY DAY), Disp: 30 tablet, Rfl: 6    Cholecalciferol (VITAMIN D PO), Take 50,000 Units by mouth once a week, Disp: , Rfl:     clonazePAM (KlonoPIN) 1 mg tablet, Take 1 mg by mouth daily at bedtime  , Disp: , Rfl:     Cyanocobalamin (VITAMIN B 12 PO), Take 1,000 mcg by mouth daily, Disp: , Rfl:     cyclobenzaprine (FLEXERIL) 5 mg tablet, TAKE ONE TABLET BY MOUTH EVERY DAY AT BEDTIME AS NEEDED FOR MUSCLE SPASMS (GENERIC FLEXERIL), Disp: 30 tablet, Rfl: 3    desvenlafaxine succinate (PRISTIQ) 50 mg 24 hr tablet, Take 50 mg by mouth daily, Disp: , Rfl:     eletriptan (RELPAX) 40 MG tablet, Take by mouth, Disp: , Rfl:     fluticasone (FLONASE) 50 mcg/act nasal spray, 2 sprays into each nostril daily, Disp: 16 g, Rfl: 0    folic acid (FOLVITE) 1 mg tablet, TAKE ONE TABLET BY MOUTH EVERY DAY, Disp: 30 tablet, Rfl: 6    gabapentin (NEURONTIN) 300 mg capsule, Take 300 mg by mouth 2 (two) times a day, Disp: , Rfl:     gabapentin (NEURONTIN) 600 MG tablet, Take 300 mg by mouth daily at bedtime, Disp: , Rfl:     levothyroxine 175 mcg tablet, Take 175 mcg by mouth Five days a week, mon- fri , Disp: , Rfl:     magnesium oxide (MAG-OX) 400 mg, Take 400 mg by mouth 3 (three) times a day, Disp: , Rfl:     montelukast (SINGULAIR) 10 mg tablet, TAKE ONE TABLET BY MOUTH EVERY DAY AS DIRECTED, Disp: 30 tablet, Rfl: 6    Multiple Vitamin (MULTIVITAMIN) tablet, Take 1 tablet by mouth daily, Disp: , Rfl:     omeprazole (PriLOSEC) 40 MG capsule, TAKE ONE CAPSULE BY MOUTH EVERY DAY, Disp: 30 capsule, Rfl: 6    ondansetron (ZOFRAN) 4 mg tablet, Take 1 tablet by mouth every 8 (eight) hours as needed for nausea or vomiting for up to 6 doses, Disp: 6 tablet, Rfl: 0    ONETOUCH VERIO test strip, , Disp: , Rfl:     SM ASPIRIN ADULT LOW STRENGTH 81 MG EC tablet, TAKE ONE TABLET BY MOUTH EVERY DAY AS DIRECTED, Disp: 30 tablet, Rfl: 3    sucralfate (CARAFATE) 1 g/10 mL suspension, Take 1 g by mouth as needed  , Disp: , Rfl:     topiramate (TOPAMAX) 50 MG tablet, TAKE ONE TABLET BY MOUTH EVERY DAY AT BEDTIME, Disp: 30 tablet, Rfl: 5    traMADol-acetaminophen (ULTRACET) 37 5-325 mg per tablet, TAKE ONE TABLET BY MOUTH EVERY 12 HOURS AS NEEDED FOR MODERATE PAIN, Disp: 30 tablet, Rfl: 0    Zolpidem Tartrate (AMBIEN PO), Take 10 mg by mouth daily at bedtime, Disp: , Rfl:     amoxicillin (AMOXIL) 875 mg tablet, Take 1 tablet (875 mg total) by mouth 2 (two) times a day for 10 days, Disp: 20 tablet, Rfl: 0    Current Allergies     Allergies as of 10/08/2018 - Reviewed 10/08/2018   Allergen Reaction Noted    Compazine [prochlorperazine edisylate] Anxiety 10/01/2016    Demerol [meperidine] Other (See Comments) and Hallucinations 10/01/2016    Medical tape Rash 04/06/2017    Morphine  04/06/2017    Prochlorperazine Anxiety 10/05/2016    Valium [diazepam] Syncope 09/04/2018          The following portions of the patient's history were reviewed and updated as appropriate: allergies, current medications, past family history, past medical history, past social history, past surgical history and problem list      Past Medical History:   Diagnosis Date    Asthma     Cancer ([facility] [address] )     thyroid    Cervical adenopathy     Concussion without loss of consciousness     Depression     Disease of thyroid gland     Esophageal reflux     Frequent headaches     Gastritis     Hypocalcemia     Hypoglycemia     Malignant neoplasm of thyroid gland ([facility] [address] )     Migraine     Myositis of multiple sites     Psychiatric disorder     depression    Sinus headache     Worsening headaches        Past Surgical History:   Procedure Laterality Date    ABDOMINAL SURGERY  2011    gastric bypass    CHOLECYSTECTOMY  1993    lap    COLONOSCOPY N/A 12/12/2016    Procedure: COLONOSCOPY;  Surgeon: Evelyn Casas MD;  Location: [place] [address] GI LAB; Service:     ENDOMETRIAL BIOPSY      last assessed: 7/26/17    ESOPHAGOGASTRODUODENOSCOPY N/A 12/12/2016    Procedure: ESOPHAGOGASTRODUODENOSCOPY (EGD); Surgeon: Evelyn Casas MD;  Location: [facility];   Service:     ESSURE TUBAL LIGATION      GASTRIC BYPASS      HERNIA REPAIR      hiatel hernia repair    HYSTERECTOMY      INTUSSUSCEPTION REPAIR  2016    LEISA-EN-Y PROCEDURE  2011    last assessed: 10/5/16    THYROID SURGERY      THYROIDECTOMY  2016       Family History   Problem Relation Age of Onset    Lupus Mother         systemic erythematosus    Crohn's disease Mother     Diabetes Father     Liver disease Father    Charlotte Dominguezs Rheum arthritis Maternal Grandmother          Medications have been verified  Objective   /76 (BP Location: Right arm, Patient Position: Sitting, Cuff Size: Standard)   Pulse 83   Temp 98 8 °F (37 1 °C) (Tympanic)   Resp 16   Ht 5' 6" (1 676 m)   Wt 85 8 kg (189 lb 3 2 oz)   LMP 09/06/2017   SpO2 100%   BMI 30 54 kg/m²        Physical Exam     Physical Exam   Constitutional: She is oriented to person, place, and time  She appears well-developed and well-nourished  No distress  HENT:   Head: Normocephalic and atraumatic  Right Ear: Hearing, external ear and ear canal normal  A middle ear effusion is present  Left Ear: Hearing, external ear and ear canal normal  No drainage or tenderness  Tympanic membrane is erythematous and bulging  Tympanic membrane is not scarred, not perforated and not retracted  A middle ear effusion is present  Mouth/Throat: Oropharynx is clear and moist  No oropharyngeal exudate  Eyes: Conjunctivae are normal    Neck: Neck supple  Cardiovascular: Normal rate, regular rhythm and normal heart sounds  Pulmonary/Chest: Effort normal and breath sounds normal  No respiratory distress  She has no decreased breath sounds  She has no wheezes  She has no rhonchi  She has no rales  Lymphadenopathy:     She has cervical adenopathy (  Tender left posterior nodes  )  Neurological: She is alert and oriented to person, place, and time  No cranial nerve deficit  She exhibits normal muscle tone  Coordination normal    Skin: Skin is warm and dry  No rash noted  She is not diaphoretic  Psychiatric: She has a normal mood and affect  Her behavior is normal    Nursing note and vitals reviewed

## 2018-10-08 NOTE — PATIENT INSTRUCTIONS
Take antibiotic as directed with food and water  While on this medication begin a probiotic such as yogurt  Apply a warm compress to your ear for discomfort relief  Motrin or Tylenol may be used for fever or discomfort  Due to bulging of the eardrum it is possible that your ear drum will spontaneously perforate and cause draining  This is normal but it is recommended that you do not use any Q-Tips, hydrogen peroxide, or other ear cleaning or pain relieving solvents until evaluated by your family doctor  Follow up with your family doctor in 3-5 days  Proceed to the ER if symptoms worsen

## 2018-10-12 ENCOUNTER — OFFICE VISIT (OUTPATIENT)
Dept: FAMILY MEDICINE CLINIC | Facility: CLINIC | Age: 43
End: 2018-10-12
Payer: COMMERCIAL

## 2018-10-12 VITALS
SYSTOLIC BLOOD PRESSURE: 102 MMHG | WEIGHT: 189 LBS | BODY MASS INDEX: 30.51 KG/M2 | TEMPERATURE: 98.1 F | DIASTOLIC BLOOD PRESSURE: 70 MMHG | RESPIRATION RATE: 12 BRPM | HEART RATE: 84 BPM

## 2018-10-12 DIAGNOSIS — B34.9 PHARYNGITIS WITH VIRAL SYNDROME: ICD-10-CM

## 2018-10-12 DIAGNOSIS — H91.8X2 OTHER SPECIFIED HEARING LOSS OF LEFT EAR, UNSPECIFIED HEARING STATUS ON CONTRALATERAL SIDE: ICD-10-CM

## 2018-10-12 DIAGNOSIS — H66.92 OTITIS MEDIA OF LEFT EAR FOLLOW-UP, NOT RESOLVED: Primary | ICD-10-CM

## 2018-10-12 DIAGNOSIS — J02.9 PHARYNGITIS WITH VIRAL SYNDROME: ICD-10-CM

## 2018-10-12 PROCEDURE — 99213 OFFICE O/P EST LOW 20 MIN: CPT | Performed by: NURSE PRACTITIONER

## 2018-10-12 NOTE — PROGRESS NOTES
Assessment:      Acute left otitis media      Plan:      1  Treatment:  Amoxicillin-finish course given by [facility]  2  OTC analgesics, fluids, rest    3   Follow up with PCP in 1 week if not improving  4  Referral to ENT for TM check, r/o perforation, and hearing testing    Subjective:       Harleen Flowers is a 37 y o  female who presents for evaluation of sore throat, L ear pain & hearing loss  Symptoms include left ear pain, plugged sensation in the left ear and sore throat  Onset of symptoms was 5 days ago, ear pain is improving, but ability to hear out of that ear has decreased and she has developed a sore throat since that time  Associated symptoms include sore throat, which have been present for 1 day   She is drinking plenty of fluids  She denies fever/chills, sinus pressure, nasal congestion  Sick contact at work  The following portions of the patient's history were reviewed and updated as appropriate: allergies, current medications, past family history, past medical history, past social history, past surgical history and problem list     Review of Systems  Pertinent items are noted in HPI        Objective:      /70 (BP Location: Left arm, Patient Position: Sitting, Cuff Size: Standard)   Pulse 84   Temp 98 1 °F (36 7 °C) (Temporal)   Resp 12   Wt 85 7 kg (189 lb)   LMP 09/06/2017   BMI 30 51 kg/m²   General appearance: alert and oriented, in no acute distress  Ears: abnormal TM left ear - dried bloody drainage collected in ear canal, unable to visualize TM  Nose: no sinus tenderness, mild nasal mucosa erythema  Throat: normal findings: oropharynx pink & moist without lesions or evidence of thrush  Lungs: clear to auscultation bilaterally  Heart: regular rate and rhythm, S1, S2 normal, no murmur, click, rub or gallop  Lymph nodes: Cervical adenopathy: L preauricular nodes

## 2018-10-12 NOTE — LETTER
October 12, 2018     Patient: Nasrin Albrecht   YOB: 1975   Date of Visit: 10/12/2018       To Whom it May Concern:    Nasrin Albrecht is under my professional care  She was seen in my office on 10/12/2018  She may return to work on 10/15/18  If you have any questions or concerns, please don't hesitate to call           Sincerely,          DARIAN Louis        CC: No Recipients

## 2018-10-12 NOTE — PATIENT INSTRUCTIONS
Ruptured Eardrum   WHAT YOU NEED TO KNOW:   A ruptured eardrum is a tear or hole in your eardrum  DISCHARGE INSTRUCTIONS:   Medicines:   · Antibiotic ear drops  may be needed to treat or prevent an infection caused by bacteria  · Take your medicine as directed  Contact your healthcare provider if you think your medicine is not helping or if you have side effects  Tell him or her if you are allergic to any medicine  Keep a list of the medicines, vitamins, and herbs you take  Include the amounts, and when and why you take them  Bring the list or the pill bottles to follow-up visits  Carry your medicine list with you in case of an emergency  Follow up with your healthcare provider as directed:  Write down your questions so you remember to ask them during your visits  Self-care:   · Always keep your ear dry  Do not let your ear get wet, such as when bathing or swimming  Water may cause your damaged eardrum to heal more slowly and increase your risk for infection  · Do not put anything in your ear  Never put objects such as cotton swabs in your ear  Pointed objects may damage or worsen the damage to your eardrum  · Try not to blow your nose  The increase in pressure may cause further damage to your eardrum  Contact your healthcare provider if:   · You have a fever  · Your hearing loss gets worse  · You feel increased dizziness, or you are vomiting  · You have worsening ear pain or a new buzzing sound in your ear  · Your symptoms do not improve, even after you take your medicine  · You have questions or concerns about your condition or care  Seek care immediately or call 911 if:   · You are bleeding from your ear  · You cannot move or feel areas of your face  © 2017 [employer] [employer] Information is for End User's use only and may not be sold, redistributed or otherwise used for commercial purposes   All illustrations and images included in CareNotes® are the copyrighted property of [employer]  or [employer]  The above information is an  only  It is not intended as medical advice for individual conditions or treatments  Talk to your doctor, nurse or pharmacist before following any medical regimen to see if it is safe and effective for you

## 2018-10-18 DIAGNOSIS — G43.909 MIGRAINE WITHOUT STATUS MIGRAINOSUS, NOT INTRACTABLE, UNSPECIFIED MIGRAINE TYPE: ICD-10-CM

## 2018-10-18 RX ORDER — AMITRIPTYLINE HYDROCHLORIDE 75 MG/1
75 TABLET, FILM COATED ORAL
Qty: 30 TABLET | Refills: 3 | Status: SHIPPED | OUTPATIENT
Start: 2018-10-18 | End: 2019-02-04 | Stop reason: SDUPTHER

## 2018-10-21 DIAGNOSIS — M79.7 FIBROMYALGIA: ICD-10-CM

## 2018-10-21 DIAGNOSIS — M54.50 CHRONIC BILATERAL LOW BACK PAIN WITHOUT SCIATICA: ICD-10-CM

## 2018-10-21 DIAGNOSIS — G89.29 CHRONIC BILATERAL LOW BACK PAIN WITHOUT SCIATICA: ICD-10-CM

## 2018-10-23 ENCOUNTER — OFFICE VISIT (OUTPATIENT)
Dept: FAMILY MEDICINE CLINIC | Facility: CLINIC | Age: 43
End: 2018-10-23
Payer: COMMERCIAL

## 2018-10-23 VITALS
DIASTOLIC BLOOD PRESSURE: 68 MMHG | SYSTOLIC BLOOD PRESSURE: 102 MMHG | TEMPERATURE: 97.1 F | WEIGHT: 190 LBS | BODY MASS INDEX: 30.67 KG/M2 | RESPIRATION RATE: 12 BRPM | HEART RATE: 72 BPM

## 2018-10-23 DIAGNOSIS — H60.322 ACUTE HEMORRHAGIC OTITIS EXTERNA OF LEFT EAR: ICD-10-CM

## 2018-10-23 DIAGNOSIS — H91.92 HEARING DECREASED, LEFT: Primary | ICD-10-CM

## 2018-10-23 PROCEDURE — 99213 OFFICE O/P EST LOW 20 MIN: CPT | Performed by: NURSE PRACTITIONER

## 2018-10-23 RX ORDER — ZOLPIDEM TARTRATE 10 MG/1
TABLET ORAL
COMMUNITY
Start: 2018-10-14 | End: 2018-10-23 | Stop reason: SDUPTHER

## 2018-10-23 NOTE — PROGRESS NOTES
Assessment:     Acute hemorrhagic otitis externa with decreased hearing     Plan:      1 cont plan of care  2  Follow with Dr Dior Santana, ENT as scheduled tomorrow  Subjective:       Santos Barnhart is a 37 y o  female who presents for re-evaluation of  L ear pain & hearing loss  Symptoms include left ear pain, plugged sensation in the left ear and sore throat  Onset of symptoms was 12 days ago, ear pain is improving, but ability to hear out of that ear has decreased and she has developed a sore throat since that time  Sore throat resolved by ear symptoms unchanged   She is drinking plenty of fluids  She denies fever/chills, sinus pressure, nasal congestion  Sick contact at work  Saw Dr Dior Santana last week  +hearing loss on hearing test  Advised steroids with follow up scheduled tomorrow  She did not stop steriods  The following portions of the patient's history were reviewed and updated as appropriate: allergies, current medications, past family history, past medical history, past social history, past surgical history and problem list     Review of Systems  Pertinent items are noted in HPI        Objective:      /68 (BP Location: Left arm, Patient Position: Sitting, Cuff Size: Adult)   Pulse 72   Temp (!) 97 1 °F (36 2 °C) (Temporal)   Resp 12   Wt 86 2 kg (190 lb)   LMP 09/06/2017   BMI 30 67 kg/m²   General appearance: alert and oriented, in no acute distress  Ears: abnormal TM left ear - dried bloody drainage collected in ear canal, unable to visualize TM  Nose: no sinus tenderness, mild nasal mucosa erythema  Throat: normal findings: oropharynx pink & moist without lesions or evidence of thrush  Lungs: clear to auscultation bilaterally  Heart: regular rate and rhythm, S1, S2 normal, no murmur, click, rub or gallop  Lymph nodes: Cervical adenopathy: L preauricular nodes How Severe Are Your Spot(S)?: mild Have Your Spot(S) Been Treated In The Past?: has not been treated Hpi Title: Evaluation of Skin Lesions Year Removed: 1900

## 2018-11-06 DIAGNOSIS — M79.7 FIBROMYALGIA: ICD-10-CM

## 2018-11-06 DIAGNOSIS — G89.29 CHRONIC BILATERAL LOW BACK PAIN WITHOUT SCIATICA: ICD-10-CM

## 2018-11-06 DIAGNOSIS — M54.50 CHRONIC BILATERAL LOW BACK PAIN WITHOUT SCIATICA: ICD-10-CM

## 2018-11-10 DIAGNOSIS — Z78.9 DEEP VEIN THROMBOSIS (DVT) PROPHYLAXIS PRESCRIBED AT DISCHARGE: ICD-10-CM

## 2018-11-10 RX ORDER — ASPIRIN 81 MG/1
TABLET, DELAYED RELEASE ORAL
Qty: 30 TABLET | Refills: 3 | Status: SHIPPED | OUTPATIENT
Start: 2018-11-10 | End: 2019-03-01 | Stop reason: SDUPTHER

## 2018-11-13 ENCOUNTER — OFFICE VISIT (OUTPATIENT)
Dept: NEUROLOGY | Facility: CLINIC | Age: 43
End: 2018-11-13
Payer: COMMERCIAL

## 2018-11-13 VITALS — DIASTOLIC BLOOD PRESSURE: 82 MMHG | SYSTOLIC BLOOD PRESSURE: 116 MMHG | HEART RATE: 89 BPM

## 2018-11-13 DIAGNOSIS — G44.309 POST-CONCUSSION HEADACHE: ICD-10-CM

## 2018-11-13 DIAGNOSIS — Z86.69 HISTORY OF MIGRAINE: Primary | ICD-10-CM

## 2018-11-13 PROCEDURE — 99214 OFFICE O/P EST MOD 30 MIN: CPT | Performed by: PSYCHIATRY & NEUROLOGY

## 2018-11-13 NOTE — PROGRESS NOTES
Return NeuroOutpatient Note        Sheri Mosher  [id number]  80 y o   1975       Migraine        History obtained from:  Patient     HPI/Subjective:    Ms  Liz Nunez is a 36 yo F with h/o migraines returns as follow up for post concussion headaches  Per my previous history, On Sept 30th 2016, She was cleaning and accidentally hit her head on brick counter  She did not loose consciousness  She had nausea and visual disturbance, dizziness afterwards  These symptoms resolved after 1 week  She had started being seen here for headaches  We are treating her for migraines  She now gets maybe 1-2 migraines/year  Relpax helps  Prior to topamax, they were several a week  She may still get one headache/week of mild intensity  She's currently on elavil 75mg qhs and topamax 50mg qhs  She now works an hour away  It is less stressful than previous one  Past Medical History:   Diagnosis Date    Asthma     Cancer ([facility] [address] )     thyroid    Cervical adenopathy     Concussion without loss of consciousness     Depression     Disease of thyroid gland     Esophageal reflux     Frequent headaches     Gastritis     Hypocalcemia     Hypoglycemia     Malignant neoplasm of thyroid gland (HCC)     Migraine     Myositis of multiple sites     Psychiatric disorder     depression    Sinus headache     Worsening headaches      Social History     Social History    Marital status: /Civil Union     Spouse name: N/A    Number of children: N/A    Years of education: N/A     Occupational History    Not on file       Social History Main Topics    Smoking status: Never Smoker    Smokeless tobacco: Never Used    Alcohol use Yes      Comment: occasional    Drug use: No    Sexual activity: Not on file     Other Topics Concern    Not on file     Social History Narrative    Caffeine use- tea         Family History   Problem Relation Age of Onset    Lupus Mother         systemic erythematosus    Crohn's disease Mother     Diabetes Father     Liver disease Father     Rheum arthritis Maternal Grandmother      Allergies   Allergen Reactions    Compazine [Prochlorperazine Edisylate] Anxiety    Demerol [Meperidine] Other (See Comments) and Hallucinations     hallucinations    Medical Tape Rash    Morphine      Morphine resistant    Prochlorperazine Anxiety    Valium [Diazepam] Syncope     Current Outpatient Prescriptions on File Prior to Visit   Medication Sig Dispense Refill    albuterol (2 5 mg/3 mL) 0 083 % nebulizer solution Take 2 5 mg by nebulization every 6 (six) hours as needed for wheezing      albuterol (VENTOLIN HFA) 90 mcg/act inhaler Inhale 2 puffs every 6 (six) hours as needed for wheezing 18 g 0    amitriptyline (ELAVIL) 75 mg tablet Take 1 tablet (75 mg total) by mouth daily at bedtime 30 tablet 3    buPROPion (WELLBUTRIN XL) 300 mg 24 hr tablet Take 300 mg by mouth every morning      busPIRone (BUSPAR) 10 mg tablet Take 10 mg by mouth 3 (three) times a day      calcitriol (ROCALTROL) 0 5 MCG capsule Take 0 5 mcg by mouth 2 (two) times a day        cetirizine (ZyrTEC) 10 mg tablet TAKE ONE TABLET BY MOUTH EVERY DAY AS NEEDED (Patient taking differently: TAKE ONE TABLET BY MOUTH EVERY DAY) 30 tablet 6    Cholecalciferol (VITAMIN D PO) Take 50,000 Units by mouth once a week      clonazePAM (KlonoPIN) 1 mg tablet Take 1 mg by mouth daily at bedtime        Cyanocobalamin (VITAMIN B 12 PO) Take 1,000 mcg by mouth daily      cyclobenzaprine (FLEXERIL) 5 mg tablet TAKE ONE TABLET BY MOUTH EVERY DAY AT BEDTIME AS NEEDED FOR MUSCLE SPASMS (GENERIC FLEXERIL) 30 tablet 3    desvenlafaxine succinate (PRISTIQ) 50 mg 24 hr tablet Take 50 mg by mouth daily      eletriptan (RELPAX) 40 MG tablet Take 40 mg by mouth once as needed        fluticasone (FLONASE) 50 mcg/act nasal spray 2 sprays into each nostril daily 16 g 0    folic acid (FOLVITE) 1 mg tablet TAKE ONE TABLET BY MOUTH EVERY DAY 30 tablet 6    gabapentin (NEURONTIN) 300 mg capsule Take 300 mg by mouth 2 (two) times a day      gabapentin (NEURONTIN) 600 MG tablet Take 300 mg by mouth daily at bedtime        levothyroxine 175 mcg tablet Take 175 mcg by mouth Five days a week, mon- fri       magnesium oxide (MAG-OX) 400 mg Take 400 mg by mouth 3 (three) times a day      montelukast (SINGULAIR) 10 mg tablet TAKE ONE TABLET BY MOUTH EVERY DAY AS DIRECTED 30 tablet 6    Multiple Vitamin (MULTIVITAMIN) tablet Take 1 tablet by mouth daily      omeprazole (PriLOSEC) 40 MG capsule TAKE ONE CAPSULE BY MOUTH EVERY DAY 30 capsule 6    ondansetron (ZOFRAN) 4 mg tablet Take 1 tablet by mouth every 8 (eight) hours as needed for nausea or vomiting for up to 6 doses 6 tablet 0    ONETOUCH VERIO test strip       SM ASPIRIN ADULT LOW STRENGTH 81 MG EC tablet TAKE ONE TABLET BY MOUTH EVERY DAY AS DIRECTED 30 tablet 3    sucralfate (CARAFATE) 1 g/10 mL suspension Take 1 g by mouth as needed        topiramate (TOPAMAX) 50 MG tablet TAKE ONE TABLET BY MOUTH EVERY DAY AT BEDTIME 30 tablet 5    traMADol-acetaminophen (ULTRACET) 37 5-325 mg per tablet TAKE ONE TABLET BY MOUTH EVERY 12 HOURS AS NEEDED FOR MODERATE PAIN (Patient taking differently: TAKE ONE TABLET BY MOUTH EVERY 12 HOURS DAILY FOR MODERATE PAIN) 30 tablet 0    Zolpidem Tartrate (AMBIEN PO) Take 10 mg by mouth daily at bedtime      Calcium Carb-Cholecalciferol (CALCIUM 600 + D PO) 3 tablets 4 times Daily       Cyanocobalamin (B-12) 2500 MCG SUBL        No current facility-administered medications on file prior to visit  Review of Systems   Refer to positive review of systems in HPI     Constitutional- No fever  Eyes- No visual change  ENT- Hearing normal  CV- No chest pain  Resp- No Shortness of breath  GI- No diarrhea  - Bladder normal  MS- No Arthritis   Skin- No rash  Psych- No depression  Endo- No DM  Heme- No nodes    Vitals:    11/13/18 1015   BP: 116/82   BP Location: Left arm   Patient Position: Sitting   Cuff Size: Standard   Pulse: 89       PHYSICAL EXAM:  Appearance: No Acute Distress  Ophthalmoscopic: Disc Flat, Normal fundus  Mental status:  Orientation: Awake, Alert, and Orientedx3  Memory: Registation 3/3 Recall 3/3  Attention: normal  Knowledge: good  Language: No aphasia  Speech: No dysarthria  Cranial Nerves:  2 No Visual Defect on Confrontation, Pupils round, equal, reactive to light  3,4,6 Extraocular Movements Intact, no nystagmus  5 Facial Sensation Intact  7 No facial asymmetry  8 Intact hearing  9,10 Palate symmetric, normal gag  11 Good shoulder shrug  12 Tongue Midline  Gait: Stable  Coordination: No ataxia with finger to nose testing, and heel to shin  Sensory: Intact, Symmetric to pinprick, light touch, vibration, and joint position  Muscle Tone: Normal              Muscle exam:  Arm Right Left Leg Right Left   Deltoid 5/5 5/5 Iliopsoas 5/5 5/5   Biceps 5/5 5/5 Quads 5/5 5/5   Triceps 5/5 5/5 Hamstrings 5/5 5/5   Wrist Extension 5/5 5/5 Ankle Dorsi Flexion 5/5 5/5   Wrist Flexion 5/5 5/5 Ankle Plantar Flexion 5/5 5/5   Interossei 5/5 5/5 Ankle Eversion 5/5 5/5   APB 5/5 5/5 Ankle Inversion 5/5 5/5       Reflexes   RJ BJ TJ KJ AJ Plantars Leach's   Right 2+ 2+ 2+ 2+ 2+ Downgoing Not present   Left 2+ 2+ 2+ 2+ 2+ Downgoing Not present     Personal review of  Labs:                    Diagnoses and all orders for this visit:          1  History of migraine     2  Post-concussion headache           Patient is stable from migraine standpoint  She's doing well on her current regimen with elavil and topamax  Resume relpax  Encouraged staying hydrated             Counseling Documentation:  The patient and/or patient's family were  counseled regarding diagnostic results  Instructions for management,risk factor reductions,prognosis of disease were discussed   Patient and family were educated regarding impressions,risks and benefits of treatment options,importance of compliance with treatment        Total time of encounter:  30 min  More than 50% of the time was used in counseling and/or coordination of care  Extent of counseling and/or coordination of care        Maurizio Milton MD  [facility]  [address]  [address]  [phone]

## 2018-11-16 DIAGNOSIS — R05.8 ALLERGIC COUGH: ICD-10-CM

## 2018-11-16 RX ORDER — MONTELUKAST SODIUM 10 MG/1
TABLET ORAL
Qty: 30 TABLET | Refills: 6 | Status: SHIPPED | OUTPATIENT
Start: 2018-11-16 | End: 2019-05-27 | Stop reason: SDUPTHER

## 2018-11-25 DIAGNOSIS — M54.50 CHRONIC BILATERAL LOW BACK PAIN WITHOUT SCIATICA: ICD-10-CM

## 2018-11-25 DIAGNOSIS — M79.7 FIBROMYALGIA: ICD-10-CM

## 2018-11-25 DIAGNOSIS — G89.29 CHRONIC BILATERAL LOW BACK PAIN WITHOUT SCIATICA: ICD-10-CM

## 2018-11-27 ENCOUNTER — OFFICE VISIT (OUTPATIENT)
Dept: FAMILY MEDICINE CLINIC | Facility: CLINIC | Age: 43
End: 2018-11-27
Payer: COMMERCIAL

## 2018-11-27 VITALS
WEIGHT: 187.6 LBS | DIASTOLIC BLOOD PRESSURE: 58 MMHG | SYSTOLIC BLOOD PRESSURE: 100 MMHG | HEART RATE: 78 BPM | TEMPERATURE: 98.4 F | RESPIRATION RATE: 16 BRPM | BODY MASS INDEX: 30.28 KG/M2

## 2018-11-27 DIAGNOSIS — E03.9 ADULT HYPOTHYROIDISM: ICD-10-CM

## 2018-11-27 DIAGNOSIS — J02.9 SORE THROAT: Primary | ICD-10-CM

## 2018-11-27 PROCEDURE — 99213 OFFICE O/P EST LOW 20 MIN: CPT | Performed by: NURSE PRACTITIONER

## 2018-11-27 PROCEDURE — 1036F TOBACCO NON-USER: CPT | Performed by: NURSE PRACTITIONER

## 2018-11-27 NOTE — PROGRESS NOTES
Assessment:      Sore throat  Viral vs PND  Plan:      Use of OTC analgesics recommended as well as salt water gargles  Resume flonase  Supportive care=fluids, rest  Antibiotics not indicated at this time  Call if sxs progress as discussed       Subjective:       History was provided by the patient  Adela Beaulieu is a 37 y o  female who presents for evaluation of a sore throat  Associated symptoms include post nasal drip and sore throat  Denies congestion, fever, chills, cough, body aches  Onset of symptoms was 1 day ago, stable since that time  She is drinking moderate amounts of fluids  She has not had recent close exposure to someone with proven streptococcal pharyngitis  +h/o seasonal allergies  Not taking allergy meds  The following portions of the patient's history were reviewed and updated as appropriate: allergies, current medications, past family history, past medical history, past social history, past surgical history and problem list     Review of Systems  Pertinent items are noted in HPI        Objective:      /58 (BP Location: Left arm, Patient Position: Sitting, Cuff Size: Standard)   Pulse 78   Temp 98 4 °F (36 9 °C)   Resp 16   Wt 85 1 kg (187 lb 9 6 oz)   LMP 09/06/2017   BMI 30 28 kg/m²   General appearance: alert and oriented, in no acute distress  Head: Normocephalic, without obvious abnormality, sinuses nontender to percussion  Ears: normal TM's and external ear canals both ears  Nose: no discharge, turbinates pale  Throat: abnormal findings: mild oropharyngeal erythema  Lungs: clear to auscultation bilaterally  Heart: regular rate and rhythm, S1, S2 normal, no murmur, click, rub or gallop  Pulses: 2+ and symmetric  Lymph nodes: Cervical adenopathy: normal  Neurologic: Grossly normal

## 2018-11-28 LAB — HBA1C MFR BLD HPLC: 4.4 %

## 2018-11-28 NOTE — PATIENT INSTRUCTIONS
Postnasal Drip   AMBULATORY CARE:   Postnasal drip  is a condition that causes a large amount of mucus to collect in your throat or nose  It may also be called upper airway cough syndrome because the mucus causes repeated coughing  You may have a sore throat, or throat tissues may swell  This may feel like a lump in your throat  You may also feel like you need to clear your throat often  Contact your healthcare provider if:   · You have trouble breathing because of the mucus  · You have new or worsening symptoms, even with treatment  · You have signs of an infection, such as yellow or green mucus, or a fever  · You have questions or concerns about your condition or care  Treatment  may include any of the following:  · Medicines  may be given to thin the mucus  You may need to swallow the medicine or use a device to flush your sinuses with liquid squirted into your nose  Nasal sprays may also be needed to keep the tissues in your nose moist  Medicines can also relieve congestion  Allergy medicine may help if your symptoms are caused by seasonal allergies, such as hay fever  You may need medicine to help control GERD  · Antibiotics  may be needed to treat a bacterial infection  Manage postnasal drip:   · Use a humidifier or vaporizer  Use a cool mist humidifier or a vaporizer to increase air moisture in your home  This may make it easier for you to breathe  · Drink more liquids as directed  Liquids help keep your air passages moist and help you cough up mucus  Ask how much liquid to drink each day and which liquids are best for you  · Avoid cold air and dry, heated air  Cold or dry air can trigger postnasal drip  Try to stay inside on cold days, or keep your mouth covered  Do not stay long in areas that have dry, heated air  · Do not smoke, and avoid secondhand smoke  Nicotine and other chemicals in cigarettes and cigars can irritate your throat and make coughing worse   Ask your healthcare provider for information if you currently smoke and need help to quit  E-cigarettes or smokeless tobacco still contain nicotine  Talk to your healthcare provider before you use these products  Follow up with your healthcare provider as directed:  Write down your questions so you remember to ask them during your visits  © 2017 [address] Information is for End User's use only and may not be sold, redistributed or otherwise used for commercial purposes  All illustrations and images included in CareNotes® are the copyrighted property of [employer]  or [employer]man [place]  The above information is an  only  It is not intended as medical advice for individual conditions or treatments  Talk to your doctor, nurse or pharmacist before following any medical regimen to see if it is safe and effective for you

## 2018-12-10 DIAGNOSIS — G89.29 CHRONIC BILATERAL LOW BACK PAIN WITHOUT SCIATICA: ICD-10-CM

## 2018-12-10 DIAGNOSIS — M79.7 FIBROMYALGIA: ICD-10-CM

## 2018-12-10 DIAGNOSIS — M54.50 CHRONIC BILATERAL LOW BACK PAIN WITHOUT SCIATICA: ICD-10-CM

## 2018-12-11 DIAGNOSIS — M54.50 CHRONIC BILATERAL LOW BACK PAIN WITHOUT SCIATICA: ICD-10-CM

## 2018-12-11 DIAGNOSIS — G89.29 CHRONIC BILATERAL LOW BACK PAIN WITHOUT SCIATICA: ICD-10-CM

## 2018-12-11 DIAGNOSIS — M79.7 FIBROMYALGIA: ICD-10-CM

## 2018-12-11 NOTE — TELEPHONE ENCOUNTER
From: Home Kim  Sent: 12/10/2018 6:26 PM EST  Subject: Medication Renewal Request    Home Kim would like a refill of the following medications:     traMADol-acetaminophen (ULTRACET) 37 5-325 mg per tablet DARIAN nAsari]    Preferred pharmacy: [address] [place] [address] [phone]    Comment:

## 2018-12-23 DIAGNOSIS — G89.29 CHRONIC BILATERAL LOW BACK PAIN WITHOUT SCIATICA: ICD-10-CM

## 2018-12-23 DIAGNOSIS — M79.7 FIBROMYALGIA: ICD-10-CM

## 2018-12-23 DIAGNOSIS — M54.50 CHRONIC BILATERAL LOW BACK PAIN WITHOUT SCIATICA: ICD-10-CM

## 2019-01-04 DIAGNOSIS — K21.9 GASTROESOPHAGEAL REFLUX DISEASE WITHOUT ESOPHAGITIS: ICD-10-CM

## 2019-01-04 RX ORDER — OMEPRAZOLE 40 MG/1
CAPSULE, DELAYED RELEASE ORAL
Qty: 30 CAPSULE | Refills: 6 | Status: SHIPPED | OUTPATIENT
Start: 2019-01-04 | End: 2019-07-12

## 2019-01-07 DIAGNOSIS — G89.29 CHRONIC BILATERAL LOW BACK PAIN WITHOUT SCIATICA: ICD-10-CM

## 2019-01-07 DIAGNOSIS — G43.109 MIGRAINE WITH AURA AND WITHOUT STATUS MIGRAINOSUS, NOT INTRACTABLE: ICD-10-CM

## 2019-01-07 DIAGNOSIS — M79.7 FIBROMYALGIA: ICD-10-CM

## 2019-01-07 DIAGNOSIS — M54.50 CHRONIC BILATERAL LOW BACK PAIN WITHOUT SCIATICA: ICD-10-CM

## 2019-01-07 RX ORDER — CYCLOBENZAPRINE HCL 5 MG
TABLET ORAL
Qty: 30 TABLET | Refills: 3 | Status: SHIPPED | OUTPATIENT
Start: 2019-01-07 | End: 2019-04-15

## 2019-01-07 RX ORDER — TOPIRAMATE 50 MG/1
TABLET, FILM COATED ORAL
Qty: 30 TABLET | Refills: 5 | Status: SHIPPED | OUTPATIENT
Start: 2019-01-07 | End: 2019-06-17 | Stop reason: SDUPTHER

## 2019-01-08 DIAGNOSIS — G89.29 CHRONIC BILATERAL LOW BACK PAIN WITHOUT SCIATICA: ICD-10-CM

## 2019-01-08 DIAGNOSIS — M54.50 CHRONIC BILATERAL LOW BACK PAIN WITHOUT SCIATICA: ICD-10-CM

## 2019-01-08 DIAGNOSIS — M79.7 FIBROMYALGIA: ICD-10-CM

## 2019-01-09 ENCOUNTER — TELEPHONE (OUTPATIENT)
Dept: GASTROENTEROLOGY | Facility: CLINIC | Age: 44
End: 2019-01-09

## 2019-01-09 DIAGNOSIS — R10.13 EPIGASTRIC PAIN: Primary | ICD-10-CM

## 2019-01-09 RX ORDER — SUCRALFATE ORAL 1 G/10ML
1 SUSPENSION ORAL
Qty: 1200 ML | Refills: 1 | Status: SHIPPED | OUTPATIENT
Start: 2019-01-09 | End: 2019-07-15 | Stop reason: SDUPTHER

## 2019-01-09 NOTE — TELEPHONE ENCOUNTER
Received fax from [email or web address] [facility] for a refill of the sucralafate 1 gm suspension    Take 10 ml po TID    Please advise    Last office visit 11/08/2017

## 2019-01-09 NOTE — TELEPHONE ENCOUNTER
Sent refill for total of 2 months  Needs follow up prior to further refills, last appt 11/2017   Please call to schedule

## 2019-01-24 DIAGNOSIS — M54.50 CHRONIC BILATERAL LOW BACK PAIN WITHOUT SCIATICA: ICD-10-CM

## 2019-01-24 DIAGNOSIS — G89.29 CHRONIC BILATERAL LOW BACK PAIN WITHOUT SCIATICA: ICD-10-CM

## 2019-01-24 DIAGNOSIS — M79.7 FIBROMYALGIA: ICD-10-CM

## 2019-02-04 DIAGNOSIS — G43.909 MIGRAINE WITHOUT STATUS MIGRAINOSUS, NOT INTRACTABLE, UNSPECIFIED MIGRAINE TYPE: ICD-10-CM

## 2019-02-04 RX ORDER — AMITRIPTYLINE HYDROCHLORIDE 75 MG/1
75 TABLET, FILM COATED ORAL
Qty: 30 TABLET | Refills: 3 | Status: SHIPPED | OUTPATIENT
Start: 2019-02-04 | End: 2019-04-10 | Stop reason: HOSPADM

## 2019-02-08 DIAGNOSIS — M79.7 FIBROMYALGIA: ICD-10-CM

## 2019-02-08 DIAGNOSIS — G89.29 CHRONIC BILATERAL LOW BACK PAIN WITHOUT SCIATICA: ICD-10-CM

## 2019-02-08 DIAGNOSIS — M54.50 CHRONIC BILATERAL LOW BACK PAIN WITHOUT SCIATICA: ICD-10-CM

## 2019-02-10 ENCOUNTER — TELEPHONE (OUTPATIENT)
Dept: OTHER | Facility: OTHER | Age: 44
End: 2019-02-10

## 2019-02-10 NOTE — TELEPHONE ENCOUNTER
Client called to state that upon arriving at [address], that  was told that they did not know anything about client's arrival and for him to go wait in car  Client informed that I would call into [facility] and speak with [facility] charge nurse  Spoke with Yefri Méndez RN who states that room would be ready in a couple of minutes as they were working on moving Pediatric, Immunocompressed and Pregnant patients away from a room that could be used for isolation for her after report of patient coming by Dr Rajani Méndez RN also said that she wasn't even registered yet, that  did not register her  Client called back and informed of above  Client states that is not what was said to her   I apolgoized for any miscommunication from any party  Client states she will go to [facility] where her Endocrinalogist covers and that is where she should of went in first place  Client hung up on this nurse   Angel Best recalled and notified that client going to another hospital

## 2019-02-10 NOTE — TELEPHONE ENCOUNTER
Harleen Flowers 1975  CONFIDENTIALTY NOTICE: This fax transmission is intended only for the addressee  It contains information that is legally privileged,  confidential or otherwise protected from use or disclosure  If you are not the intended recipient, you are strictly prohibited from reviewing,  disclosing, copying using or disseminating any of this information or taking any action in reliance on or regarding this information  If you have  received this fax in error, please notify us immediately by telephone so that we can arrange for its return to us  Page:   Call Id: [id number]  Health Call  Standard Call Report  Health Call  Patient Name: Harleen Flowers  Gender: Female  : 1975  Age: 37 Y 7 M 29 D  Return Phone  Number: [phone] (Home)  Address: [place] [address]   City/State/Zip: [address] [place] [address]  Practice Name:  [facility]  Practice Charged:  Physician:  [facility] Name:  Relationship To  Patient: Self  Return Phone Number: [phone] (Home)  Presenting Problem: "I think I am coming down with the  flu "  Service Type: Triage  Charged Service 1: Paola Ji U  38  Name and  Number:  Nurse Assessment  Nurse: Carlos Leblanc RN, Sameera Mitchell Date/Time: 2/10/2019 12:23:30 PM  Type of assessment required:  ---General (Adult or Child)  Duration of Current S/S  ---3 days  Location/Radiation  ---GI  Temperature (F) and route:  ---101 oral at 1100  Symptom Specific Meds (Dose/Time):  ---Ultracet (has 325mg Tylenol) LD 0900 Zofran 4mg, LD 19 at 1800  Other S/S  ---Nausea start 19  Vomting 19  No vomiting today  Emesis 3-4 times , no  blood or bile  Diarrhea start 19  3 episodes since start night  Liquid stool  Did not  check for color  No abdomen pain  No Zofran taken today  No signs of dehydration  No nasal congestion  Cough productive for yellow sputum  Fever with chills  No chest  discomfort    Pain Scale on scale of 1-10, 10 being the worst:  ---N/A  Symptom progression:  ---worse  Santos Barnhart 1975  CONFIDENTIALTY NOTICE: This fax transmission is intended only for the addressee  It contains information that is legally privileged,  confidential or otherwise protected from use or disclosure  If you are not the intended recipient, you are strictly prohibited from reviewing,  disclosing, copying using or disseminating any of this information or taking any action in reliance on or regarding this information  If you have  received this fax in error, please notify us immediately by telephone so that we can arrange for its return to us  Page: 2 of 2  Call Id: [id number]  Nurse Assessment  Intake and Output  ---Poor solid and liquid intake  Last void this am   LMP/ Pregnancy:  ---Hysterectomy  Breastfeeding  ---No  Last Exam/Treatment:  ---PCP few weeks ago Radiactive Iodine Tx 2/6/19, on isolation 6 foot radius then 2  foot radius through 2/13/19  Protocols  Protocol Title Nurse Date/Time  Influenza - Seasonal [facility]ray, [facility] 2/10/2019 12:39:21 PM  Question Caller Affirmed  Disp  Time Disposition Final User  2/10/2019 12:57:11 PM See Physician within 4 Hours (or PCP  triage)  Yes KIYA Wray, [facility] Advice Given Per Protocol  SEE PHYSICIAN WITHIN 4 HOURS (or PCP triage): * IF OFFICE WILL BE CLOSED AND NO PCP TRIAGE: You need to be seen  within the next 3 or 4 hours  A nearby Urgent Care Center is often a good source of care  Another choice is to go to the ER  Go sooner if  you become worse  FEVER MEDICINE - ACETAMINOPHEN: * Fever above 101° F (38 3° C) should be treated with acetaminophen  (e g , Tylenol)  This can be taken by mouth as pills or per rectum using a suppository  Both are available over the counter  Usual adult  dose is 650 mg by mouth or per rectum every 6 hours  * The goal of fever therapy is to bring the fever down to a comfortable level  Remember that fever medicine usually lowers fever 2-3° F (1-1 5° C)   NO ASPIRIN: Do not use aspirin for treatment of fever or pain  (Reason: there is an association between influenza and Thyra Haste' Syndrome)  CALL BACK IF: * You become worse  CARE ADVICE given  per INFLUENZA - SEASONAL (Adult) guideline  Caller Understands: Yes  Caller Disagree/Comply: Comply  PreDisposition: Unsure  Comments  User: Emely Orozco RN Date/Time: 2/10/2019 1:02:36 PM  [facility] called re isolation related to Radiactive Iodine Tx, spoke with Dr Slick Gergg  Informed Dr Slick Gregg  of patient's symptoms including concern for dehydration as she has poor intake and is supposed to be drinking greater than one  gallon of fluids daily with tx  States that patient's  to come into [facility] to make them aware that patient arrived and when  room ready, they will bring her in from car  Patient called and informed of same

## 2019-02-11 ENCOUNTER — TELEPHONE (OUTPATIENT)
Dept: FAMILY MEDICINE CLINIC | Facility: CLINIC | Age: 44
End: 2019-02-11

## 2019-02-22 DIAGNOSIS — M79.7 FIBROMYALGIA: ICD-10-CM

## 2019-02-22 DIAGNOSIS — M54.50 CHRONIC BILATERAL LOW BACK PAIN WITHOUT SCIATICA: ICD-10-CM

## 2019-02-22 DIAGNOSIS — G89.29 CHRONIC BILATERAL LOW BACK PAIN WITHOUT SCIATICA: ICD-10-CM

## 2019-03-01 DIAGNOSIS — Z78.9 DEEP VEIN THROMBOSIS (DVT) PROPHYLAXIS PRESCRIBED AT DISCHARGE: ICD-10-CM

## 2019-03-01 RX ORDER — ASPIRIN 81 MG/1
TABLET, DELAYED RELEASE ORAL
Qty: 30 TABLET | Refills: 3 | Status: SHIPPED | OUTPATIENT
Start: 2019-03-01 | End: 2019-06-24 | Stop reason: SDUPTHER

## 2019-03-07 DIAGNOSIS — J30.1 SEASONAL ALLERGIC RHINITIS DUE TO POLLEN: ICD-10-CM

## 2019-03-07 DIAGNOSIS — J45.20 ASTHMA, ALLERGIC, MILD INTERMITTENT, UNCOMPLICATED: ICD-10-CM

## 2019-03-07 RX ORDER — ALBUTEROL SULFATE 90 UG/1
2 AEROSOL, METERED RESPIRATORY (INHALATION) EVERY 6 HOURS PRN
Qty: 18 G | Refills: 0 | Status: SHIPPED | OUTPATIENT
Start: 2019-03-07 | End: 2019-06-09 | Stop reason: SDUPTHER

## 2019-03-07 RX ORDER — FLUTICASONE PROPIONATE 50 MCG
2 SPRAY, SUSPENSION (ML) NASAL DAILY
Qty: 16 G | Refills: 0 | Status: SHIPPED | OUTPATIENT
Start: 2019-03-07 | End: 2019-06-09 | Stop reason: SDUPTHER

## 2019-03-08 ENCOUNTER — OFFICE VISIT (OUTPATIENT)
Dept: FAMILY MEDICINE CLINIC | Facility: CLINIC | Age: 44
End: 2019-03-08
Payer: COMMERCIAL

## 2019-03-08 VITALS
BODY MASS INDEX: 30.16 KG/M2 | WEIGHT: 181 LBS | SYSTOLIC BLOOD PRESSURE: 100 MMHG | HEIGHT: 65 IN | TEMPERATURE: 98.4 F | HEART RATE: 78 BPM | RESPIRATION RATE: 14 BRPM | DIASTOLIC BLOOD PRESSURE: 60 MMHG

## 2019-03-08 DIAGNOSIS — E03.9 ADULT HYPOTHYROIDISM: ICD-10-CM

## 2019-03-08 DIAGNOSIS — R53.82 CHRONIC FATIGUE: Primary | ICD-10-CM

## 2019-03-08 PROCEDURE — 1036F TOBACCO NON-USER: CPT | Performed by: NURSE PRACTITIONER

## 2019-03-08 PROCEDURE — 3008F BODY MASS INDEX DOCD: CPT | Performed by: NURSE PRACTITIONER

## 2019-03-08 PROCEDURE — 99213 OFFICE O/P EST LOW 20 MIN: CPT | Performed by: NURSE PRACTITIONER

## 2019-03-08 NOTE — PROGRESS NOTES
Assessment/Plan     Fatigue of unclear cause  Likely thyroid  Recent  after iodine therapy  Query depression as a component  Exam unrevealing    Discussed diagnosis with patient  Discussed how depression can be a cause of fatigue  Follow up in 1 month or as needed  Cont thyroid management per Dr Ana Catalan  Speak with psychiatrist about depression as a factor  Subjective     Harleen Flowers is a 37 y o  female who presents for evaluation of fatigue  Symptoms began several weeks ago  The patient feels the fatigue began with: after iodine treatment for +thyroid antibodies per Dr Ana catalan  Symptoms of her fatigue have been feelings of depression, general malaise, lack of interest in usual activities and cold intolerance  Patient describes the following psychological symptoms: depression  Patient denies change in hair texture, fever, GI blood loss, significant change in weight, symptoms of arthritis, unusual rashes and witnessed or suspected sleep apnea  Symptoms have progressed to a point and plateaued  Symptom severity: struggles to carry out day to day responsibilities    Previous visits for this problem: multiple, this is a longstanding diagnosis  Last seen several months ago by me  The following portions of the patient's history were reviewed and updated as appropriate: allergies, current medications, past family history, past medical history, past social history, past surgical history and problem list     Review of Systems  Pertinent items are noted in HPI       Objective     /60 (BP Location: Left arm, Patient Position: Sitting, Cuff Size: Adult)   Pulse 78   Temp 98 4 °F (36 9 °C) (Tympanic)   Resp 14   Ht 5' 5" (1 651 m)   Wt 82 1 kg (181 lb)   LMP 09/06/2017   BMI 30 12 kg/m²   General appearance: alert and oriented, in no acute distress  Neck: no adenopathy, no carotid bruit, no JVD, supple, symmetrical, trachea midline and thyroid: absent  Lungs: clear to auscultation bilaterally  Heart: regular rate and rhythm, S1, S2 normal, no murmur, click, rub or gallop  Abdomen: soft, non-tender; bowel sounds normal; no masses,  no organomegaly  Extremities: extremities normal, warm and well-perfused; no cyanosis, clubbing, or edema  Pulses: 2+ and symmetric  Lymph nodes: Cervical, supraclavicular, and axillary nodes normal   Neurologic: Grossly normal

## 2019-03-08 NOTE — PATIENT INSTRUCTIONS
Fatigue, Ambulatory Care   GENERAL INFORMATION:   Fatigue  is mental and physical exhaustion that does not get better with rest  It may interfere with your daily activities and can cause extreme sleepiness  Although it is normal to feel tired sometimes, long-term fatigue may be a sign of serious illness  Seek immediate care for the following symptoms:   · Difficulty breathing    · Chest pain  Management and treatment for fatigue includes the following:   · Keep a fatigue diary  to help you find out what makes you feel more or less tired  · Exercise as directed  Ask your healthcare provider about the best exercise plan for you  Even moderate exercise may decrease your fatigue  · Keep a regular schedule  Go to bed at the same time every night and limit naps  · Eat healthy foods  including fruits, vegetables, whole-grain breads, low-fat dairy products, beans, lean meats, and fish  Ask if you need to be on a special diet  · Limit caffeine and alcohol  because they can interfere with your sleep  Women should limit alcohol to 1 drink a day  Men should limit alcohol to 2 drinks a day  A drink of alcohol is 12 ounces of beer, 5 ounces of wine, or 1½ ounces of liquor  Ask your healthcare provider how much caffeine is safe for you  · Do not smoke  Smoking can cause health problems that make you tired  If you smoke, it is never too late to quit  Ask your healthcare provider for information if you need help quitting  Follow up with your healthcare provider as directed:  Write down your questions so you remember to ask them during your visits  CARE AGREEMENT:   You have the right to help plan your care  Learn about your health condition and how it may be treated  Discuss treatment options with your caregivers to decide what care you want to receive  You always have the right to refuse treatment  The above information is an  only   It is not intended as medical advice for individual conditions or treatments  Talk to your doctor, nurse or pharmacist before following any medical regimen to see if it is safe and effective for you  © 2014 [address] is for End User's use only and may not be sold, redistributed or otherwise used for commercial purposes  All illustrations and images included in CareNotes® are the copyrighted property of A D A M , [employer]  or Harman Houston

## 2019-03-17 DIAGNOSIS — G89.29 CHRONIC BILATERAL LOW BACK PAIN WITHOUT SCIATICA: ICD-10-CM

## 2019-03-17 DIAGNOSIS — M79.7 FIBROMYALGIA: ICD-10-CM

## 2019-03-17 DIAGNOSIS — M54.50 CHRONIC BILATERAL LOW BACK PAIN WITHOUT SCIATICA: ICD-10-CM

## 2019-03-17 RX ORDER — GABAPENTIN 300 MG/1
CAPSULE ORAL
Qty: 120 CAPSULE | Refills: 6 | Status: SHIPPED | OUTPATIENT
Start: 2019-03-17 | End: 2019-07-18 | Stop reason: ALTCHOICE

## 2019-03-18 DIAGNOSIS — M79.7 FIBROMYALGIA: ICD-10-CM

## 2019-03-18 DIAGNOSIS — M54.50 CHRONIC BILATERAL LOW BACK PAIN WITHOUT SCIATICA: ICD-10-CM

## 2019-03-18 DIAGNOSIS — G89.29 CHRONIC BILATERAL LOW BACK PAIN WITHOUT SCIATICA: ICD-10-CM

## 2019-04-04 DIAGNOSIS — Z98.84 S/P GASTRIC BYPASS: ICD-10-CM

## 2019-04-04 DIAGNOSIS — J30.2 CHRONIC SEASONAL ALLERGIC RHINITIS: ICD-10-CM

## 2019-04-04 RX ORDER — FOLIC ACID 1 MG/1
TABLET ORAL
Qty: 30 TABLET | Refills: 6 | Status: SHIPPED | OUTPATIENT
Start: 2019-04-04 | End: 2019-04-08

## 2019-04-04 RX ORDER — CETIRIZINE HYDROCHLORIDE 10 MG/1
TABLET ORAL
Qty: 30 TABLET | Refills: 6 | Status: ON HOLD | OUTPATIENT
Start: 2019-04-04 | End: 2019-06-14

## 2019-04-08 ENCOUNTER — APPOINTMENT (INPATIENT)
Dept: NEUROLOGY | Facility: HOSPITAL | Age: 44
DRG: 034 | End: 2019-04-08
Payer: COMMERCIAL

## 2019-04-08 ENCOUNTER — APPOINTMENT (EMERGENCY)
Dept: RADIOLOGY | Facility: HOSPITAL | Age: 44
DRG: 034 | End: 2019-04-08
Attending: EMERGENCY MEDICINE
Payer: COMMERCIAL

## 2019-04-08 ENCOUNTER — HOSPITAL ENCOUNTER (INPATIENT)
Facility: HOSPITAL | Age: 44
LOS: 2 days | Discharge: HOME | DRG: 034 | End: 2019-04-10
Attending: EMERGENCY MEDICINE | Admitting: ANESTHESIOLOGY
Payer: COMMERCIAL

## 2019-04-08 ENCOUNTER — APPOINTMENT (INPATIENT)
Dept: RADIOLOGY | Facility: HOSPITAL | Age: 44
DRG: 034 | End: 2019-04-08
Payer: COMMERCIAL

## 2019-04-08 DIAGNOSIS — M72.2 PLANTAR FASCIITIS OF RIGHT FOOT: ICD-10-CM

## 2019-04-08 DIAGNOSIS — Z79.899 POLYPHARMACY: ICD-10-CM

## 2019-04-08 DIAGNOSIS — R41.82 ALTERED MENTAL STATUS: Primary | ICD-10-CM

## 2019-04-08 DIAGNOSIS — F41.8 DEPRESSION WITH ANXIETY: Chronic | ICD-10-CM

## 2019-04-08 DIAGNOSIS — J69.0 ASPIRATION PNEUMONIA (HCC): ICD-10-CM

## 2019-04-08 DIAGNOSIS — M79.671 RIGHT FOOT PAIN: ICD-10-CM

## 2019-04-08 DIAGNOSIS — M62.82 RHABDOMYOLYSIS: ICD-10-CM

## 2019-04-08 PROBLEM — R06.89 ACUTE RESPIRATORY INSUFFICIENCY: Status: ACTIVE | Noted: 2019-04-08

## 2019-04-08 PROBLEM — J45.909 REACTIVE AIRWAY DISEASE: Chronic | Status: ACTIVE | Noted: 2017-09-10

## 2019-04-08 PROBLEM — E03.9 ADULT HYPOTHYROIDISM: Chronic | Status: ACTIVE | Noted: 2017-08-25

## 2019-04-08 LAB
ALBUMIN SERPL BCP-MCNC: 3.4 G/DL (ref 3.5–5)
ALP SERPL-CCNC: 81 U/L (ref 46–116)
ALT SERPL W P-5'-P-CCNC: 21 U/L (ref 12–78)
AMPHETAMINES SERPL QL SCN: NEGATIVE
ANION GAP SERPL CALCULATED.3IONS-SCNC: 13 MMOL/L (ref 4–13)
APAP SERPL-MCNC: <2 UG/ML (ref 10–20)
ARTERIAL PATENCY WRIST A: YES
AST SERPL W P-5'-P-CCNC: 23 U/L (ref 5–45)
ATRIAL RATE: 101 BPM
ATRIAL RATE: 92 BPM
ATRIAL RATE: 94 BPM
BARBITURATES UR QL: NEGATIVE
BASE EXCESS BLDA CALC-SCNC: -5.2 MMOL/L
BASOPHILS # BLD AUTO: 0.01 THOUSANDS/ΜL (ref 0–0.1)
BASOPHILS NFR BLD AUTO: 0 % (ref 0–1)
BENZODIAZ UR QL: POSITIVE
BILIRUB SERPL-MCNC: 0.2 MG/DL (ref 0.2–1)
BILIRUB UR QL STRIP: NEGATIVE
BODY TEMPERATURE: 99.2 DEGREES FEHRENHEIT
BUN SERPL-MCNC: 25 MG/DL (ref 5–25)
CALCIUM SERPL-MCNC: 8.1 MG/DL (ref 8.3–10.1)
CHLORIDE SERPL-SCNC: 108 MMOL/L (ref 100–108)
CK MB SERPL-MCNC: 3.4 NG/ML (ref 0–5)
CK MB SERPL-MCNC: <1 % (ref 0–2.5)
CK SERPL-CCNC: 1918 U/L (ref 26–192)
CLARITY UR: CLEAR
CO2 SERPL-SCNC: 21 MMOL/L (ref 21–32)
COCAINE UR QL: NEGATIVE
COLOR UR: YELLOW
CREAT SERPL-MCNC: 0.81 MG/DL (ref 0.6–1.3)
EOSINOPHIL # BLD AUTO: 0 THOUSAND/ΜL (ref 0–0.61)
EOSINOPHIL NFR BLD AUTO: 0 % (ref 0–6)
ERYTHROCYTE [DISTWIDTH] IN BLOOD BY AUTOMATED COUNT: 12.4 % (ref 11.6–15.1)
ERYTHROCYTE [SEDIMENTATION RATE] IN BLOOD: 12 MM/HOUR (ref 2–25)
ETHANOL SERPL-MCNC: <3 MG/DL (ref 0–3)
GFR SERPL CREATININE-BSD FRML MDRD: 89 ML/MIN/1.73SQ M
GLUCOSE SERPL-MCNC: 142 MG/DL (ref 65–140)
GLUCOSE UR STRIP-MCNC: NEGATIVE MG/DL
HCO3 BLDA-SCNC: 19 MMOL/L (ref 22–28)
HCT VFR BLD AUTO: 37.9 % (ref 34.8–46.1)
HGB BLD-MCNC: 12.3 G/DL (ref 11.5–15.4)
HGB UR QL STRIP.AUTO: NEGATIVE
HOROWITZ INDEX BLDA+IHG-RTO: 100 MM[HG]
IMM GRANULOCYTES # BLD AUTO: 0.07 THOUSAND/UL (ref 0–0.2)
IMM GRANULOCYTES NFR BLD AUTO: 1 % (ref 0–2)
KETONES UR STRIP-MCNC: ABNORMAL MG/DL
LACTATE SERPL-SCNC: 1 MMOL/L (ref 0.5–2)
LEUKOCYTE ESTERASE UR QL STRIP: NEGATIVE
LYMPHOCYTES # BLD AUTO: 0.53 THOUSANDS/ΜL (ref 0.6–4.47)
LYMPHOCYTES NFR BLD AUTO: 4 % (ref 14–44)
MCH RBC QN AUTO: 31.1 PG (ref 26.8–34.3)
MCHC RBC AUTO-ENTMCNC: 32.5 G/DL (ref 31.4–37.4)
MCV RBC AUTO: 96 FL (ref 82–98)
METHADONE UR QL: NEGATIVE
MONOCYTES # BLD AUTO: 0.22 THOUSAND/ΜL (ref 0.17–1.22)
MONOCYTES NFR BLD AUTO: 2 % (ref 4–12)
NEUTROPHILS # BLD AUTO: 11.13 THOUSANDS/ΜL (ref 1.85–7.62)
NEUTS SEG NFR BLD AUTO: 93 % (ref 43–75)
NITRITE UR QL STRIP: NEGATIVE
NRBC BLD AUTO-RTO: 0 /100 WBCS
O2 CT BLDA-SCNC: 17.4 ML/DL (ref 16–23)
OPIATES UR QL SCN: NEGATIVE
OXYHGB MFR BLDA: 96.4 % (ref 94–97)
P AXIS: 55 DEGREES
P AXIS: 57 DEGREES
P AXIS: 73 DEGREES
PCO2 BLDA: 33 MM HG (ref 36–44)
PCO2 TEMP ADJ BLDA: 33.4 MM HG (ref 36–44)
PCP UR QL: NEGATIVE
PEEP RESPIRATORY: 5 CM[H2O]
PH BLD: 7.38 [PH] (ref 7.35–7.45)
PH BLDA: 7.38 [PH] (ref 7.35–7.45)
PH UR STRIP.AUTO: 5.5 [PH]
PLATELET # BLD AUTO: 270 THOUSANDS/UL (ref 149–390)
PMV BLD AUTO: 10.5 FL (ref 8.9–12.7)
PO2 BLD: 210.4 MM HG (ref 75–129)
PO2 BLDA: 208.9 MM HG (ref 75–129)
POTASSIUM SERPL-SCNC: 4.2 MMOL/L (ref 3.5–5.3)
PR INTERVAL: 166 MS
PR INTERVAL: 170 MS
PR INTERVAL: 174 MS
PROT SERPL-MCNC: 6.7 G/DL (ref 6.4–8.2)
PROT UR STRIP-MCNC: NEGATIVE MG/DL
QRS AXIS: 63 DEGREES
QRS AXIS: 72 DEGREES
QRS AXIS: 72 DEGREES
QRSD INTERVAL: 94 MS
QRSD INTERVAL: 96 MS
QRSD INTERVAL: 96 MS
QT INTERVAL: 362 MS
QT INTERVAL: 374 MS
QT INTERVAL: 392 MS
QTC INTERVAL: 447 MS
QTC INTERVAL: 484 MS
QTC INTERVAL: 490 MS
RBC # BLD AUTO: 3.95 MILLION/UL (ref 3.81–5.12)
SALICYLATES SERPL-MCNC: <3 MG/DL (ref 3–20)
SODIUM SERPL-SCNC: 142 MMOL/L (ref 136–145)
SP GR UR STRIP.AUTO: >=1.03 (ref 1–1.03)
SPECIMEN SOURCE: ABNORMAL
T WAVE AXIS: 11 DEGREES
T WAVE AXIS: 17 DEGREES
T WAVE AXIS: 49 DEGREES
T4 FREE SERPL-MCNC: 1.18 NG/DL (ref 0.76–1.46)
THC UR QL: NEGATIVE
TROPONIN I SERPL-MCNC: <0.02 NG/ML
TSH SERPL DL<=0.05 MIU/L-ACNC: 0.06 UIU/ML (ref 0.36–3.74)
UROBILINOGEN UR QL STRIP.AUTO: 0.2 E.U./DL
VENT AC: 16
VENT- AC: AC
VENTRICULAR RATE: 101 BPM
VENTRICULAR RATE: 92 BPM
VENTRICULAR RATE: 94 BPM
VT SETTING VENT: 400 ML
WBC # BLD AUTO: 11.96 THOUSAND/UL (ref 4.31–10.16)

## 2019-04-08 PROCEDURE — 72125 CT NECK SPINE W/O DYE: CPT

## 2019-04-08 PROCEDURE — 70450 CT HEAD/BRAIN W/O DYE: CPT

## 2019-04-08 PROCEDURE — 82550 ASSAY OF CK (CPK): CPT | Performed by: PSYCHIATRY & NEUROLOGY

## 2019-04-08 PROCEDURE — 82805 BLOOD GASES W/O2 SATURATION: CPT | Performed by: EMERGENCY MEDICINE

## 2019-04-08 PROCEDURE — A9585 GADOBUTROL INJECTION: HCPCS | Performed by: PSYCHIATRY & NEUROLOGY

## 2019-04-08 PROCEDURE — 99449 NTRPROF PH1/NTRNET/EHR 31/>: CPT | Performed by: EMERGENCY MEDICINE

## 2019-04-08 PROCEDURE — 5A1935Z RESPIRATORY VENTILATION, LESS THAN 24 CONSECUTIVE HOURS: ICD-10-PCS | Performed by: EMERGENCY MEDICINE

## 2019-04-08 PROCEDURE — 95819 EEG AWAKE AND ASLEEP: CPT | Performed by: PSYCHIATRY & NEUROLOGY

## 2019-04-08 PROCEDURE — 85025 COMPLETE CBC W/AUTO DIFF WBC: CPT | Performed by: EMERGENCY MEDICINE

## 2019-04-08 PROCEDURE — 70496 CT ANGIOGRAPHY HEAD: CPT

## 2019-04-08 PROCEDURE — 99291 CRITICAL CARE FIRST HOUR: CPT | Performed by: ANESTHESIOLOGY

## 2019-04-08 PROCEDURE — 81003 URINALYSIS AUTO W/O SCOPE: CPT | Performed by: EMERGENCY MEDICINE

## 2019-04-08 PROCEDURE — 87081 CULTURE SCREEN ONLY: CPT | Performed by: NURSE PRACTITIONER

## 2019-04-08 PROCEDURE — 85652 RBC SED RATE AUTOMATED: CPT | Performed by: PSYCHIATRY & NEUROLOGY

## 2019-04-08 PROCEDURE — 94760 N-INVAS EAR/PLS OXIMETRY 1: CPT

## 2019-04-08 PROCEDURE — 0BH17EZ INSERTION OF ENDOTRACHEAL AIRWAY INTO TRACHEA, VIA NATURAL OR ARTIFICIAL OPENING: ICD-10-PCS | Performed by: EMERGENCY MEDICINE

## 2019-04-08 PROCEDURE — 80307 DRUG TEST PRSMV CHEM ANLYZR: CPT | Performed by: EMERGENCY MEDICINE

## 2019-04-08 PROCEDURE — 74176 CT ABD & PELVIS W/O CONTRAST: CPT

## 2019-04-08 PROCEDURE — 93010 ELECTROCARDIOGRAM REPORT: CPT | Performed by: INTERNAL MEDICINE

## 2019-04-08 PROCEDURE — 87040 BLOOD CULTURE FOR BACTERIA: CPT | Performed by: EMERGENCY MEDICINE

## 2019-04-08 PROCEDURE — 36415 COLL VENOUS BLD VENIPUNCTURE: CPT | Performed by: EMERGENCY MEDICINE

## 2019-04-08 PROCEDURE — 99291 CRITICAL CARE FIRST HOUR: CPT

## 2019-04-08 PROCEDURE — 70498 CT ANGIOGRAPHY NECK: CPT

## 2019-04-08 PROCEDURE — 80329 ANALGESICS NON-OPIOID 1 OR 2: CPT | Performed by: EMERGENCY MEDICINE

## 2019-04-08 PROCEDURE — 83605 ASSAY OF LACTIC ACID: CPT | Performed by: EMERGENCY MEDICINE

## 2019-04-08 PROCEDURE — 99254 IP/OBS CNSLTJ NEW/EST MOD 60: CPT | Performed by: PSYCHIATRY & NEUROLOGY

## 2019-04-08 PROCEDURE — 71250 CT THORAX DX C-: CPT

## 2019-04-08 PROCEDURE — 84484 ASSAY OF TROPONIN QUANT: CPT | Performed by: EMERGENCY MEDICINE

## 2019-04-08 PROCEDURE — 93005 ELECTROCARDIOGRAM TRACING: CPT

## 2019-04-08 PROCEDURE — 82553 CREATINE MB FRACTION: CPT | Performed by: PSYCHIATRY & NEUROLOGY

## 2019-04-08 PROCEDURE — 99292 CRITICAL CARE ADDL 30 MIN: CPT

## 2019-04-08 PROCEDURE — 94002 VENT MGMT INPAT INIT DAY: CPT

## 2019-04-08 PROCEDURE — 84443 ASSAY THYROID STIM HORMONE: CPT | Performed by: STUDENT IN AN ORGANIZED HEALTH CARE EDUCATION/TRAINING PROGRAM

## 2019-04-08 PROCEDURE — 70553 MRI BRAIN STEM W/O & W/DYE: CPT

## 2019-04-08 PROCEDURE — 84439 ASSAY OF FREE THYROXINE: CPT | Performed by: STUDENT IN AN ORGANIZED HEALTH CARE EDUCATION/TRAINING PROGRAM

## 2019-04-08 PROCEDURE — 95816 EEG AWAKE AND DROWSY: CPT

## 2019-04-08 PROCEDURE — 71045 X-RAY EXAM CHEST 1 VIEW: CPT

## 2019-04-08 PROCEDURE — 80053 COMPREHEN METABOLIC PANEL: CPT | Performed by: EMERGENCY MEDICINE

## 2019-04-08 PROCEDURE — 80320 DRUG SCREEN QUANTALCOHOLS: CPT | Performed by: EMERGENCY MEDICINE

## 2019-04-08 RX ORDER — MONTELUKAST SODIUM 10 MG/1
10 TABLET ORAL DAILY
Status: DISCONTINUED | OUTPATIENT
Start: 2019-04-08 | End: 2019-04-10 | Stop reason: HOSPADM

## 2019-04-08 RX ORDER — PROPOFOL 10 MG/ML
5-50 INJECTION, EMULSION INTRAVENOUS
Status: DISCONTINUED | OUTPATIENT
Start: 2019-04-08 | End: 2019-04-09

## 2019-04-08 RX ORDER — SUCRALFATE ORAL 1 G/10ML
1000 SUSPENSION ORAL
Status: DISCONTINUED | OUTPATIENT
Start: 2019-04-08 | End: 2019-04-10 | Stop reason: HOSPADM

## 2019-04-08 RX ORDER — PROPOFOL 10 MG/ML
5-50 INJECTION, EMULSION INTRAVENOUS
Status: DISCONTINUED | OUTPATIENT
Start: 2019-04-08 | End: 2019-04-08

## 2019-04-08 RX ORDER — CHLORHEXIDINE GLUCONATE 0.12 MG/ML
15 RINSE ORAL EVERY 12 HOURS SCHEDULED
Status: DISCONTINUED | OUTPATIENT
Start: 2019-04-08 | End: 2019-04-09

## 2019-04-08 RX ORDER — LEVOTHYROXINE SODIUM 175 UG/1
175 TABLET ORAL
Status: DISCONTINUED | OUTPATIENT
Start: 2019-04-08 | End: 2019-04-08

## 2019-04-08 RX ORDER — CHOLECALCIFEROL (VITAMIN D3) 125 MCG
1000 CAPSULE ORAL DAILY
Status: DISCONTINUED | OUTPATIENT
Start: 2019-04-08 | End: 2019-04-10 | Stop reason: HOSPADM

## 2019-04-08 RX ORDER — FOLIC ACID 1 MG/1
1000 TABLET ORAL DAILY
Status: DISCONTINUED | OUTPATIENT
Start: 2019-04-08 | End: 2019-04-10 | Stop reason: HOSPADM

## 2019-04-08 RX ORDER — FLUTICASONE PROPIONATE 50 MCG
2 SPRAY, SUSPENSION (ML) NASAL DAILY
Status: DISCONTINUED | OUTPATIENT
Start: 2019-04-08 | End: 2019-04-10 | Stop reason: HOSPADM

## 2019-04-08 RX ORDER — CALCITRIOL 0.25 UG/1
0.5 CAPSULE, LIQUID FILLED ORAL 2 TIMES DAILY
Status: DISCONTINUED | OUTPATIENT
Start: 2019-04-08 | End: 2019-04-10 | Stop reason: HOSPADM

## 2019-04-08 RX ORDER — SODIUM CHLORIDE, SODIUM GLUCONATE, SODIUM ACETATE, POTASSIUM CHLORIDE, MAGNESIUM CHLORIDE, SODIUM PHOSPHATE, DIBASIC, AND POTASSIUM PHOSPHATE .53; .5; .37; .037; .03; .012; .00082 G/100ML; G/100ML; G/100ML; G/100ML; G/100ML; G/100ML; G/100ML
100 INJECTION, SOLUTION INTRAVENOUS CONTINUOUS
Status: DISCONTINUED | OUTPATIENT
Start: 2019-04-08 | End: 2019-04-09

## 2019-04-08 RX ORDER — LORATADINE 10 MG/1
10 TABLET ORAL DAILY
Status: DISCONTINUED | OUTPATIENT
Start: 2019-04-08 | End: 2019-04-10 | Stop reason: HOSPADM

## 2019-04-08 RX ADMIN — ENOXAPARIN SODIUM 40 MG: 40 INJECTION SUBCUTANEOUS at 10:02

## 2019-04-08 RX ADMIN — SUCRALFATE 1000 MG: 1 SUSPENSION ORAL at 17:04

## 2019-04-08 RX ADMIN — CHLORHEXIDINE GLUCONATE 0.12% ORAL RINSE 15 ML: 1.2 LIQUID ORAL at 20:33

## 2019-04-08 RX ADMIN — PROPOFOL 10 MCG/KG/MIN: 10 INJECTION, EMULSION INTRAVENOUS at 07:25

## 2019-04-08 RX ADMIN — MAGNESIUM OXIDE TAB 400 MG (241.3 MG ELEMENTAL MG) 400 MG: 400 (241.3 MG) TAB at 10:02

## 2019-04-08 RX ADMIN — CYANOCOBALAMIN TAB 500 MCG 1000 MCG: 500 TAB at 10:02

## 2019-04-08 RX ADMIN — IOHEXOL 85 ML: 350 INJECTION, SOLUTION INTRAVENOUS at 11:28

## 2019-04-08 RX ADMIN — MAGNESIUM OXIDE TAB 400 MG (241.3 MG ELEMENTAL MG) 400 MG: 400 (241.3 MG) TAB at 17:05

## 2019-04-08 RX ADMIN — MONTELUKAST 10 MG: 10 TABLET, FILM COATED ORAL at 10:02

## 2019-04-08 RX ADMIN — LORATADINE 10 MG: 10 TABLET ORAL at 10:02

## 2019-04-08 RX ADMIN — FOLIC ACID 1000 MCG: 1 TABLET ORAL at 10:02

## 2019-04-08 RX ADMIN — FLUTICASONE PROPIONATE 2 SPRAY: 50 SPRAY, METERED NASAL at 09:22

## 2019-04-08 RX ADMIN — GADOBUTROL 8 ML: 604.72 INJECTION INTRAVENOUS at 17:50

## 2019-04-08 RX ADMIN — CHLORHEXIDINE GLUCONATE 0.12% ORAL RINSE 15 ML: 1.2 LIQUID ORAL at 10:02

## 2019-04-08 RX ADMIN — MAGNESIUM OXIDE TAB 400 MG (241.3 MG ELEMENTAL MG) 400 MG: 400 (241.3 MG) TAB at 20:33

## 2019-04-08 RX ADMIN — PIPERACILLIN SODIUM,TAZOBACTAM SODIUM 3.38 G: 3; .375 INJECTION, POWDER, FOR SOLUTION INTRAVENOUS at 08:19

## 2019-04-08 RX ADMIN — SODIUM CHLORIDE, SODIUM GLUCONATE, SODIUM ACETATE, POTASSIUM CHLORIDE, MAGNESIUM CHLORIDE, SODIUM PHOSPHATE, DIBASIC, AND POTASSIUM PHOSPHATE 100 ML/HR: .53; .5; .37; .037; .03; .012; .00082 INJECTION, SOLUTION INTRAVENOUS at 20:23

## 2019-04-08 RX ADMIN — SODIUM CHLORIDE, SODIUM GLUCONATE, SODIUM ACETATE, POTASSIUM CHLORIDE, MAGNESIUM CHLORIDE, SODIUM PHOSPHATE, DIBASIC, AND POTASSIUM PHOSPHATE 100 ML/HR: .53; .5; .37; .037; .03; .012; .00082 INJECTION, SOLUTION INTRAVENOUS at 09:20

## 2019-04-08 RX ADMIN — SUCRALFATE 1000 MG: 1 SUSPENSION ORAL at 23:27

## 2019-04-08 RX ADMIN — CALCITRIOL 0.5 MCG: 0.25 CAPSULE, LIQUID FILLED ORAL at 17:05

## 2019-04-08 RX ADMIN — PROPOFOL 20 MCG/KG/MIN: 10 INJECTION, EMULSION INTRAVENOUS at 08:10

## 2019-04-09 ENCOUNTER — TELEPHONE (OUTPATIENT)
Dept: FAMILY MEDICINE CLINIC | Facility: CLINIC | Age: 44
End: 2019-04-09

## 2019-04-09 ENCOUNTER — APPOINTMENT (INPATIENT)
Dept: RADIOLOGY | Facility: HOSPITAL | Age: 44
DRG: 034 | End: 2019-04-09
Payer: COMMERCIAL

## 2019-04-09 PROBLEM — R06.89 ACUTE RESPIRATORY INSUFFICIENCY: Status: RESOLVED | Noted: 2019-04-08 | Resolved: 2019-04-09

## 2019-04-09 LAB
ANION GAP SERPL CALCULATED.3IONS-SCNC: 10 MMOL/L (ref 4–13)
BASOPHILS # BLD AUTO: 0.02 THOUSANDS/ΜL (ref 0–0.1)
BASOPHILS NFR BLD AUTO: 0 % (ref 0–1)
BUN SERPL-MCNC: 15 MG/DL (ref 5–25)
CALCIUM SERPL-MCNC: 8 MG/DL (ref 8.3–10.1)
CHLORIDE SERPL-SCNC: 107 MMOL/L (ref 100–108)
CO2 SERPL-SCNC: 25 MMOL/L (ref 21–32)
CREAT SERPL-MCNC: 0.78 MG/DL (ref 0.6–1.3)
EOSINOPHIL # BLD AUTO: 0.03 THOUSAND/ΜL (ref 0–0.61)
EOSINOPHIL NFR BLD AUTO: 0 % (ref 0–6)
ERYTHROCYTE [DISTWIDTH] IN BLOOD BY AUTOMATED COUNT: 12.6 % (ref 11.6–15.1)
GFR SERPL CREATININE-BSD FRML MDRD: 93 ML/MIN/1.73SQ M
GLUCOSE SERPL-MCNC: 88 MG/DL (ref 65–140)
HCT VFR BLD AUTO: 35.3 % (ref 34.8–46.1)
HGB BLD-MCNC: 11.4 G/DL (ref 11.5–15.4)
IMM GRANULOCYTES # BLD AUTO: 0.06 THOUSAND/UL (ref 0–0.2)
IMM GRANULOCYTES NFR BLD AUTO: 1 % (ref 0–2)
LYMPHOCYTES # BLD AUTO: 1.19 THOUSANDS/ΜL (ref 0.6–4.47)
LYMPHOCYTES NFR BLD AUTO: 11 % (ref 14–44)
MAGNESIUM SERPL-MCNC: 2.1 MG/DL (ref 1.6–2.6)
MCH RBC QN AUTO: 30.8 PG (ref 26.8–34.3)
MCHC RBC AUTO-ENTMCNC: 32.3 G/DL (ref 31.4–37.4)
MCV RBC AUTO: 95 FL (ref 82–98)
MONOCYTES # BLD AUTO: 0.54 THOUSAND/ΜL (ref 0.17–1.22)
MONOCYTES NFR BLD AUTO: 5 % (ref 4–12)
MRSA NOSE QL CULT: NORMAL
NEUTROPHILS # BLD AUTO: 9.37 THOUSANDS/ΜL (ref 1.85–7.62)
NEUTS SEG NFR BLD AUTO: 83 % (ref 43–75)
NRBC BLD AUTO-RTO: 0 /100 WBCS
PHOSPHATE SERPL-MCNC: 3.3 MG/DL (ref 2.7–4.5)
PLATELET # BLD AUTO: 231 THOUSANDS/UL (ref 149–390)
PMV BLD AUTO: 10.4 FL (ref 8.9–12.7)
POTASSIUM SERPL-SCNC: 3.8 MMOL/L (ref 3.5–5.3)
RBC # BLD AUTO: 3.7 MILLION/UL (ref 3.81–5.12)
SODIUM SERPL-SCNC: 142 MMOL/L (ref 136–145)
WBC # BLD AUTO: 11.21 THOUSAND/UL (ref 4.31–10.16)

## 2019-04-09 PROCEDURE — 85025 COMPLETE CBC W/AUTO DIFF WBC: CPT | Performed by: NURSE PRACTITIONER

## 2019-04-09 PROCEDURE — 99232 SBSQ HOSP IP/OBS MODERATE 35: CPT | Performed by: PSYCHIATRY & NEUROLOGY

## 2019-04-09 PROCEDURE — 84100 ASSAY OF PHOSPHORUS: CPT | Performed by: NURSE PRACTITIONER

## 2019-04-09 PROCEDURE — 73610 X-RAY EXAM OF ANKLE: CPT

## 2019-04-09 PROCEDURE — 83735 ASSAY OF MAGNESIUM: CPT | Performed by: NURSE PRACTITIONER

## 2019-04-09 PROCEDURE — 71045 X-RAY EXAM CHEST 1 VIEW: CPT

## 2019-04-09 PROCEDURE — NC001 PR NO CHARGE: Performed by: ANESTHESIOLOGY

## 2019-04-09 PROCEDURE — 80048 BASIC METABOLIC PNL TOTAL CA: CPT | Performed by: NURSE PRACTITIONER

## 2019-04-09 PROCEDURE — 99232 SBSQ HOSP IP/OBS MODERATE 35: CPT | Performed by: ANESTHESIOLOGY

## 2019-04-09 PROCEDURE — 99253 IP/OBS CNSLTJ NEW/EST LOW 45: CPT | Performed by: PSYCHIATRY & NEUROLOGY

## 2019-04-09 PROCEDURE — 73630 X-RAY EXAM OF FOOT: CPT

## 2019-04-09 RX ORDER — DIPHENHYDRAMINE HCL 25 MG
25 TABLET ORAL EVERY 6 HOURS PRN
Status: DISCONTINUED | OUTPATIENT
Start: 2019-04-09 | End: 2019-04-10 | Stop reason: HOSPADM

## 2019-04-09 RX ORDER — CLONAZEPAM 1 MG/1
1 TABLET ORAL
Status: DISCONTINUED | OUTPATIENT
Start: 2019-04-09 | End: 2019-04-10 | Stop reason: HOSPADM

## 2019-04-09 RX ORDER — ACETAMINOPHEN 325 MG/1
650 TABLET ORAL EVERY 6 HOURS PRN
Status: DISCONTINUED | OUTPATIENT
Start: 2019-04-09 | End: 2019-04-10 | Stop reason: HOSPADM

## 2019-04-09 RX ORDER — TOPIRAMATE 25 MG/1
50 TABLET ORAL DAILY
Status: DISCONTINUED | OUTPATIENT
Start: 2019-04-09 | End: 2019-04-09

## 2019-04-09 RX ORDER — DESVENLAFAXINE 50 MG/1
50 TABLET, EXTENDED RELEASE ORAL DAILY
Status: DISCONTINUED | OUTPATIENT
Start: 2019-04-09 | End: 2019-04-10 | Stop reason: HOSPADM

## 2019-04-09 RX ORDER — POTASSIUM CHLORIDE 20 MEQ/1
20 TABLET, EXTENDED RELEASE ORAL ONCE
Status: COMPLETED | OUTPATIENT
Start: 2019-04-09 | End: 2019-04-09

## 2019-04-09 RX ORDER — TOPIRAMATE 25 MG/1
50 TABLET ORAL
Status: DISCONTINUED | OUTPATIENT
Start: 2019-04-09 | End: 2019-04-10 | Stop reason: HOSPADM

## 2019-04-09 RX ADMIN — SUCRALFATE 1000 MG: 1 SUSPENSION ORAL at 06:41

## 2019-04-09 RX ADMIN — LORATADINE 10 MG: 10 TABLET ORAL at 09:28

## 2019-04-09 RX ADMIN — FOLIC ACID 1000 MCG: 1 TABLET ORAL at 09:28

## 2019-04-09 RX ADMIN — ENOXAPARIN SODIUM 40 MG: 40 INJECTION SUBCUTANEOUS at 09:32

## 2019-04-09 RX ADMIN — MAGNESIUM OXIDE TAB 400 MG (241.3 MG ELEMENTAL MG) 400 MG: 400 (241.3 MG) TAB at 16:27

## 2019-04-09 RX ADMIN — DIPHENHYDRAMINE HCL 25 MG: 25 TABLET ORAL at 15:43

## 2019-04-09 RX ADMIN — SUCRALFATE 1000 MG: 1 SUSPENSION ORAL at 16:27

## 2019-04-09 RX ADMIN — Medication 1 TABLET: at 09:28

## 2019-04-09 RX ADMIN — CLONAZEPAM 1 MG: 1 TABLET ORAL at 21:55

## 2019-04-09 RX ADMIN — FLUTICASONE PROPIONATE 2 SPRAY: 50 SPRAY, METERED NASAL at 09:27

## 2019-04-09 RX ADMIN — ACETAMINOPHEN 650 MG: 325 TABLET, FILM COATED ORAL at 11:26

## 2019-04-09 RX ADMIN — SODIUM CHLORIDE, SODIUM GLUCONATE, SODIUM ACETATE, POTASSIUM CHLORIDE, MAGNESIUM CHLORIDE, SODIUM PHOSPHATE, DIBASIC, AND POTASSIUM PHOSPHATE 100 ML/HR: .53; .5; .37; .037; .03; .012; .00082 INJECTION, SOLUTION INTRAVENOUS at 06:41

## 2019-04-09 RX ADMIN — POTASSIUM CHLORIDE 20 MEQ: 1500 TABLET, EXTENDED RELEASE ORAL at 08:23

## 2019-04-09 RX ADMIN — SUCRALFATE 1000 MG: 1 SUSPENSION ORAL at 21:55

## 2019-04-09 RX ADMIN — DESVENLAFAXINE 50 MG: 50 TABLET, FILM COATED, EXTENDED RELEASE ORAL at 10:59

## 2019-04-09 RX ADMIN — SUCRALFATE 1000 MG: 1 SUSPENSION ORAL at 11:26

## 2019-04-09 RX ADMIN — CALCITRIOL 0.5 MCG: 0.25 CAPSULE, LIQUID FILLED ORAL at 09:26

## 2019-04-09 RX ADMIN — TOPIRAMATE 50 MG: 25 TABLET, FILM COATED ORAL at 21:55

## 2019-04-09 RX ADMIN — DIPHENHYDRAMINE HCL 25 MG: 25 TABLET ORAL at 05:07

## 2019-04-09 RX ADMIN — MAGNESIUM OXIDE TAB 400 MG (241.3 MG ELEMENTAL MG) 400 MG: 400 (241.3 MG) TAB at 20:41

## 2019-04-09 RX ADMIN — MAGNESIUM OXIDE TAB 400 MG (241.3 MG ELEMENTAL MG) 400 MG: 400 (241.3 MG) TAB at 09:29

## 2019-04-09 RX ADMIN — MONTELUKAST 10 MG: 10 TABLET, FILM COATED ORAL at 09:29

## 2019-04-09 RX ADMIN — CYANOCOBALAMIN TAB 500 MCG 1000 MCG: 500 TAB at 09:27

## 2019-04-09 RX ADMIN — LEVOTHYROXINE SODIUM 175 MCG: 150 TABLET ORAL at 05:01

## 2019-04-09 RX ADMIN — CALCITRIOL 0.5 MCG: 0.25 CAPSULE, LIQUID FILLED ORAL at 17:50

## 2019-04-10 VITALS
WEIGHT: 179.45 LBS | HEART RATE: 82 BPM | TEMPERATURE: 98.7 F | HEIGHT: 67 IN | SYSTOLIC BLOOD PRESSURE: 112 MMHG | OXYGEN SATURATION: 97 % | BODY MASS INDEX: 28.17 KG/M2 | DIASTOLIC BLOOD PRESSURE: 59 MMHG | RESPIRATION RATE: 18 BRPM

## 2019-04-10 PROBLEM — M79.671 RIGHT FOOT PAIN: Status: ACTIVE | Noted: 2019-04-10

## 2019-04-10 PROBLEM — M62.82 RHABDOMYOLYSIS: Status: ACTIVE | Noted: 2019-04-10

## 2019-04-10 LAB
ANION GAP SERPL CALCULATED.3IONS-SCNC: 8 MMOL/L (ref 4–13)
BASOPHILS # BLD AUTO: 0.03 THOUSANDS/ΜL (ref 0–0.1)
BASOPHILS NFR BLD AUTO: 0 % (ref 0–1)
BUN SERPL-MCNC: 13 MG/DL (ref 5–25)
CALCIUM SERPL-MCNC: 7.8 MG/DL (ref 8.3–10.1)
CHLORIDE SERPL-SCNC: 109 MMOL/L (ref 100–108)
CK MB SERPL-MCNC: 0.7 NG/ML (ref 0–5)
CK MB SERPL-MCNC: <1 % (ref 0–2.5)
CK SERPL-CCNC: 911 U/L (ref 26–192)
CO2 SERPL-SCNC: 26 MMOL/L (ref 21–32)
CREAT SERPL-MCNC: 0.75 MG/DL (ref 0.6–1.3)
EOSINOPHIL # BLD AUTO: 0.12 THOUSAND/ΜL (ref 0–0.61)
EOSINOPHIL NFR BLD AUTO: 2 % (ref 0–6)
ERYTHROCYTE [DISTWIDTH] IN BLOOD BY AUTOMATED COUNT: 12.4 % (ref 11.6–15.1)
GFR SERPL CREATININE-BSD FRML MDRD: 98 ML/MIN/1.73SQ M
GLUCOSE SERPL-MCNC: 83 MG/DL (ref 65–140)
HCT VFR BLD AUTO: 31.8 % (ref 34.8–46.1)
HGB BLD-MCNC: 10.6 G/DL (ref 11.5–15.4)
IMM GRANULOCYTES # BLD AUTO: 0.03 THOUSAND/UL (ref 0–0.2)
IMM GRANULOCYTES NFR BLD AUTO: 0 % (ref 0–2)
LYMPHOCYTES # BLD AUTO: 1.04 THOUSANDS/ΜL (ref 0.6–4.47)
LYMPHOCYTES NFR BLD AUTO: 14 % (ref 14–44)
MAGNESIUM SERPL-MCNC: 1.8 MG/DL (ref 1.6–2.6)
MCH RBC QN AUTO: 31.7 PG (ref 26.8–34.3)
MCHC RBC AUTO-ENTMCNC: 33.3 G/DL (ref 31.4–37.4)
MCV RBC AUTO: 95 FL (ref 82–98)
MONOCYTES # BLD AUTO: 0.49 THOUSAND/ΜL (ref 0.17–1.22)
MONOCYTES NFR BLD AUTO: 6 % (ref 4–12)
NEUTROPHILS # BLD AUTO: 6 THOUSANDS/ΜL (ref 1.85–7.62)
NEUTS SEG NFR BLD AUTO: 78 % (ref 43–75)
NRBC BLD AUTO-RTO: 0 /100 WBCS
PHOSPHATE SERPL-MCNC: 3.5 MG/DL (ref 2.7–4.5)
PLATELET # BLD AUTO: 227 THOUSANDS/UL (ref 149–390)
PMV BLD AUTO: 10.1 FL (ref 8.9–12.7)
POTASSIUM SERPL-SCNC: 3.3 MMOL/L (ref 3.5–5.3)
RBC # BLD AUTO: 3.34 MILLION/UL (ref 3.81–5.12)
SODIUM SERPL-SCNC: 143 MMOL/L (ref 136–145)
WBC # BLD AUTO: 7.71 THOUSAND/UL (ref 4.31–10.16)

## 2019-04-10 PROCEDURE — 99253 IP/OBS CNSLTJ NEW/EST LOW 45: CPT | Performed by: PHYSICIAN ASSISTANT

## 2019-04-10 PROCEDURE — 83735 ASSAY OF MAGNESIUM: CPT | Performed by: STUDENT IN AN ORGANIZED HEALTH CARE EDUCATION/TRAINING PROGRAM

## 2019-04-10 PROCEDURE — 82553 CREATINE MB FRACTION: CPT | Performed by: STUDENT IN AN ORGANIZED HEALTH CARE EDUCATION/TRAINING PROGRAM

## 2019-04-10 PROCEDURE — 99239 HOSP IP/OBS DSCHRG MGMT >30: CPT | Performed by: FAMILY MEDICINE

## 2019-04-10 PROCEDURE — 80048 BASIC METABOLIC PNL TOTAL CA: CPT | Performed by: STUDENT IN AN ORGANIZED HEALTH CARE EDUCATION/TRAINING PROGRAM

## 2019-04-10 PROCEDURE — 84100 ASSAY OF PHOSPHORUS: CPT | Performed by: STUDENT IN AN ORGANIZED HEALTH CARE EDUCATION/TRAINING PROGRAM

## 2019-04-10 PROCEDURE — 85025 COMPLETE CBC W/AUTO DIFF WBC: CPT | Performed by: STUDENT IN AN ORGANIZED HEALTH CARE EDUCATION/TRAINING PROGRAM

## 2019-04-10 PROCEDURE — 82550 ASSAY OF CK (CPK): CPT | Performed by: STUDENT IN AN ORGANIZED HEALTH CARE EDUCATION/TRAINING PROGRAM

## 2019-04-10 PROCEDURE — 99221 1ST HOSP IP/OBS SF/LOW 40: CPT | Performed by: PSYCHIATRY & NEUROLOGY

## 2019-04-10 RX ORDER — POTASSIUM CHLORIDE 20 MEQ/1
40 TABLET, EXTENDED RELEASE ORAL ONCE
Status: COMPLETED | OUTPATIENT
Start: 2019-04-10 | End: 2019-04-10

## 2019-04-10 RX ORDER — SODIUM CHLORIDE 9 MG/ML
125 INJECTION, SOLUTION INTRAVENOUS CONTINUOUS
Status: DISCONTINUED | OUTPATIENT
Start: 2019-04-10 | End: 2019-04-10 | Stop reason: HOSPADM

## 2019-04-10 RX ORDER — B-COMPLEX WITH VITAMIN C
1 TABLET ORAL 2 TIMES DAILY WITH MEALS
Status: DISCONTINUED | OUTPATIENT
Start: 2019-04-10 | End: 2019-04-10 | Stop reason: HOSPADM

## 2019-04-10 RX ORDER — DOCUSATE SODIUM 100 MG/1
100 CAPSULE, LIQUID FILLED ORAL 2 TIMES DAILY
Status: DISCONTINUED | OUTPATIENT
Start: 2019-04-10 | End: 2019-04-10 | Stop reason: HOSPADM

## 2019-04-10 RX ADMIN — POTASSIUM CHLORIDE 40 MEQ: 1500 TABLET, EXTENDED RELEASE ORAL at 10:27

## 2019-04-10 RX ADMIN — MAGNESIUM OXIDE TAB 400 MG (241.3 MG ELEMENTAL MG) 400 MG: 400 (241.3 MG) TAB at 15:56

## 2019-04-10 RX ADMIN — DESVENLAFAXINE 50 MG: 50 TABLET, FILM COATED, EXTENDED RELEASE ORAL at 10:28

## 2019-04-10 RX ADMIN — SUCRALFATE 1000 MG: 1 SUSPENSION ORAL at 11:04

## 2019-04-10 RX ADMIN — MONTELUKAST 10 MG: 10 TABLET, FILM COATED ORAL at 09:15

## 2019-04-10 RX ADMIN — FOLIC ACID 1000 MCG: 1 TABLET ORAL at 09:14

## 2019-04-10 RX ADMIN — MAGNESIUM OXIDE TAB 400 MG (241.3 MG ELEMENTAL MG) 400 MG: 400 (241.3 MG) TAB at 09:14

## 2019-04-10 RX ADMIN — Medication 1 TABLET: at 09:14

## 2019-04-10 RX ADMIN — SODIUM CHLORIDE 125 ML/HR: 0.9 INJECTION, SOLUTION INTRAVENOUS at 10:27

## 2019-04-10 RX ADMIN — CALCITRIOL 0.5 MCG: 0.25 CAPSULE, LIQUID FILLED ORAL at 09:14

## 2019-04-10 RX ADMIN — SUCRALFATE 1000 MG: 1 SUSPENSION ORAL at 15:56

## 2019-04-10 RX ADMIN — ENOXAPARIN SODIUM 40 MG: 40 INJECTION SUBCUTANEOUS at 09:14

## 2019-04-10 RX ADMIN — DIPHENHYDRAMINE HCL 25 MG: 25 TABLET ORAL at 11:04

## 2019-04-10 RX ADMIN — DOCUSATE SODIUM 100 MG: 100 CAPSULE, LIQUID FILLED ORAL at 17:17

## 2019-04-10 RX ADMIN — LORATADINE 10 MG: 10 TABLET ORAL at 09:14

## 2019-04-10 RX ADMIN — CALCIUM CARBONATE-VITAMIN D TAB 500 MG-200 UNIT 1 TABLET: 500-200 TAB at 15:57

## 2019-04-10 RX ADMIN — FLUTICASONE PROPIONATE 2 SPRAY: 50 SPRAY, METERED NASAL at 10:28

## 2019-04-10 RX ADMIN — CALCITRIOL 0.5 MCG: 0.25 CAPSULE, LIQUID FILLED ORAL at 17:17

## 2019-04-10 RX ADMIN — SUCRALFATE 1000 MG: 1 SUSPENSION ORAL at 06:16

## 2019-04-10 RX ADMIN — CYANOCOBALAMIN TAB 500 MCG 1000 MCG: 500 TAB at 09:14

## 2019-04-10 RX ADMIN — LEVOTHYROXINE SODIUM 175 MCG: 150 TABLET ORAL at 06:16

## 2019-04-10 RX ADMIN — DOCUSATE SODIUM 100 MG: 100 CAPSULE, LIQUID FILLED ORAL at 10:27

## 2019-04-11 ENCOUNTER — TELEPHONE (OUTPATIENT)
Dept: FAMILY MEDICINE CLINIC | Facility: CLINIC | Age: 44
End: 2019-04-11

## 2019-04-11 ENCOUNTER — TRANSITIONAL CARE MANAGEMENT (OUTPATIENT)
Dept: FAMILY MEDICINE CLINIC | Facility: CLINIC | Age: 44
End: 2019-04-11

## 2019-04-13 LAB
BACTERIA BLD CULT: NORMAL
BACTERIA BLD CULT: NORMAL

## 2019-04-14 DIAGNOSIS — M79.7 FIBROMYALGIA: ICD-10-CM

## 2019-04-14 DIAGNOSIS — M54.50 CHRONIC BILATERAL LOW BACK PAIN WITHOUT SCIATICA: ICD-10-CM

## 2019-04-14 DIAGNOSIS — Z98.84 S/P GASTRIC BYPASS: ICD-10-CM

## 2019-04-14 DIAGNOSIS — G89.29 CHRONIC BILATERAL LOW BACK PAIN WITHOUT SCIATICA: ICD-10-CM

## 2019-04-14 RX ORDER — FOLIC ACID 1 MG/1
1000 TABLET ORAL DAILY
Qty: 30 TABLET | Refills: 0 | Status: CANCELLED | OUTPATIENT
Start: 2019-04-14

## 2019-04-15 ENCOUNTER — OFFICE VISIT (OUTPATIENT)
Dept: FAMILY MEDICINE CLINIC | Facility: CLINIC | Age: 44
End: 2019-04-15
Payer: COMMERCIAL

## 2019-04-15 VITALS
RESPIRATION RATE: 16 BRPM | TEMPERATURE: 97.9 F | WEIGHT: 178 LBS | DIASTOLIC BLOOD PRESSURE: 60 MMHG | HEIGHT: 67 IN | HEART RATE: 108 BPM | SYSTOLIC BLOOD PRESSURE: 110 MMHG | BODY MASS INDEX: 27.94 KG/M2

## 2019-04-15 DIAGNOSIS — T50.901D ACCIDENTAL DRUG OVERDOSE, SUBSEQUENT ENCOUNTER: ICD-10-CM

## 2019-04-15 DIAGNOSIS — Z98.84 S/P GASTRIC BYPASS: ICD-10-CM

## 2019-04-15 DIAGNOSIS — M54.50 CHRONIC BILATERAL LOW BACK PAIN WITHOUT SCIATICA: ICD-10-CM

## 2019-04-15 DIAGNOSIS — M79.7 FIBROMYALGIA: ICD-10-CM

## 2019-04-15 DIAGNOSIS — M62.82 NON-TRAUMATIC RHABDOMYOLYSIS: ICD-10-CM

## 2019-04-15 DIAGNOSIS — Z09 HOSPITAL DISCHARGE FOLLOW-UP: Primary | ICD-10-CM

## 2019-04-15 DIAGNOSIS — G89.29 CHRONIC BILATERAL LOW BACK PAIN WITHOUT SCIATICA: ICD-10-CM

## 2019-04-15 DIAGNOSIS — M72.2 PLANTAR FASCIITIS: ICD-10-CM

## 2019-04-15 DIAGNOSIS — Z79.899 POLYPHARMACY: ICD-10-CM

## 2019-04-15 PROCEDURE — 99495 TRANSJ CARE MGMT MOD F2F 14D: CPT | Performed by: NURSE PRACTITIONER

## 2019-04-15 RX ORDER — FOLIC ACID 1 MG/1
1000 TABLET ORAL DAILY
Qty: 30 TABLET | Refills: 6 | Status: SHIPPED | OUTPATIENT
Start: 2019-04-15

## 2019-04-18 ENCOUNTER — OFFICE VISIT (OUTPATIENT)
Dept: OBGYN CLINIC | Facility: CLINIC | Age: 44
End: 2019-04-18
Payer: COMMERCIAL

## 2019-04-18 VITALS
HEIGHT: 66 IN | WEIGHT: 177 LBS | HEART RATE: 60 BPM | SYSTOLIC BLOOD PRESSURE: 111 MMHG | BODY MASS INDEX: 28.45 KG/M2 | DIASTOLIC BLOOD PRESSURE: 84 MMHG

## 2019-04-18 DIAGNOSIS — M72.2 PLANTAR FASCIITIS: ICD-10-CM

## 2019-04-18 PROCEDURE — 99214 OFFICE O/P EST MOD 30 MIN: CPT | Performed by: ORTHOPAEDIC SURGERY

## 2019-04-22 ENCOUNTER — OFFICE VISIT (OUTPATIENT)
Dept: FAMILY MEDICINE CLINIC | Facility: CLINIC | Age: 44
End: 2019-04-22
Payer: COMMERCIAL

## 2019-04-22 VITALS
BODY MASS INDEX: 28.12 KG/M2 | HEART RATE: 72 BPM | HEIGHT: 66 IN | RESPIRATION RATE: 14 BRPM | DIASTOLIC BLOOD PRESSURE: 50 MMHG | SYSTOLIC BLOOD PRESSURE: 90 MMHG | WEIGHT: 175 LBS | TEMPERATURE: 97.2 F

## 2019-04-22 DIAGNOSIS — M54.41 CHRONIC BILATERAL LOW BACK PAIN WITH RIGHT-SIDED SCIATICA: Primary | ICD-10-CM

## 2019-04-22 DIAGNOSIS — R41.82 ALTERED MENTAL STATUS, UNSPECIFIED ALTERED MENTAL STATUS TYPE: ICD-10-CM

## 2019-04-22 DIAGNOSIS — G89.29 CHRONIC BILATERAL LOW BACK PAIN WITH RIGHT-SIDED SCIATICA: Primary | ICD-10-CM

## 2019-04-22 DIAGNOSIS — G43.719 INTRACTABLE CHRONIC MIGRAINE WITHOUT AURA AND WITHOUT STATUS MIGRAINOSUS: ICD-10-CM

## 2019-04-22 PROBLEM — F32.A DEPRESSION: Status: ACTIVE | Noted: 2019-04-22

## 2019-04-22 PROCEDURE — 99213 OFFICE O/P EST LOW 20 MIN: CPT | Performed by: NURSE PRACTITIONER

## 2019-04-29 ENCOUNTER — TELEPHONE (OUTPATIENT)
Dept: FAMILY MEDICINE CLINIC | Facility: CLINIC | Age: 44
End: 2019-04-29

## 2019-04-29 ENCOUNTER — EVALUATION (OUTPATIENT)
Dept: PHYSICAL THERAPY | Facility: CLINIC | Age: 44
End: 2019-04-29
Payer: COMMERCIAL

## 2019-04-29 VITALS — DIASTOLIC BLOOD PRESSURE: 60 MMHG | SYSTOLIC BLOOD PRESSURE: 98 MMHG | HEART RATE: 83 BPM

## 2019-04-29 DIAGNOSIS — M54.41 CHRONIC BILATERAL LOW BACK PAIN WITH RIGHT-SIDED SCIATICA: ICD-10-CM

## 2019-04-29 DIAGNOSIS — G89.29 CHRONIC BILATERAL LOW BACK PAIN WITH RIGHT-SIDED SCIATICA: ICD-10-CM

## 2019-04-29 PROCEDURE — 97161 PT EVAL LOW COMPLEX 20 MIN: CPT | Performed by: PHYSICAL THERAPIST

## 2019-04-30 DIAGNOSIS — M79.7 FIBROMYALGIA: ICD-10-CM

## 2019-04-30 DIAGNOSIS — G89.29 CHRONIC BILATERAL LOW BACK PAIN WITHOUT SCIATICA: ICD-10-CM

## 2019-04-30 DIAGNOSIS — M54.50 CHRONIC BILATERAL LOW BACK PAIN WITHOUT SCIATICA: ICD-10-CM

## 2019-05-01 ENCOUNTER — DOCUMENTATION (OUTPATIENT)
Dept: FAMILY MEDICINE CLINIC | Facility: CLINIC | Age: 44
End: 2019-05-01

## 2019-05-01 ENCOUNTER — OFFICE VISIT (OUTPATIENT)
Dept: PHYSICAL THERAPY | Facility: CLINIC | Age: 44
End: 2019-05-01
Payer: COMMERCIAL

## 2019-05-01 DIAGNOSIS — B37.3 YEAST VAGINITIS: Primary | ICD-10-CM

## 2019-05-01 DIAGNOSIS — M54.41 CHRONIC BILATERAL LOW BACK PAIN WITH RIGHT-SIDED SCIATICA: Primary | ICD-10-CM

## 2019-05-01 DIAGNOSIS — M79.7 FIBROMYALGIA: ICD-10-CM

## 2019-05-01 DIAGNOSIS — G89.29 CHRONIC BILATERAL LOW BACK PAIN WITHOUT SCIATICA: ICD-10-CM

## 2019-05-01 DIAGNOSIS — G89.29 CHRONIC BILATERAL LOW BACK PAIN WITH RIGHT-SIDED SCIATICA: Primary | ICD-10-CM

## 2019-05-01 DIAGNOSIS — M54.50 CHRONIC BILATERAL LOW BACK PAIN WITHOUT SCIATICA: ICD-10-CM

## 2019-05-01 PROCEDURE — 97112 NEUROMUSCULAR REEDUCATION: CPT

## 2019-05-01 PROCEDURE — 97110 THERAPEUTIC EXERCISES: CPT

## 2019-05-01 RX ORDER — CYCLOBENZAPRINE HCL 5 MG
TABLET ORAL
Qty: 30 TABLET | Refills: 3 | OUTPATIENT
Start: 2019-05-01

## 2019-05-01 RX ORDER — FLUCONAZOLE 150 MG/1
150 TABLET ORAL ONCE
Qty: 1 TABLET | Refills: 1 | Status: SHIPPED | OUTPATIENT
Start: 2019-05-01 | End: 2019-05-01

## 2019-05-06 ENCOUNTER — OFFICE VISIT (OUTPATIENT)
Dept: PHYSICAL THERAPY | Facility: CLINIC | Age: 44
End: 2019-05-06
Payer: COMMERCIAL

## 2019-05-06 DIAGNOSIS — M54.41 CHRONIC BILATERAL LOW BACK PAIN WITH RIGHT-SIDED SCIATICA: Primary | ICD-10-CM

## 2019-05-06 DIAGNOSIS — G89.29 CHRONIC BILATERAL LOW BACK PAIN WITH RIGHT-SIDED SCIATICA: Primary | ICD-10-CM

## 2019-05-06 PROCEDURE — 97112 NEUROMUSCULAR REEDUCATION: CPT | Performed by: PHYSICAL THERAPIST

## 2019-05-06 PROCEDURE — 97110 THERAPEUTIC EXERCISES: CPT | Performed by: PHYSICAL THERAPIST

## 2019-05-08 ENCOUNTER — OFFICE VISIT (OUTPATIENT)
Dept: FAMILY MEDICINE CLINIC | Facility: CLINIC | Age: 44
End: 2019-05-08
Payer: COMMERCIAL

## 2019-05-08 ENCOUNTER — OFFICE VISIT (OUTPATIENT)
Dept: PHYSICAL THERAPY | Facility: CLINIC | Age: 44
End: 2019-05-08
Payer: COMMERCIAL

## 2019-05-08 VITALS
TEMPERATURE: 98.1 F | BODY MASS INDEX: 28.25 KG/M2 | HEIGHT: 66 IN | WEIGHT: 175.8 LBS | SYSTOLIC BLOOD PRESSURE: 100 MMHG | RESPIRATION RATE: 14 BRPM | HEART RATE: 76 BPM | DIASTOLIC BLOOD PRESSURE: 72 MMHG

## 2019-05-08 DIAGNOSIS — M54.41 CHRONIC BILATERAL LOW BACK PAIN WITH RIGHT-SIDED SCIATICA: Primary | ICD-10-CM

## 2019-05-08 DIAGNOSIS — G89.29 CHRONIC LEFT-SIDED LOW BACK PAIN WITH LEFT-SIDED SCIATICA: ICD-10-CM

## 2019-05-08 DIAGNOSIS — M54.42 CHRONIC LEFT-SIDED LOW BACK PAIN WITH LEFT-SIDED SCIATICA: ICD-10-CM

## 2019-05-08 DIAGNOSIS — Z09 FOLLOW UP: Primary | ICD-10-CM

## 2019-05-08 DIAGNOSIS — G89.29 CHRONIC BILATERAL LOW BACK PAIN WITH RIGHT-SIDED SCIATICA: Primary | ICD-10-CM

## 2019-05-08 DIAGNOSIS — T50.901D ACCIDENTAL DRUG OVERDOSE, SUBSEQUENT ENCOUNTER: ICD-10-CM

## 2019-05-08 PROCEDURE — 99213 OFFICE O/P EST LOW 20 MIN: CPT | Performed by: NURSE PRACTITIONER

## 2019-05-08 PROCEDURE — 97110 THERAPEUTIC EXERCISES: CPT

## 2019-05-08 PROCEDURE — 97112 NEUROMUSCULAR REEDUCATION: CPT

## 2019-05-09 ENCOUNTER — TELEPHONE (OUTPATIENT)
Dept: FAMILY MEDICINE CLINIC | Facility: CLINIC | Age: 44
End: 2019-05-09

## 2019-05-13 ENCOUNTER — OFFICE VISIT (OUTPATIENT)
Dept: PHYSICAL THERAPY | Facility: CLINIC | Age: 44
End: 2019-05-13
Payer: COMMERCIAL

## 2019-05-13 DIAGNOSIS — G89.29 CHRONIC BILATERAL LOW BACK PAIN WITH RIGHT-SIDED SCIATICA: Primary | ICD-10-CM

## 2019-05-13 DIAGNOSIS — M54.41 CHRONIC BILATERAL LOW BACK PAIN WITH RIGHT-SIDED SCIATICA: Primary | ICD-10-CM

## 2019-05-13 PROCEDURE — 97112 NEUROMUSCULAR REEDUCATION: CPT

## 2019-05-13 PROCEDURE — 97110 THERAPEUTIC EXERCISES: CPT

## 2019-05-15 ENCOUNTER — TELEPHONE (OUTPATIENT)
Dept: FAMILY MEDICINE CLINIC | Facility: CLINIC | Age: 44
End: 2019-05-15

## 2019-05-15 ENCOUNTER — APPOINTMENT (OUTPATIENT)
Dept: PHYSICAL THERAPY | Facility: CLINIC | Age: 44
End: 2019-05-15
Payer: COMMERCIAL

## 2019-05-17 ENCOUNTER — OFFICE VISIT (OUTPATIENT)
Dept: PHYSICAL THERAPY | Facility: CLINIC | Age: 44
End: 2019-05-17
Payer: COMMERCIAL

## 2019-05-17 DIAGNOSIS — M54.41 CHRONIC BILATERAL LOW BACK PAIN WITH RIGHT-SIDED SCIATICA: Primary | ICD-10-CM

## 2019-05-17 DIAGNOSIS — G89.29 CHRONIC BILATERAL LOW BACK PAIN WITH RIGHT-SIDED SCIATICA: Primary | ICD-10-CM

## 2019-05-17 PROCEDURE — 97110 THERAPEUTIC EXERCISES: CPT | Performed by: PHYSICAL THERAPIST

## 2019-05-17 PROCEDURE — 97112 NEUROMUSCULAR REEDUCATION: CPT | Performed by: PHYSICAL THERAPIST

## 2019-05-19 DIAGNOSIS — G89.29 CHRONIC BILATERAL LOW BACK PAIN WITHOUT SCIATICA: ICD-10-CM

## 2019-05-19 DIAGNOSIS — M54.50 CHRONIC BILATERAL LOW BACK PAIN WITHOUT SCIATICA: ICD-10-CM

## 2019-05-19 DIAGNOSIS — M79.7 FIBROMYALGIA: ICD-10-CM

## 2019-05-20 ENCOUNTER — APPOINTMENT (OUTPATIENT)
Dept: PHYSICAL THERAPY | Facility: CLINIC | Age: 44
End: 2019-05-20
Payer: COMMERCIAL

## 2019-05-20 ENCOUNTER — OFFICE VISIT (OUTPATIENT)
Dept: FAMILY MEDICINE CLINIC | Facility: CLINIC | Age: 44
End: 2019-05-20
Payer: COMMERCIAL

## 2019-05-20 VITALS
BODY MASS INDEX: 28.77 KG/M2 | HEART RATE: 84 BPM | WEIGHT: 179 LBS | RESPIRATION RATE: 14 BRPM | DIASTOLIC BLOOD PRESSURE: 62 MMHG | SYSTOLIC BLOOD PRESSURE: 102 MMHG | HEIGHT: 66 IN | TEMPERATURE: 98.7 F

## 2019-05-20 DIAGNOSIS — H66.92 LEFT ACUTE OTITIS MEDIA: Primary | ICD-10-CM

## 2019-05-20 DIAGNOSIS — M54.50 CHRONIC BILATERAL LOW BACK PAIN WITHOUT SCIATICA: ICD-10-CM

## 2019-05-20 DIAGNOSIS — G89.29 CHRONIC BILATERAL LOW BACK PAIN WITHOUT SCIATICA: ICD-10-CM

## 2019-05-20 DIAGNOSIS — B37.3 YEAST VAGINITIS: ICD-10-CM

## 2019-05-20 DIAGNOSIS — Z12.31 ENCOUNTER FOR SCREENING MAMMOGRAM FOR BREAST CANCER: ICD-10-CM

## 2019-05-20 DIAGNOSIS — M79.7 FIBROMYALGIA: ICD-10-CM

## 2019-05-20 PROCEDURE — 99213 OFFICE O/P EST LOW 20 MIN: CPT | Performed by: NURSE PRACTITIONER

## 2019-05-20 RX ORDER — FLUCONAZOLE 150 MG/1
TABLET ORAL
Qty: 2 TABLET | Refills: 1 | Status: SHIPPED | OUTPATIENT
Start: 2019-05-20 | End: 2019-05-25 | Stop reason: SDUPTHER

## 2019-05-20 RX ORDER — CHLORHEXIDINE GLUCONATE 0.12 MG/ML
RINSE ORAL
COMMUNITY
Start: 2019-05-09

## 2019-05-20 RX ORDER — AMOXICILLIN AND CLAVULANATE POTASSIUM 875; 125 MG/1; MG/1
1 TABLET, FILM COATED ORAL EVERY 12 HOURS SCHEDULED
Qty: 14 TABLET | Refills: 0 | Status: SHIPPED | OUTPATIENT
Start: 2019-05-20 | End: 2019-05-27

## 2019-05-22 ENCOUNTER — APPOINTMENT (OUTPATIENT)
Dept: PHYSICAL THERAPY | Facility: CLINIC | Age: 44
End: 2019-05-22
Payer: COMMERCIAL

## 2019-05-24 ENCOUNTER — OFFICE VISIT (OUTPATIENT)
Dept: PHYSICAL THERAPY | Facility: CLINIC | Age: 44
End: 2019-05-24
Payer: COMMERCIAL

## 2019-05-24 DIAGNOSIS — M54.41 CHRONIC BILATERAL LOW BACK PAIN WITH RIGHT-SIDED SCIATICA: Primary | ICD-10-CM

## 2019-05-24 DIAGNOSIS — G89.29 CHRONIC BILATERAL LOW BACK PAIN WITH RIGHT-SIDED SCIATICA: Primary | ICD-10-CM

## 2019-05-24 PROCEDURE — 97110 THERAPEUTIC EXERCISES: CPT

## 2019-05-24 PROCEDURE — 97112 NEUROMUSCULAR REEDUCATION: CPT

## 2019-05-25 DIAGNOSIS — B37.3 YEAST VAGINITIS: ICD-10-CM

## 2019-05-27 DIAGNOSIS — G43.909 MIGRAINE WITHOUT STATUS MIGRAINOSUS, NOT INTRACTABLE, UNSPECIFIED MIGRAINE TYPE: ICD-10-CM

## 2019-05-27 DIAGNOSIS — R05.8 ALLERGIC COUGH: ICD-10-CM

## 2019-05-27 RX ORDER — AMITRIPTYLINE HYDROCHLORIDE 75 MG/1
TABLET, FILM COATED ORAL
Qty: 30 TABLET | Refills: 3 | Status: SHIPPED | OUTPATIENT
Start: 2019-05-27 | End: 2019-06-14 | Stop reason: HOSPADM

## 2019-05-28 ENCOUNTER — TELEPHONE (OUTPATIENT)
Dept: FAMILY MEDICINE CLINIC | Facility: CLINIC | Age: 44
End: 2019-05-28

## 2019-05-28 RX ORDER — FLUCONAZOLE 150 MG/1
TABLET ORAL
Qty: 2 TABLET | Refills: 1 | Status: SHIPPED | OUTPATIENT
Start: 2019-05-28 | End: 2019-06-02 | Stop reason: SDUPTHER

## 2019-05-28 RX ORDER — MONTELUKAST SODIUM 10 MG/1
TABLET ORAL
Qty: 30 TABLET | Refills: 1 | Status: SHIPPED | OUTPATIENT
Start: 2019-05-28 | End: 2019-07-29 | Stop reason: SDUPTHER

## 2019-05-29 ENCOUNTER — OFFICE VISIT (OUTPATIENT)
Dept: PHYSICAL THERAPY | Facility: CLINIC | Age: 44
End: 2019-05-29
Payer: COMMERCIAL

## 2019-05-29 ENCOUNTER — OFFICE VISIT (OUTPATIENT)
Dept: FAMILY MEDICINE CLINIC | Facility: CLINIC | Age: 44
End: 2019-05-29
Payer: COMMERCIAL

## 2019-05-29 ENCOUNTER — TELEPHONE (OUTPATIENT)
Dept: FAMILY MEDICINE CLINIC | Facility: CLINIC | Age: 44
End: 2019-05-29

## 2019-05-29 VITALS
HEIGHT: 66 IN | DIASTOLIC BLOOD PRESSURE: 70 MMHG | TEMPERATURE: 98.3 F | SYSTOLIC BLOOD PRESSURE: 98 MMHG | HEART RATE: 76 BPM | BODY MASS INDEX: 28.9 KG/M2 | WEIGHT: 179.8 LBS | RESPIRATION RATE: 14 BRPM

## 2019-05-29 DIAGNOSIS — R41.82 ALTERED MENTAL STATUS, UNSPECIFIED ALTERED MENTAL STATUS TYPE: ICD-10-CM

## 2019-05-29 DIAGNOSIS — G89.29 CHRONIC BILATERAL LOW BACK PAIN WITH RIGHT-SIDED SCIATICA: ICD-10-CM

## 2019-05-29 DIAGNOSIS — T50.901D ACCIDENTAL DRUG OVERDOSE, SUBSEQUENT ENCOUNTER: Primary | ICD-10-CM

## 2019-05-29 DIAGNOSIS — M54.41 CHRONIC BILATERAL LOW BACK PAIN WITH RIGHT-SIDED SCIATICA: ICD-10-CM

## 2019-05-29 DIAGNOSIS — G89.29 CHRONIC BILATERAL LOW BACK PAIN WITH RIGHT-SIDED SCIATICA: Primary | ICD-10-CM

## 2019-05-29 DIAGNOSIS — M54.41 CHRONIC BILATERAL LOW BACK PAIN WITH RIGHT-SIDED SCIATICA: Primary | ICD-10-CM

## 2019-05-29 PROCEDURE — 97112 NEUROMUSCULAR REEDUCATION: CPT

## 2019-05-29 PROCEDURE — 99213 OFFICE O/P EST LOW 20 MIN: CPT | Performed by: NURSE PRACTITIONER

## 2019-05-29 PROCEDURE — 97110 THERAPEUTIC EXERCISES: CPT

## 2019-05-31 ENCOUNTER — OFFICE VISIT (OUTPATIENT)
Dept: PHYSICAL THERAPY | Facility: CLINIC | Age: 44
End: 2019-05-31
Payer: COMMERCIAL

## 2019-05-31 DIAGNOSIS — G89.29 CHRONIC BILATERAL LOW BACK PAIN WITH RIGHT-SIDED SCIATICA: Primary | ICD-10-CM

## 2019-05-31 DIAGNOSIS — M54.41 CHRONIC BILATERAL LOW BACK PAIN WITH RIGHT-SIDED SCIATICA: Primary | ICD-10-CM

## 2019-05-31 PROCEDURE — 97112 NEUROMUSCULAR REEDUCATION: CPT | Performed by: PHYSICAL THERAPIST

## 2019-05-31 PROCEDURE — 97110 THERAPEUTIC EXERCISES: CPT | Performed by: PHYSICAL THERAPIST

## 2019-06-02 DIAGNOSIS — B37.3 YEAST VAGINITIS: ICD-10-CM

## 2019-06-03 ENCOUNTER — OFFICE VISIT (OUTPATIENT)
Dept: PHYSICAL THERAPY | Facility: CLINIC | Age: 44
End: 2019-06-03
Payer: COMMERCIAL

## 2019-06-03 DIAGNOSIS — G89.29 CHRONIC BILATERAL LOW BACK PAIN WITH RIGHT-SIDED SCIATICA: Primary | ICD-10-CM

## 2019-06-03 DIAGNOSIS — M54.41 CHRONIC BILATERAL LOW BACK PAIN WITH RIGHT-SIDED SCIATICA: Primary | ICD-10-CM

## 2019-06-03 PROCEDURE — 97110 THERAPEUTIC EXERCISES: CPT

## 2019-06-03 PROCEDURE — 97112 NEUROMUSCULAR REEDUCATION: CPT

## 2019-06-03 RX ORDER — FLUCONAZOLE 150 MG/1
TABLET ORAL
Qty: 2 TABLET | Refills: 1 | Status: SHIPPED | OUTPATIENT
Start: 2019-06-03 | End: 2019-06-26

## 2019-06-04 ENCOUNTER — TELEPHONE (OUTPATIENT)
Dept: FAMILY MEDICINE CLINIC | Facility: CLINIC | Age: 44
End: 2019-06-04

## 2019-06-04 DIAGNOSIS — H66.92 LEFT ACUTE OTITIS MEDIA: Primary | ICD-10-CM

## 2019-06-05 ENCOUNTER — APPOINTMENT (OUTPATIENT)
Dept: PHYSICAL THERAPY | Facility: CLINIC | Age: 44
End: 2019-06-05
Payer: COMMERCIAL

## 2019-06-05 RX ORDER — AZITHROMYCIN 250 MG/1
TABLET, FILM COATED ORAL
Qty: 6 TABLET | Refills: 0 | Status: SHIPPED | OUTPATIENT
Start: 2019-06-05 | End: 2019-06-09

## 2019-06-09 DIAGNOSIS — G89.29 CHRONIC BILATERAL LOW BACK PAIN WITHOUT SCIATICA: ICD-10-CM

## 2019-06-09 DIAGNOSIS — J45.20 ASTHMA, ALLERGIC, MILD INTERMITTENT, UNCOMPLICATED: ICD-10-CM

## 2019-06-09 DIAGNOSIS — M54.50 CHRONIC BILATERAL LOW BACK PAIN WITHOUT SCIATICA: ICD-10-CM

## 2019-06-09 DIAGNOSIS — M79.7 FIBROMYALGIA: ICD-10-CM

## 2019-06-09 DIAGNOSIS — J30.1 SEASONAL ALLERGIC RHINITIS DUE TO POLLEN: ICD-10-CM

## 2019-06-10 ENCOUNTER — OFFICE VISIT (OUTPATIENT)
Dept: PHYSICAL THERAPY | Facility: CLINIC | Age: 44
End: 2019-06-10
Payer: COMMERCIAL

## 2019-06-10 DIAGNOSIS — G89.29 CHRONIC BILATERAL LOW BACK PAIN WITH RIGHT-SIDED SCIATICA: Primary | ICD-10-CM

## 2019-06-10 DIAGNOSIS — M54.41 CHRONIC BILATERAL LOW BACK PAIN WITH RIGHT-SIDED SCIATICA: Primary | ICD-10-CM

## 2019-06-10 PROCEDURE — 97112 NEUROMUSCULAR REEDUCATION: CPT

## 2019-06-10 PROCEDURE — 97110 THERAPEUTIC EXERCISES: CPT

## 2019-06-10 RX ORDER — ALBUTEROL SULFATE 90 UG/1
2 AEROSOL, METERED RESPIRATORY (INHALATION) EVERY 6 HOURS PRN
Qty: 18 G | Refills: 0 | Status: SHIPPED | OUTPATIENT
Start: 2019-06-10 | End: 2019-06-14 | Stop reason: HOSPADM

## 2019-06-10 RX ORDER — FLUTICASONE PROPIONATE 50 MCG
2 SPRAY, SUSPENSION (ML) NASAL DAILY
Qty: 16 G | Refills: 0 | Status: SHIPPED | OUTPATIENT
Start: 2019-06-10 | End: 2019-07-18

## 2019-06-12 ENCOUNTER — OFFICE VISIT (OUTPATIENT)
Dept: PHYSICAL THERAPY | Facility: CLINIC | Age: 44
End: 2019-06-12
Payer: COMMERCIAL

## 2019-06-12 DIAGNOSIS — M54.41 CHRONIC BILATERAL LOW BACK PAIN WITH RIGHT-SIDED SCIATICA: Primary | ICD-10-CM

## 2019-06-12 DIAGNOSIS — G89.29 CHRONIC BILATERAL LOW BACK PAIN WITH RIGHT-SIDED SCIATICA: Primary | ICD-10-CM

## 2019-06-12 PROCEDURE — 97110 THERAPEUTIC EXERCISES: CPT

## 2019-06-12 PROCEDURE — 97112 NEUROMUSCULAR REEDUCATION: CPT

## 2019-06-13 ENCOUNTER — APPOINTMENT (EMERGENCY)
Dept: RADIOLOGY | Facility: HOSPITAL | Age: 44
End: 2019-06-13
Payer: COMMERCIAL

## 2019-06-13 ENCOUNTER — HOSPITAL ENCOUNTER (OUTPATIENT)
Facility: HOSPITAL | Age: 44
Setting detail: OBSERVATION
Discharge: HOME | End: 2019-06-14
Attending: EMERGENCY MEDICINE | Admitting: INTERNAL MEDICINE
Payer: COMMERCIAL

## 2019-06-13 DIAGNOSIS — R41.82 ALTERED MENTAL STATUS: Primary | ICD-10-CM

## 2019-06-13 DIAGNOSIS — E83.51 HYPOCALCEMIA: ICD-10-CM

## 2019-06-13 DIAGNOSIS — E03.9 ACQUIRED HYPOTHYROIDISM: Chronic | ICD-10-CM

## 2019-06-13 DIAGNOSIS — Z79.899 POLYPHARMACY: ICD-10-CM

## 2019-06-13 LAB
ALBUMIN SERPL BCP-MCNC: 3.8 G/DL (ref 3.5–5)
ALP SERPL-CCNC: 87 U/L (ref 46–116)
ALT SERPL W P-5'-P-CCNC: 96 U/L (ref 12–78)
AMMONIA PLAS-SCNC: <10 UMOL/L (ref 11–35)
AMPHETAMINES SERPL QL SCN: NEGATIVE
ANION GAP SERPL CALCULATED.3IONS-SCNC: 7 MMOL/L (ref 4–13)
APAP SERPL-MCNC: <2 UG/ML (ref 10–20)
ARTERIAL PATENCY WRIST A: YES
AST SERPL W P-5'-P-CCNC: 49 U/L (ref 5–45)
BARBITURATES UR QL: NEGATIVE
BASE EXCESS BLDA CALC-SCNC: -3.3 MMOL/L
BASOPHILS # BLD AUTO: 0.04 THOUSANDS/ΜL (ref 0–0.1)
BASOPHILS NFR BLD AUTO: 0 % (ref 0–1)
BENZODIAZ UR QL: NEGATIVE
BILIRUB SERPL-MCNC: 0.2 MG/DL (ref 0.2–1)
BILIRUB UR QL STRIP: NEGATIVE
BODY TEMPERATURE: 97.7 DEGREES FEHRENHEIT
BUN SERPL-MCNC: 23 MG/DL (ref 5–25)
CALCIUM SERPL-MCNC: 7.7 MG/DL (ref 8.3–10.1)
CHLORIDE SERPL-SCNC: 104 MMOL/L (ref 100–108)
CLARITY UR: CLEAR
CO2 SERPL-SCNC: 27 MMOL/L (ref 21–32)
COCAINE UR QL: NEGATIVE
COLOR UR: NORMAL
CREAT SERPL-MCNC: 1.16 MG/DL (ref 0.6–1.3)
EOSINOPHIL # BLD AUTO: 0.02 THOUSAND/ΜL (ref 0–0.61)
EOSINOPHIL NFR BLD AUTO: 0 % (ref 0–6)
ERYTHROCYTE [DISTWIDTH] IN BLOOD BY AUTOMATED COUNT: 14.5 % (ref 11.6–15.1)
ETHANOL SERPL-MCNC: <3 MG/DL (ref 0–3)
EXT PREG TEST URINE: NEGATIVE
EXT. CONTROL ED NAV: NORMAL
GFR SERPL CREATININE-BSD FRML MDRD: 57 ML/MIN/1.73SQ M
GLUCOSE SERPL-MCNC: 116 MG/DL (ref 65–140)
GLUCOSE SERPL-MCNC: 122 MG/DL (ref 65–140)
GLUCOSE UR STRIP-MCNC: NEGATIVE MG/DL
HCO3 BLDA-SCNC: 21.8 MMOL/L (ref 22–28)
HCT VFR BLD AUTO: 37.3 % (ref 34.8–46.1)
HGB BLD-MCNC: 11.9 G/DL (ref 11.5–15.4)
HGB UR QL STRIP.AUTO: NEGATIVE
IMM GRANULOCYTES # BLD AUTO: 0.06 THOUSAND/UL (ref 0–0.2)
IMM GRANULOCYTES NFR BLD AUTO: 1 % (ref 0–2)
KETONES UR STRIP-MCNC: NEGATIVE MG/DL
LACTATE SERPL-SCNC: 1 MMOL/L (ref 0.5–2)
LEUKOCYTE ESTERASE UR QL STRIP: NEGATIVE
LYMPHOCYTES # BLD AUTO: 0.58 THOUSANDS/ΜL (ref 0.6–4.47)
LYMPHOCYTES NFR BLD AUTO: 5 % (ref 14–44)
MAGNESIUM SERPL-MCNC: 2.1 MG/DL (ref 1.6–2.6)
MCH RBC QN AUTO: 31.4 PG (ref 26.8–34.3)
MCHC RBC AUTO-ENTMCNC: 31.9 G/DL (ref 31.4–37.4)
MCV RBC AUTO: 98 FL (ref 82–98)
METHADONE UR QL: NEGATIVE
MONOCYTES # BLD AUTO: 0.29 THOUSAND/ΜL (ref 0.17–1.22)
MONOCYTES NFR BLD AUTO: 3 % (ref 4–12)
NEUTROPHILS # BLD AUTO: 10.46 THOUSANDS/ΜL (ref 1.85–7.62)
NEUTS SEG NFR BLD AUTO: 91 % (ref 43–75)
NITRITE UR QL STRIP: NEGATIVE
NON VENT ROOM AIR: 21 %
NRBC BLD AUTO-RTO: 0 /100 WBCS
O2 CT BLDA-SCNC: 15.1 ML/DL (ref 16–23)
OPIATES UR QL SCN: NEGATIVE
OXYHGB MFR BLDA: 93.3 % (ref 94–97)
PCO2 BLDA: 39.6 MM HG (ref 36–44)
PCO2 TEMP ADJ BLDA: 38.7 MM HG (ref 36–44)
PCP UR QL: NEGATIVE
PH BLD: 7.37 [PH] (ref 7.35–7.45)
PH BLDA: 7.36 [PH] (ref 7.35–7.45)
PH UR STRIP.AUTO: 6 [PH]
PLATELET # BLD AUTO: 251 THOUSANDS/UL (ref 149–390)
PMV BLD AUTO: 10.2 FL (ref 8.9–12.7)
PO2 BLD: 89.7 MM HG (ref 75–129)
PO2 BLDA: 92.5 MM HG (ref 75–129)
POTASSIUM SERPL-SCNC: 4.5 MMOL/L (ref 3.5–5.3)
PROT SERPL-MCNC: 6.8 G/DL (ref 6.4–8.2)
PROT UR STRIP-MCNC: NEGATIVE MG/DL
RBC # BLD AUTO: 3.79 MILLION/UL (ref 3.81–5.12)
SALICYLATES SERPL-MCNC: <3 MG/DL (ref 3–20)
SODIUM SERPL-SCNC: 138 MMOL/L (ref 136–145)
SP GR UR STRIP.AUTO: 1.02 (ref 1–1.03)
SPECIMEN SOURCE: ABNORMAL
THC UR QL: NEGATIVE
TROPONIN I SERPL-MCNC: <0.02 NG/ML
TSH SERPL DL<=0.05 MIU/L-ACNC: 27.55 UIU/ML (ref 0.36–3.74)
UROBILINOGEN UR QL STRIP.AUTO: 0.2 E.U./DL
WBC # BLD AUTO: 11.45 THOUSAND/UL (ref 4.31–10.16)

## 2019-06-13 PROCEDURE — 71045 X-RAY EXAM CHEST 1 VIEW: CPT

## 2019-06-13 PROCEDURE — 83605 ASSAY OF LACTIC ACID: CPT | Performed by: EMERGENCY MEDICINE

## 2019-06-13 PROCEDURE — 84480 ASSAY TRIIODOTHYRONINE (T3): CPT | Performed by: EMERGENCY MEDICINE

## 2019-06-13 PROCEDURE — 36415 COLL VENOUS BLD VENIPUNCTURE: CPT | Performed by: EMERGENCY MEDICINE

## 2019-06-13 PROCEDURE — 70450 CT HEAD/BRAIN W/O DYE: CPT

## 2019-06-13 PROCEDURE — 82805 BLOOD GASES W/O2 SATURATION: CPT | Performed by: EMERGENCY MEDICINE

## 2019-06-13 PROCEDURE — 82948 REAGENT STRIP/BLOOD GLUCOSE: CPT

## 2019-06-13 PROCEDURE — 81003 URINALYSIS AUTO W/O SCOPE: CPT | Performed by: EMERGENCY MEDICINE

## 2019-06-13 PROCEDURE — 84484 ASSAY OF TROPONIN QUANT: CPT | Performed by: EMERGENCY MEDICINE

## 2019-06-13 PROCEDURE — 84439 ASSAY OF FREE THYROXINE: CPT | Performed by: EMERGENCY MEDICINE

## 2019-06-13 PROCEDURE — 96374 THER/PROPH/DIAG INJ IV PUSH: CPT

## 2019-06-13 PROCEDURE — 81025 URINE PREGNANCY TEST: CPT | Performed by: EMERGENCY MEDICINE

## 2019-06-13 PROCEDURE — 94762 N-INVAS EAR/PLS OXIMTRY CONT: CPT

## 2019-06-13 PROCEDURE — 84443 ASSAY THYROID STIM HORMONE: CPT | Performed by: EMERGENCY MEDICINE

## 2019-06-13 PROCEDURE — 80329 ANALGESICS NON-OPIOID 1 OR 2: CPT | Performed by: EMERGENCY MEDICINE

## 2019-06-13 PROCEDURE — 82140 ASSAY OF AMMONIA: CPT | Performed by: EMERGENCY MEDICINE

## 2019-06-13 PROCEDURE — 93005 ELECTROCARDIOGRAM TRACING: CPT

## 2019-06-13 PROCEDURE — 36600 WITHDRAWAL OF ARTERIAL BLOOD: CPT

## 2019-06-13 PROCEDURE — 83735 ASSAY OF MAGNESIUM: CPT | Performed by: EMERGENCY MEDICINE

## 2019-06-13 PROCEDURE — 99285 EMERGENCY DEPT VISIT HI MDM: CPT

## 2019-06-13 PROCEDURE — 99219 PR INITIAL OBSERVATION CARE/DAY 50 MINUTES: CPT | Performed by: NURSE PRACTITIONER

## 2019-06-13 PROCEDURE — 85025 COMPLETE CBC W/AUTO DIFF WBC: CPT | Performed by: EMERGENCY MEDICINE

## 2019-06-13 PROCEDURE — 80053 COMPREHEN METABOLIC PANEL: CPT | Performed by: EMERGENCY MEDICINE

## 2019-06-13 PROCEDURE — 96361 HYDRATE IV INFUSION ADD-ON: CPT

## 2019-06-13 PROCEDURE — 80307 DRUG TEST PRSMV CHEM ANLYZR: CPT | Performed by: NURSE PRACTITIONER

## 2019-06-13 PROCEDURE — 80320 DRUG SCREEN QUANTALCOHOLS: CPT | Performed by: EMERGENCY MEDICINE

## 2019-06-13 RX ORDER — SODIUM CHLORIDE 9 MG/ML
125 INJECTION, SOLUTION INTRAVENOUS CONTINUOUS
Status: DISCONTINUED | OUTPATIENT
Start: 2019-06-13 | End: 2019-06-14 | Stop reason: HOSPADM

## 2019-06-13 RX ORDER — NALOXONE HYDROCHLORIDE 1 MG/ML
0.5 INJECTION INTRAMUSCULAR; INTRAVENOUS; SUBCUTANEOUS ONCE
Status: COMPLETED | OUTPATIENT
Start: 2019-06-13 | End: 2019-06-13

## 2019-06-13 RX ORDER — ALBUTEROL SULFATE 2.5 MG/3ML
2.5 SOLUTION RESPIRATORY (INHALATION) EVERY 6 HOURS PRN
Status: DISCONTINUED | OUTPATIENT
Start: 2019-06-13 | End: 2019-06-14 | Stop reason: HOSPADM

## 2019-06-13 RX ORDER — NALOXONE HYDROCHLORIDE 0.4 MG/ML
INJECTION, SOLUTION INTRAMUSCULAR; INTRAVENOUS; SUBCUTANEOUS
Status: DISCONTINUED
Start: 2019-06-13 | End: 2019-06-13 | Stop reason: WASHOUT

## 2019-06-13 RX ORDER — NALOXONE HYDROCHLORIDE 1 MG/ML
INJECTION INTRAMUSCULAR; INTRAVENOUS; SUBCUTANEOUS
Status: COMPLETED
Start: 2019-06-13 | End: 2019-06-13

## 2019-06-13 RX ORDER — ONDANSETRON 2 MG/ML
4 INJECTION INTRAMUSCULAR; INTRAVENOUS EVERY 6 HOURS PRN
Status: DISCONTINUED | OUTPATIENT
Start: 2019-06-13 | End: 2019-06-14 | Stop reason: HOSPADM

## 2019-06-13 RX ADMIN — SODIUM CHLORIDE 1000 ML: 0.9 INJECTION, SOLUTION INTRAVENOUS at 18:11

## 2019-06-13 RX ADMIN — NALOXONE HYDROCHLORIDE 0.5 MG: 1 INJECTION PARENTERAL at 17:49

## 2019-06-13 RX ADMIN — SODIUM CHLORIDE 125 ML/HR: 0.9 INJECTION, SOLUTION INTRAVENOUS at 22:54

## 2019-06-13 RX ADMIN — NALOXONE HYDROCHLORIDE 0.5 MG: 1 INJECTION INTRAMUSCULAR; INTRAVENOUS; SUBCUTANEOUS at 17:49

## 2019-06-14 VITALS
HEIGHT: 65 IN | RESPIRATION RATE: 16 BRPM | WEIGHT: 181.2 LBS | BODY MASS INDEX: 30.19 KG/M2 | HEART RATE: 82 BPM | SYSTOLIC BLOOD PRESSURE: 103 MMHG | TEMPERATURE: 98.5 F | DIASTOLIC BLOOD PRESSURE: 56 MMHG | OXYGEN SATURATION: 98 %

## 2019-06-14 DIAGNOSIS — G43.911 INTRACTABLE MIGRAINE WITH STATUS MIGRAINOSUS, UNSPECIFIED MIGRAINE TYPE: Primary | ICD-10-CM

## 2019-06-14 PROBLEM — E83.51 HYPOCALCEMIA: Status: ACTIVE | Noted: 2019-06-14

## 2019-06-14 LAB
ANION GAP SERPL CALCULATED.3IONS-SCNC: 8 MMOL/L (ref 4–13)
ATRIAL RATE: 84 BPM
BUN SERPL-MCNC: 17 MG/DL (ref 5–25)
CALCIUM SERPL-MCNC: 7.5 MG/DL (ref 8.3–10.1)
CHLORIDE SERPL-SCNC: 109 MMOL/L (ref 100–108)
CK SERPL-CCNC: 97 U/L (ref 26–192)
CO2 SERPL-SCNC: 24 MMOL/L (ref 21–32)
CREAT SERPL-MCNC: 0.95 MG/DL (ref 0.6–1.3)
ERYTHROCYTE [DISTWIDTH] IN BLOOD BY AUTOMATED COUNT: 14.6 % (ref 11.6–15.1)
GFR SERPL CREATININE-BSD FRML MDRD: 73 ML/MIN/1.73SQ M
GLUCOSE P FAST SERPL-MCNC: 69 MG/DL (ref 65–99)
GLUCOSE SERPL-MCNC: 69 MG/DL (ref 65–140)
HCT VFR BLD AUTO: 35 % (ref 34.8–46.1)
HGB BLD-MCNC: 10.9 G/DL (ref 11.5–15.4)
MCH RBC QN AUTO: 30.9 PG (ref 26.8–34.3)
MCHC RBC AUTO-ENTMCNC: 31.1 G/DL (ref 31.4–37.4)
MCV RBC AUTO: 99 FL (ref 82–98)
P AXIS: 77 DEGREES
PLATELET # BLD AUTO: 237 THOUSANDS/UL (ref 149–390)
PMV BLD AUTO: 10.2 FL (ref 8.9–12.7)
POTASSIUM SERPL-SCNC: 3.6 MMOL/L (ref 3.5–5.3)
PR INTERVAL: 172 MS
QRS AXIS: 23 DEGREES
QRSD INTERVAL: 94 MS
QT INTERVAL: 382 MS
QTC INTERVAL: 451 MS
RBC # BLD AUTO: 3.53 MILLION/UL (ref 3.81–5.12)
SODIUM SERPL-SCNC: 141 MMOL/L (ref 136–145)
T WAVE AXIS: 22 DEGREES
T3 SERPL-MCNC: 0.7 NG/ML (ref 0.6–1.8)
T4 FREE SERPL-MCNC: 0.89 NG/DL (ref 0.76–1.46)
VENTRICULAR RATE: 84 BPM
WBC # BLD AUTO: 6.44 THOUSAND/UL (ref 4.31–10.16)

## 2019-06-14 PROCEDURE — 93010 ELECTROCARDIOGRAM REPORT: CPT | Performed by: INTERNAL MEDICINE

## 2019-06-14 PROCEDURE — G8978 MOBILITY CURRENT STATUS: HCPCS

## 2019-06-14 PROCEDURE — G8987 SELF CARE CURRENT STATUS: HCPCS

## 2019-06-14 PROCEDURE — 82550 ASSAY OF CK (CPK): CPT | Performed by: INTERNAL MEDICINE

## 2019-06-14 PROCEDURE — 97530 THERAPEUTIC ACTIVITIES: CPT

## 2019-06-14 PROCEDURE — 97162 PT EVAL MOD COMPLEX 30 MIN: CPT

## 2019-06-14 PROCEDURE — 99244 OFF/OP CNSLTJ NEW/EST MOD 40: CPT | Performed by: PSYCHIATRY & NEUROLOGY

## 2019-06-14 PROCEDURE — G8989 SELF CARE D/C STATUS: HCPCS

## 2019-06-14 PROCEDURE — 97165 OT EVAL LOW COMPLEX 30 MIN: CPT

## 2019-06-14 PROCEDURE — 85027 COMPLETE CBC AUTOMATED: CPT | Performed by: NURSE PRACTITIONER

## 2019-06-14 PROCEDURE — G8979 MOBILITY GOAL STATUS: HCPCS

## 2019-06-14 PROCEDURE — 99217 PR OBSERVATION CARE DISCHARGE MANAGEMENT: CPT | Performed by: INTERNAL MEDICINE

## 2019-06-14 PROCEDURE — 80048 BASIC METABOLIC PNL TOTAL CA: CPT | Performed by: NURSE PRACTITIONER

## 2019-06-14 PROCEDURE — G8988 SELF CARE GOAL STATUS: HCPCS

## 2019-06-14 RX ORDER — MONTELUKAST SODIUM 10 MG/1
10 TABLET ORAL DAILY
Status: DISCONTINUED | OUTPATIENT
Start: 2019-06-14 | End: 2019-06-14 | Stop reason: HOSPADM

## 2019-06-14 RX ORDER — ALBUTEROL SULFATE 90 UG/1
2 AEROSOL, METERED RESPIRATORY (INHALATION) EVERY 6 HOURS PRN
Status: DISCONTINUED | OUTPATIENT
Start: 2019-06-14 | End: 2019-06-14 | Stop reason: HOSPADM

## 2019-06-14 RX ORDER — BUSPIRONE HYDROCHLORIDE 5 MG/1
5 TABLET ORAL 3 TIMES DAILY
Status: DISCONTINUED | OUTPATIENT
Start: 2019-06-14 | End: 2019-06-14 | Stop reason: HOSPADM

## 2019-06-14 RX ORDER — DESVENLAFAXINE 50 MG/1
50 TABLET, EXTENDED RELEASE ORAL DAILY
Status: DISCONTINUED | OUTPATIENT
Start: 2019-06-14 | End: 2019-06-14 | Stop reason: HOSPADM

## 2019-06-14 RX ORDER — FLUTICASONE PROPIONATE 50 MCG
2 SPRAY, SUSPENSION (ML) NASAL DAILY
Status: DISCONTINUED | OUTPATIENT
Start: 2019-06-14 | End: 2019-06-14 | Stop reason: HOSPADM

## 2019-06-14 RX ORDER — DICLOFENAC SODIUM 75 MG/1
75 TABLET, DELAYED RELEASE ORAL 2 TIMES DAILY PRN
Qty: 15 TABLET | Refills: 0 | Status: SHIPPED | OUTPATIENT
Start: 2019-06-14 | End: 2019-07-12

## 2019-06-14 RX ORDER — PANTOPRAZOLE SODIUM 20 MG/1
20 TABLET, DELAYED RELEASE ORAL
Status: DISCONTINUED | OUTPATIENT
Start: 2019-06-14 | End: 2019-06-14 | Stop reason: HOSPADM

## 2019-06-14 RX ORDER — CALCITRIOL 0.25 UG/1
0.5 CAPSULE, LIQUID FILLED ORAL 2 TIMES DAILY
Status: DISCONTINUED | OUTPATIENT
Start: 2019-06-14 | End: 2019-06-14 | Stop reason: HOSPADM

## 2019-06-14 RX ORDER — CALCITRIOL 0.25 UG/1
0.25 CAPSULE, LIQUID FILLED ORAL ONCE
Status: COMPLETED | OUTPATIENT
Start: 2019-06-14 | End: 2019-06-14

## 2019-06-14 RX ORDER — ASPIRIN 81 MG/1
81 TABLET ORAL DAILY
Status: DISCONTINUED | OUTPATIENT
Start: 2019-06-14 | End: 2019-06-14 | Stop reason: HOSPADM

## 2019-06-14 RX ORDER — SUCRALFATE ORAL 1 G/10ML
1000 SUSPENSION ORAL
Status: DISCONTINUED | OUTPATIENT
Start: 2019-06-14 | End: 2019-06-14 | Stop reason: HOSPADM

## 2019-06-14 RX ORDER — CHOLECALCIFEROL (VITAMIN D3) 125 MCG
1000 CAPSULE ORAL DAILY
Status: DISCONTINUED | OUTPATIENT
Start: 2019-06-14 | End: 2019-06-14 | Stop reason: HOSPADM

## 2019-06-14 RX ORDER — LEVOTHYROXINE SODIUM 0.15 MG/1
150 TABLET ORAL DAILY
Refills: 0
Start: 2019-06-14 | End: 2019-07-18 | Stop reason: DRUGHIGH

## 2019-06-14 RX ORDER — CLONAZEPAM 1 MG/1
1 TABLET ORAL
Status: DISCONTINUED | OUTPATIENT
Start: 2019-06-14 | End: 2019-06-14 | Stop reason: HOSPADM

## 2019-06-14 RX ORDER — BUPROPION HYDROCHLORIDE 150 MG/1
300 TABLET ORAL DAILY
Status: DISCONTINUED | OUTPATIENT
Start: 2019-06-14 | End: 2019-06-14 | Stop reason: HOSPADM

## 2019-06-14 RX ORDER — DIPHENHYDRAMINE HCL 25 MG
25 TABLET ORAL EVERY 6 HOURS PRN
Status: DISCONTINUED | OUTPATIENT
Start: 2019-06-14 | End: 2019-06-14 | Stop reason: HOSPADM

## 2019-06-14 RX ORDER — ONDANSETRON 4 MG/1
4 TABLET, FILM COATED ORAL EVERY 8 HOURS PRN
Qty: 15 TABLET | Refills: 0 | Status: SHIPPED | OUTPATIENT
Start: 2019-06-14 | End: 2019-07-15

## 2019-06-14 RX ORDER — CALCITRIOL 0.5 UG/1
0.5 CAPSULE, LIQUID FILLED ORAL 2 TIMES DAILY
Refills: 0
Start: 2019-06-14

## 2019-06-14 RX ADMIN — CALCITRIOL 0.25 MCG: 0.25 CAPSULE, LIQUID FILLED ORAL at 14:57

## 2019-06-14 RX ADMIN — DIPHENHYDRAMINE HCL 25 MG: 25 TABLET ORAL at 10:26

## 2019-06-14 RX ADMIN — MONTELUKAST SODIUM 10 MG: 10 TABLET, FILM COATED ORAL at 10:32

## 2019-06-14 RX ADMIN — BUPROPION HYDROCHLORIDE 300 MG: 150 TABLET, FILM COATED, EXTENDED RELEASE ORAL at 13:04

## 2019-06-14 RX ADMIN — DESVENLAFAXINE 50 MG: 50 TABLET, EXTENDED RELEASE ORAL at 13:03

## 2019-06-14 RX ADMIN — CYANOCOBALAMIN TAB 500 MCG 1000 MCG: 500 TAB at 10:33

## 2019-06-14 RX ADMIN — MAGNESIUM OXIDE TAB 400 MG (241.3 MG ELEMENTAL MG) 400 MG: 400 (241.3 MG) TAB at 10:32

## 2019-06-14 RX ADMIN — SODIUM CHLORIDE 125 ML/HR: 0.9 INJECTION, SOLUTION INTRAVENOUS at 14:23

## 2019-06-14 RX ADMIN — CALCITRIOL 0.5 MCG: 0.25 CAPSULE, LIQUID FILLED ORAL at 11:07

## 2019-06-14 RX ADMIN — ASPIRIN 81 MG: 81 TABLET, COATED ORAL at 10:32

## 2019-06-14 RX ADMIN — SUCRALFATE 1000 MG: 1 SUSPENSION ORAL at 10:33

## 2019-06-14 RX ADMIN — SODIUM CHLORIDE 125 ML/HR: 0.9 INJECTION, SOLUTION INTRAVENOUS at 07:13

## 2019-06-14 RX ADMIN — PANTOPRAZOLE SODIUM 20 MG: 20 TABLET, DELAYED RELEASE ORAL at 10:32

## 2019-06-17 ENCOUNTER — TELEPHONE (OUTPATIENT)
Dept: FAMILY MEDICINE CLINIC | Facility: CLINIC | Age: 44
End: 2019-06-17

## 2019-06-17 ENCOUNTER — OFFICE VISIT (OUTPATIENT)
Dept: NEUROLOGY | Facility: CLINIC | Age: 44
End: 2019-06-17

## 2019-06-17 ENCOUNTER — OFFICE VISIT (OUTPATIENT)
Dept: PHYSICAL THERAPY | Facility: CLINIC | Age: 44
End: 2019-06-17
Payer: COMMERCIAL

## 2019-06-17 ENCOUNTER — TRANSITIONAL CARE MANAGEMENT (OUTPATIENT)
Dept: FAMILY MEDICINE CLINIC | Facility: CLINIC | Age: 44
End: 2019-06-17

## 2019-06-17 VITALS
HEIGHT: 65 IN | SYSTOLIC BLOOD PRESSURE: 94 MMHG | BODY MASS INDEX: 29.66 KG/M2 | HEART RATE: 100 BPM | WEIGHT: 178 LBS | DIASTOLIC BLOOD PRESSURE: 58 MMHG

## 2019-06-17 DIAGNOSIS — R41.82 ALTERED MENTAL STATUS, UNSPECIFIED ALTERED MENTAL STATUS TYPE: ICD-10-CM

## 2019-06-17 DIAGNOSIS — G47.10 HYPERSOMNIA: ICD-10-CM

## 2019-06-17 DIAGNOSIS — Z87.898 HISTORY OF INSOMNIA: ICD-10-CM

## 2019-06-17 DIAGNOSIS — Z86.69 HISTORY OF SLEEP APNEA: ICD-10-CM

## 2019-06-17 DIAGNOSIS — M54.41 CHRONIC BILATERAL LOW BACK PAIN WITH RIGHT-SIDED SCIATICA: Primary | ICD-10-CM

## 2019-06-17 DIAGNOSIS — G43.101 MIGRAINE WITH AURA AND WITH STATUS MIGRAINOSUS, NOT INTRACTABLE: Primary | ICD-10-CM

## 2019-06-17 DIAGNOSIS — G43.109 MIGRAINE WITH AURA AND WITHOUT STATUS MIGRAINOSUS, NOT INTRACTABLE: ICD-10-CM

## 2019-06-17 DIAGNOSIS — R40.4 SPELL OF ALTERED CONSCIOUSNESS: ICD-10-CM

## 2019-06-17 DIAGNOSIS — G89.29 CHRONIC BILATERAL LOW BACK PAIN WITH RIGHT-SIDED SCIATICA: Primary | ICD-10-CM

## 2019-06-17 PROCEDURE — 99215 OFFICE O/P EST HI 40 MIN: CPT | Performed by: PSYCHIATRY & NEUROLOGY

## 2019-06-17 PROCEDURE — 97112 NEUROMUSCULAR REEDUCATION: CPT

## 2019-06-17 PROCEDURE — 97110 THERAPEUTIC EXERCISES: CPT

## 2019-06-17 RX ORDER — LANOLIN ALCOHOL/MO/W.PET/CERES
CREAM (GRAM) TOPICAL
COMMUNITY
Start: 2017-05-22 | End: 2019-06-17 | Stop reason: SDUPTHER

## 2019-06-17 RX ORDER — ELETRIPTAN HYDROBROMIDE 40 MG/1
TABLET, FILM COATED ORAL
COMMUNITY
Start: 2017-05-11 | End: 2019-07-12

## 2019-06-17 RX ORDER — TOPIRAMATE 50 MG/1
50 TABLET, FILM COATED ORAL 2 TIMES DAILY
Qty: 60 TABLET | Refills: 4 | Status: SHIPPED | OUTPATIENT
Start: 2019-06-17 | End: 2019-06-24 | Stop reason: SDUPTHER

## 2019-06-19 ENCOUNTER — HOSPITAL ENCOUNTER (EMERGENCY)
Facility: HOSPITAL | Age: 44
Discharge: HOME | End: 2019-06-19
Attending: EMERGENCY MEDICINE | Admitting: EMERGENCY MEDICINE
Payer: COMMERCIAL

## 2019-06-19 ENCOUNTER — APPOINTMENT (EMERGENCY)
Dept: RADIOLOGY | Facility: HOSPITAL | Age: 44
End: 2019-06-19
Payer: COMMERCIAL

## 2019-06-19 VITALS
BODY MASS INDEX: 29.61 KG/M2 | OXYGEN SATURATION: 96 % | SYSTOLIC BLOOD PRESSURE: 110 MMHG | TEMPERATURE: 97.6 F | WEIGHT: 177.91 LBS | RESPIRATION RATE: 16 BRPM | HEART RATE: 64 BPM | DIASTOLIC BLOOD PRESSURE: 65 MMHG

## 2019-06-19 DIAGNOSIS — G43.909 MIGRAINE HEADACHE: Primary | ICD-10-CM

## 2019-06-19 LAB
ALBUMIN SERPL BCP-MCNC: 3.1 G/DL (ref 3.5–5)
ALP SERPL-CCNC: 81 U/L (ref 46–116)
ALT SERPL W P-5'-P-CCNC: 58 U/L (ref 12–78)
AMPHETAMINES SERPL QL SCN: NEGATIVE
ANION GAP SERPL CALCULATED.3IONS-SCNC: 7 MMOL/L (ref 4–13)
AST SERPL W P-5'-P-CCNC: 34 U/L (ref 5–45)
B-HCG SERPL-ACNC: <2 MIU/ML
BARBITURATES UR QL: NEGATIVE
BASOPHILS # BLD AUTO: 0.04 THOUSANDS/ΜL (ref 0–0.1)
BASOPHILS NFR BLD AUTO: 1 % (ref 0–1)
BENZODIAZ UR QL: NEGATIVE
BILIRUB SERPL-MCNC: 0.2 MG/DL (ref 0.2–1)
BILIRUB UR QL STRIP: NEGATIVE
BUN SERPL-MCNC: 13 MG/DL (ref 5–25)
CALCIUM SERPL-MCNC: 8.1 MG/DL (ref 8.3–10.1)
CHLORIDE SERPL-SCNC: 106 MMOL/L (ref 100–108)
CLARITY UR: CLEAR
CO2 SERPL-SCNC: 22 MMOL/L (ref 21–32)
COCAINE UR QL: NEGATIVE
COLOR UR: NORMAL
CREAT SERPL-MCNC: 0.84 MG/DL (ref 0.6–1.3)
EOSINOPHIL # BLD AUTO: 0.19 THOUSAND/ΜL (ref 0–0.61)
EOSINOPHIL NFR BLD AUTO: 3 % (ref 0–6)
ERYTHROCYTE [DISTWIDTH] IN BLOOD BY AUTOMATED COUNT: 14.5 % (ref 11.6–15.1)
EXT PREG TEST URINE: NEGATIVE
EXT. CONTROL ED NAV: ABNORMAL
GFR SERPL CREATININE-BSD FRML MDRD: 85 ML/MIN/1.73SQ M
GLUCOSE SERPL-MCNC: 73 MG/DL (ref 65–140)
GLUCOSE SERPL-MCNC: 76 MG/DL (ref 65–140)
GLUCOSE UR STRIP-MCNC: NEGATIVE MG/DL
HCT VFR BLD AUTO: 34.6 % (ref 34.8–46.1)
HGB BLD-MCNC: 11 G/DL (ref 11.5–15.4)
HGB UR QL STRIP.AUTO: NEGATIVE
IMM GRANULOCYTES # BLD AUTO: 0.03 THOUSAND/UL (ref 0–0.2)
IMM GRANULOCYTES NFR BLD AUTO: 0 % (ref 0–2)
KETONES UR STRIP-MCNC: NEGATIVE MG/DL
LEUKOCYTE ESTERASE UR QL STRIP: NEGATIVE
LYMPHOCYTES # BLD AUTO: 2.11 THOUSANDS/ΜL (ref 0.6–4.47)
LYMPHOCYTES NFR BLD AUTO: 31 % (ref 14–44)
MAGNESIUM SERPL-MCNC: 2 MG/DL (ref 1.6–2.6)
MCH RBC QN AUTO: 31 PG (ref 26.8–34.3)
MCHC RBC AUTO-ENTMCNC: 31.8 G/DL (ref 31.4–37.4)
MCV RBC AUTO: 98 FL (ref 82–98)
METHADONE UR QL: NEGATIVE
MONOCYTES # BLD AUTO: 0.37 THOUSAND/ΜL (ref 0.17–1.22)
MONOCYTES NFR BLD AUTO: 5 % (ref 4–12)
NEUTROPHILS # BLD AUTO: 4.05 THOUSANDS/ΜL (ref 1.85–7.62)
NEUTS SEG NFR BLD AUTO: 60 % (ref 43–75)
NITRITE UR QL STRIP: NEGATIVE
NRBC BLD AUTO-RTO: 0 /100 WBCS
OPIATES UR QL SCN: NEGATIVE
PCP UR QL: NEGATIVE
PH UR STRIP.AUTO: 7 [PH]
PLATELET # BLD AUTO: 245 THOUSANDS/UL (ref 149–390)
PMV BLD AUTO: 9.7 FL (ref 8.9–12.7)
POTASSIUM SERPL-SCNC: 5.1 MMOL/L (ref 3.5–5.3)
PROT SERPL-MCNC: 6.8 G/DL (ref 6.4–8.2)
PROT UR STRIP-MCNC: NEGATIVE MG/DL
RBC # BLD AUTO: 3.55 MILLION/UL (ref 3.81–5.12)
SODIUM SERPL-SCNC: 135 MMOL/L (ref 136–145)
SP GR UR STRIP.AUTO: 1.01 (ref 1–1.03)
THC UR QL: NEGATIVE
TSH SERPL DL<=0.05 MIU/L-ACNC: 2.83 UIU/ML (ref 0.36–3.74)
UROBILINOGEN UR QL STRIP.AUTO: 0.2 E.U./DL
WBC # BLD AUTO: 6.79 THOUSAND/UL (ref 4.31–10.16)

## 2019-06-19 PROCEDURE — 99285 EMERGENCY DEPT VISIT HI MDM: CPT

## 2019-06-19 PROCEDURE — 36415 COLL VENOUS BLD VENIPUNCTURE: CPT | Performed by: EMERGENCY MEDICINE

## 2019-06-19 PROCEDURE — 84702 CHORIONIC GONADOTROPIN TEST: CPT | Performed by: EMERGENCY MEDICINE

## 2019-06-19 PROCEDURE — 80053 COMPREHEN METABOLIC PANEL: CPT | Performed by: EMERGENCY MEDICINE

## 2019-06-19 PROCEDURE — 96375 TX/PRO/DX INJ NEW DRUG ADDON: CPT

## 2019-06-19 PROCEDURE — 81025 URINE PREGNANCY TEST: CPT | Performed by: EMERGENCY MEDICINE

## 2019-06-19 PROCEDURE — 71046 X-RAY EXAM CHEST 2 VIEWS: CPT

## 2019-06-19 PROCEDURE — 82948 REAGENT STRIP/BLOOD GLUCOSE: CPT

## 2019-06-19 PROCEDURE — 83735 ASSAY OF MAGNESIUM: CPT | Performed by: EMERGENCY MEDICINE

## 2019-06-19 PROCEDURE — 96374 THER/PROPH/DIAG INJ IV PUSH: CPT

## 2019-06-19 PROCEDURE — 85025 COMPLETE CBC W/AUTO DIFF WBC: CPT | Performed by: EMERGENCY MEDICINE

## 2019-06-19 PROCEDURE — 93005 ELECTROCARDIOGRAM TRACING: CPT

## 2019-06-19 PROCEDURE — 96361 HYDRATE IV INFUSION ADD-ON: CPT

## 2019-06-19 PROCEDURE — 70450 CT HEAD/BRAIN W/O DYE: CPT

## 2019-06-19 PROCEDURE — 80307 DRUG TEST PRSMV CHEM ANLYZR: CPT | Performed by: EMERGENCY MEDICINE

## 2019-06-19 PROCEDURE — 84443 ASSAY THYROID STIM HORMONE: CPT | Performed by: EMERGENCY MEDICINE

## 2019-06-19 PROCEDURE — 81003 URINALYSIS AUTO W/O SCOPE: CPT | Performed by: EMERGENCY MEDICINE

## 2019-06-19 RX ORDER — KETOROLAC TROMETHAMINE 30 MG/ML
15 INJECTION, SOLUTION INTRAMUSCULAR; INTRAVENOUS ONCE
Status: COMPLETED | OUTPATIENT
Start: 2019-06-19 | End: 2019-06-19

## 2019-06-19 RX ORDER — ONDANSETRON 4 MG/1
4 TABLET, FILM COATED ORAL EVERY 6 HOURS
Qty: 9 TABLET | Refills: 0 | Status: SHIPPED | OUTPATIENT
Start: 2019-06-19 | End: 2019-07-15

## 2019-06-19 RX ORDER — KETOROLAC TROMETHAMINE 10 MG/1
10 TABLET, FILM COATED ORAL 3 TIMES DAILY PRN
Qty: 9 TABLET | Refills: 0 | Status: SHIPPED | OUTPATIENT
Start: 2019-06-19 | End: 2019-07-12

## 2019-06-19 RX ORDER — ONDANSETRON 2 MG/ML
4 INJECTION INTRAMUSCULAR; INTRAVENOUS ONCE
Status: COMPLETED | OUTPATIENT
Start: 2019-06-19 | End: 2019-06-19

## 2019-06-19 RX ADMIN — ONDANSETRON 4 MG: 2 INJECTION INTRAMUSCULAR; INTRAVENOUS at 19:24

## 2019-06-19 RX ADMIN — SODIUM CHLORIDE 1000 ML: 0.9 INJECTION, SOLUTION INTRAVENOUS at 15:59

## 2019-06-19 RX ADMIN — SODIUM CHLORIDE 1000 ML: 0.9 INJECTION, SOLUTION INTRAVENOUS at 19:00

## 2019-06-19 RX ADMIN — KETOROLAC TROMETHAMINE 15 MG: 30 INJECTION, SOLUTION INTRAMUSCULAR at 19:24

## 2019-06-20 ENCOUNTER — TELEPHONE (OUTPATIENT)
Dept: NEUROLOGY | Facility: CLINIC | Age: 44
End: 2019-06-20

## 2019-06-20 LAB
ATRIAL RATE: 69 BPM
P AXIS: 61 DEGREES
PR INTERVAL: 154 MS
QRS AXIS: 20 DEGREES
QRSD INTERVAL: 88 MS
QT INTERVAL: 396 MS
QTC INTERVAL: 424 MS
T WAVE AXIS: 26 DEGREES
VENTRICULAR RATE: 69 BPM

## 2019-06-20 PROCEDURE — 93010 ELECTROCARDIOGRAM REPORT: CPT | Performed by: INTERNAL MEDICINE

## 2019-06-24 ENCOUNTER — APPOINTMENT (OUTPATIENT)
Dept: PHYSICAL THERAPY | Facility: CLINIC | Age: 44
End: 2019-06-24
Payer: COMMERCIAL

## 2019-06-24 DIAGNOSIS — Z78.9 DEEP VEIN THROMBOSIS (DVT) PROPHYLAXIS PRESCRIBED AT DISCHARGE: ICD-10-CM

## 2019-06-24 DIAGNOSIS — G43.109 MIGRAINE WITH AURA AND WITHOUT STATUS MIGRAINOSUS, NOT INTRACTABLE: ICD-10-CM

## 2019-06-24 RX ORDER — ASPIRIN 81 MG/1
TABLET, DELAYED RELEASE ORAL
Qty: 30 TABLET | Refills: 3 | Status: SHIPPED | OUTPATIENT
Start: 2019-06-24 | End: 2019-07-18 | Stop reason: ALTCHOICE

## 2019-06-24 RX ORDER — TOPIRAMATE 50 MG/1
TABLET, FILM COATED ORAL
Qty: 30 TABLET | Refills: 5 | Status: SHIPPED | OUTPATIENT
Start: 2019-06-24 | End: 2019-07-12

## 2019-06-26 ENCOUNTER — APPOINTMENT (OUTPATIENT)
Dept: PHYSICAL THERAPY | Facility: CLINIC | Age: 44
End: 2019-06-26
Payer: COMMERCIAL

## 2019-07-03 ENCOUNTER — TRANSITIONAL CARE MANAGEMENT (OUTPATIENT)
Dept: FAMILY MEDICINE CLINIC | Facility: CLINIC | Age: 44
End: 2019-07-03

## 2019-07-08 ENCOUNTER — TELEPHONE (OUTPATIENT)
Dept: FAMILY MEDICINE CLINIC | Facility: CLINIC | Age: 44
End: 2019-07-08

## 2019-07-11 ENCOUNTER — TELEPHONE (OUTPATIENT)
Dept: FAMILY MEDICINE CLINIC | Facility: CLINIC | Age: 44
End: 2019-07-11

## 2019-07-11 NOTE — TELEPHONE ENCOUNTER
Please call [facility]  I need records from recent prolonged hospitalization  She is scheduled tomorrow for TCM  She is very complex and a poor historian  I can not do a TCM without records  If we can not get records, we may have to reschedule to Monday   TY

## 2019-07-12 ENCOUNTER — OFFICE VISIT (OUTPATIENT)
Dept: FAMILY MEDICINE CLINIC | Facility: CLINIC | Age: 44
End: 2019-07-12
Payer: COMMERCIAL

## 2019-07-12 VITALS
HEART RATE: 68 BPM | RESPIRATION RATE: 18 BRPM | TEMPERATURE: 98.5 F | BODY MASS INDEX: 29.49 KG/M2 | WEIGHT: 177 LBS | SYSTOLIC BLOOD PRESSURE: 102 MMHG | DIASTOLIC BLOOD PRESSURE: 70 MMHG | HEIGHT: 65 IN

## 2019-07-12 DIAGNOSIS — I45.81 PROLONGED QT INTERVAL SYNDROME: ICD-10-CM

## 2019-07-12 DIAGNOSIS — Z09 HOSPITAL DISCHARGE FOLLOW-UP: Primary | ICD-10-CM

## 2019-07-12 DIAGNOSIS — M79.7 FIBROMYALGIA: ICD-10-CM

## 2019-07-12 DIAGNOSIS — Z98.890 S/P CARDIAC CATHETERIZATION: ICD-10-CM

## 2019-07-12 DIAGNOSIS — F41.8 DEPRESSION WITH ANXIETY: Chronic | ICD-10-CM
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
## 2019-07-12 DIAGNOSIS — I82.401 ACUTE DEEP VEIN THROMBOSIS (DVT) OF RIGHT LOWER EXTREMITY, UNSPECIFIED VEIN (HCC): ICD-10-CM

## 2019-07-12 DIAGNOSIS — G92.8 TOXIC METABOLIC ENCEPHALOPATHY: ICD-10-CM

## 2019-07-12 DIAGNOSIS — J18.9 HAP (HOSPITAL-ACQUIRED PNEUMONIA): ICD-10-CM

## 2019-07-12 DIAGNOSIS — I46.9 CARDIAC ARREST (HCC): ICD-10-CM

## 2019-07-12 DIAGNOSIS — Z79.899 POLYPHARMACY: ICD-10-CM

## 2019-07-12 PROCEDURE — 99495 TRANSJ CARE MGMT MOD F2F 14D: CPT | Performed by: NURSE PRACTITIONER

## 2019-07-12 RX ORDER — POTASSIUM CHLORIDE 20 MEQ/1
20 TABLET, EXTENDED RELEASE ORAL DAILY
COMMUNITY
End: 2019-07-29 | Stop reason: SDUPTHER

## 2019-07-12 RX ORDER — DIVALPROEX SODIUM 250 MG/1
250 TABLET, DELAYED RELEASE ORAL 2 TIMES DAILY
COMMUNITY
End: 2019-07-31 | Stop reason: SDUPTHER

## 2019-07-12 NOTE — PROGRESS NOTES
TRANSITION OF CARE OFFICE VISIT  [facility] - [facility]    NAME: Jamey De Dios  AGE: 40 y o  SEX: female  : 1975     DATE: 2019     Assessment and Plan:     Problem List Items Addressed This Visit        Nervous and Auditory    Toxic metabolic encephalopathy    Relevant Orders    Heavy metals, blood       Other    Depression with anxiety (Chronic)    Fibromyalgia      Other Visit Diagnoses     Hospital discharge follow-up    -  Primary    Cardiac arrest [facility])        Relevant Orders    Ambulatory referral to Cardiology    Prolonged QT interval syndrome        Relevant Orders    Ambulatory referral to Cardiology    Heavy metals, blood    HAP (hospital-acquired pneumonia)        Acute deep vein thrombosis (DVT) of right lower extremity, unspecified vein (HCC)        S/P cardiac catheterization        Relevant Orders    Ambulatory referral to Cardiology    Polypharmacy               Counseling:     · Medication Side Effects: Adverse side effects of medications were reviewed with the patient/guardian today  · I have spent 30 minutes with patient and her  today in which greater than 50% of this time was spent in counseling/coordination of care regarding Diagnostic results, Prognosis, Risks and benefits of tx options, Intructions for management, Patient and family education, Importance of tx compliance, Risk factor reductions and Impressions  · Barriers to treatment include: Non-adherence to prescribed treatment plan, Patient/caregiver's lack of understanding of their condition and prescribed treatment plan  · Will monitor patient closely (q weekly ov)  · Patient will go to [facility] and personally request a copy of records for continuity of care  I will review closely when receive   Will discuss at next ov if we need to do additional testing, f/u       Transitional Care Management Review:     Jamey De Dios is a 40 y o  female here for TCM follow-up    During the TCM phone call patient stated:    TCM Call (since 6/11/2019)     Date and time call was made  7/3/2019  4:06 PM    Patient was hospitialized at  [facility]; Other (comment)    Comment  [facility]    Date of Admission  06/20/19    Date of discharge  07/03/19    Diagnosis  Heart issue    Disposition  Home    Current Symptoms  Cough; Shortness of breath    Cough Severity  Mild    Shortness of breath severity  Mild      TCM Call (since 6/11/2019)     Post hospital issues  None    Should patient be enrolled in anticoag monitoring? Yes    Scheduled for follow up? Yes    Did you obtain your prescribed medications  Yes    Do you need help managing your prescriptions or medications  No    Is transportation to your appointment needed  No    I have advised the patient to call PCP with any new or worsening symptoms  B  [place]en[place] LPN [birth year]    Living Arrangements  Family members    [facility] staff    The type of support provided  Physical    Do you have social support  Yes, as much as I need    Are you recieving any outpatient services  Yes    Are you recieving home care services  Yes    Types of home care services  Home health aid    Are you using any community resources  No    Current waiver services  No    Have you fallen in the last 12 months  No    Interperter language line needed  No    Counseling  Patient           HPI:     41 yo female with complex medical history inclusive of  thyroid cancer post thyroidectomy, gastric bypass, depression/anxiety, fibromyalgia, migraines, several recent episodes of mental status change requiring hospitalization, and polypharmacy  presents for hospital discharge follow up [facility] 6/20-7/3/19)  Information provided by patient and her  as we have not received medical records despite requesting them multiple times  Therefore information may be missing or inaccurate      She was evaluated at [facility] ER on 6/19 for mental status change, per , she was in a "deep sleep"  She was evaluated and d/c'd with dx of migraine  6/20/19, she again was found by her  in "deep sleep"  He proceeded to call 911  She was diverted to [facility] as "[facility] did not have room in the ER" and she was unstable  Per  she suffered cardiac arrest en route  She had an emergent cardiac cath at [facility] which showed no CAD  She was however diagnosed with Prolonged QT syndrome felt to be secondary to polypharmacy (several psych meds) Apparently hospitalist team worked with house pharmacist to modify med list to removal possible medication culprits  She was diagnosed with blood clot in right leg and placed on eliquis  She was dx'd with hospital acquired PNA and treated  Since being home, a home health nurse is working with her  She is receiving home PT  She has seen her psychiatrist, Dr Lia Alejandra, at [facility] on this past wed  He agreed with med changes  She has also seen her endocrinologist, Dr Ja Estevez who made no changes/    Pt's main c/o is moderate fibromyalgia pain 2/2 having her meds cut down  She is also tired, having abdominal pain s/p ppi d/c'd, experiencing postural dizziness  She denies chest pain, dyspnea, syncope, suicidal ideation, "deep sleeps" since home  The following portions of the patient's history were reviewed and updated as appropriate: allergies, current medications, past family history, past medical history, past social history, past surgical history and problem list      Review of Systems:     Review of Systems   Constitutional: Positive for activity change and fatigue  Negative for fever and unexpected weight change  HENT: Negative for trouble swallowing  Eyes: Negative for visual disturbance  Respiratory: Negative  Cardiovascular: Negative  Gastrointestinal: Positive for abdominal pain, constipation and nausea  Negative for blood in stool and vomiting          Gerd meds discontinued by attending hospitalist   Endocrine: Negative for cold intolerance, heat intolerance, polydipsia, polyphagia and polyuria  Having episodes of hypoglycemia  Checks sugars prn with sxs     Genitourinary: Negative for difficulty urinating, dysuria and hematuria  Musculoskeletal: Positive for arthralgias, back pain, myalgias and neck pain  Generalized pain worse since fibromyalgia meds changed 2/2 prolonged QT synd s/p cardiac arrest   Skin: Negative for wound  Neurological: Positive for dizziness, weakness and headaches  Negative for tremors, seizures, syncope, facial asymmetry, speech difficulty and numbness  Hematological: Negative  Psychiatric/Behavioral: Positive for dysphoric mood and sleep disturbance  Negative for agitation, confusion, self-injury and suicidal ideas  The patient is nervous/anxious  Problem List:     Patient Active Problem List   Diagnosis    Abdominal pain, epigastric    Acquired hypothyroidism    Allergic rhinitis    Analgesic rebound headache    Asthma, allergic, mild intermittent, uncomplicated    Neck pain    Chronic fatigue    Chronic low back pain    Depression with anxiety    Fibromyalgia    GERD without esophagitis    Hyperlipidemia    Iron deficiency anemia    Irritable bowel syndrome with diarrhea    Migraine, chronic, without aura    Flat foot    Reactive airway disease    Neoplasm of thymus    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[address] )    Toxic metabolic encephalopathy    Accidental drug overdose    Altered mental status    Rhabdomyolysis    Right foot pain    Depression    Hypocalcemia        Objective:     /70 (BP Location: Left arm, Patient Position: Sitting, Cuff Size: Standard)   Pulse 68   Temp 98 5 °F (36 9 °C)   Resp 18   Ht 5' 5" (1 651 m)   Wt 80 3 kg (177 lb)   LMP 09/06/2017   BMI 29 45 kg/m²     Physical Exam   Constitutional: She is oriented to person, place, and time   Vital signs are normal  She appears well-developed and well-nourished  No distress  HENT:   Head: Normocephalic and atraumatic  Mouth/Throat: Oropharynx is clear and moist    Eyes: Pupils are equal, round, and reactive to light  EOM are normal    Neck: Normal range of motion  Neck supple  Cardiovascular: Normal rate, regular rhythm, normal heart sounds and intact distal pulses  Pulmonary/Chest: Effort normal and breath sounds normal    Abdominal: Soft  Bowel sounds are normal  There is no tenderness  Musculoskeletal: She exhibits no edema  Neurological: She is alert and oriented to person, place, and time  Coordination normal    Skin: Skin is warm and dry  No pallor  Psychiatric: Her affect is blunt  She is slowed  She exhibits a depressed mood  She expresses no suicidal ideation  Nursing note and vitals reviewed  Laboratory Results: records from [facility] admit not available    Radiology/Other Diagnostic Testing Results: records from [facility] not available    Xr Chest 2 Views    Result Date: 6/20/2019  CHEST INDICATION:   hypotension  Altered mental status COMPARISON:  06/13/2019 EXAM PERFORMED/VIEWS:  XR CHEST PA & LATERAL  The frontal view was performed utilizing dual energy radiographic technique  Images: 4 FINDINGS: Cardiomediastinal silhouette appears unremarkable  The lungs are clear  Calcified granuloma in the right middle lobe  No pneumothorax or pleural effusion  Osseous structures appear within normal limits for patient age  No acute cardiopulmonary disease  Workstation performed: [id number]     Ct Head Without Contrast    Result Date: 6/19/2019  CT BRAIN - WITHOUT CONTRAST INDICATION:   hypotension  55-year-old female with altered mental status  COMPARISON:  CT head from June 13, 2019 TECHNIQUE:  CT examination of the brain was performed  In addition to axial images, coronal 2D reformatted images were created and submitted for interpretation  Radiation dose length product (DLP) for this visit:  928 mGy-cm     This examination, like all CT scans performed in the [facility], was performed utilizing techniques to minimize radiation dose exposure, including the use of iterative reconstruction and automated exposure control  IMAGE QUALITY:  Diagnostic  FINDINGS: PARENCHYMA:  No intracranial mass, mass effect or midline shift  No CT signs of acute infarction  No acute parenchymal hemorrhage  VENTRICLES AND EXTRA-AXIAL SPACES:  Normal for the patient's age  No extra-axial blood  VISUALIZED ORBITS AND PARANASAL SINUSES:  Unremarkable  CALVARIUM AND EXTRACRANIAL SOFT TISSUES:  Normal      Normal unenhanced CT of the head   Workstation performed: [id number]        Current Medications:     Outpatient Medications Prior to Visit   Medication Sig Dispense Refill    apixaban (ELIQUIS) 5 mg Take 5 mg by mouth 2 (two) times a day      divalproex sodium (DEPAKOTE) 250 mg EC tablet Take 250 mg by mouth 2 (two) times a day      potassium chloride (K-DUR,KLOR-CON) 20 mEq tablet Take 20 mEq by mouth daily      albuterol (2 5 mg/3 mL) 0 083 % nebulizer solution Take 2 5 mg by nebulization every 6 (six) hours as needed for wheezing      buPROPion (WELLBUTRIN XL) 300 mg 24 hr tablet Take 300 mg by mouth every morning      calcitriol (ROCALTROL) 0 5 MCG capsule Take 1 capsule (0 5 mcg total) by mouth 2 (two) times a day  0    Calcium Carb-Cholecalciferol (CALCIUM 600 + D PO) 3 tablets 4 times Daily       chlorhexidine (PERIDEX) 0 12 % solution       Cholecalciferol (VITAMIN D PO) Take 50,000 Units by mouth every 5 (five) days       clonazePAM (KlonoPIN) 1 mg tablet Take 1 mg by mouth daily at bedtime       Cyanocobalamin (VITAMIN B 12 PO) Take 1,000 mcg by mouth daily      fluticasone (FLONASE) 50 mcg/act nasal spray 2 sprays into each nostril daily 16 g 0    folic acid (FOLVITE) 1 mg tablet Take 1 tablet (1,000 mcg total) by mouth daily 30 tablet 6    gabapentin (NEURONTIN) 300 mg capsule TAKE ONE CAPSULE BY MOUTH TWICE A DAY AND TAKE TWO CAPSULES BY MOUTH IN THE EVENING (Patient taking differently: one tablet 3x daily) 120 capsule 6    levothyroxine 150 mcg tablet Take 1 tablet (150 mcg total) by mouth daily (Patient taking differently: Take 175 mcg by mouth daily )  0    magnesium oxide (MAG-OX) 400 mg Take 400 mg by mouth 3 (three) times a day      montelukast (SINGULAIR) 10 mg tablet TAKE ONE TABLET BY MOUTH EVERY DAY AS DIRECTED 30 tablet 1    Multiple Vitamin (MULTIVITAMIN) tablet Take 1 tablet by mouth daily      ondansetron (ZOFRAN) 4 mg tablet Take 1 tablet (4 mg total) by mouth every 8 (eight) hours as needed for nausea or vomiting (migraine) 15 tablet 0    ondansetron (ZOFRAN) 4 mg tablet Take 1 tablet (4 mg total) by mouth every 6 (six) hours for 3 days 9 tablet 0    ONETOUCH VERIO test strip       SM ASPIRIN ADULT LOW STRENGTH 81 MG EC tablet TAKE ONE TABLET BY MOUTH EVERY DAY AS DIRECTED 30 tablet 3    sucralfate (CARAFATE) 1 g/10 mL suspension Take 10 mL (1 g total) by mouth 4 (four) times a day (with meals and at bedtime) (Patient taking differently: Take 1 g by mouth as needed ) 1200 mL 1    traMADol-acetaminophen (ULTRACET) 37 5-325 mg per tablet Take 1 tablet by mouth every 12 (twelve) hours as needed for moderate pain 30 tablet 0    Zolpidem Tartrate (AMBIEN PO) Take 10 mg by mouth daily at bedtime      busPIRone (BUSPAR) 10 mg tablet Take 10 mg by mouth 3 (three) times a day      desvenlafaxine succinate (PRISTIQ) 50 mg 24 hr tablet Take 50 mg by mouth daily      diclofenac (VOLTAREN) 75 mg EC tablet Take 1 tablet (75 mg total) by mouth 2 (two) times a day as needed (migraine) (Patient not taking: Reported on 6/17/2019) 15 tablet 0    eletriptan (RELPAX) 40 MG tablet TAKE 1 TABLET BY MOUTH AT ONSET OF SEVERE HEADACHE, MAY REPEATIN      (REFER TO PRESCRIPTION NOTES)        ketorolac (TORADOL) 10 mg tablet Take 1 tablet (10 mg total) by mouth 3 (three) times a day as needed for moderate pain for up to 3 days 9 tablet 0    omeprazole (PriLOSEC) 40 MG capsule TAKE ONE CAPSULE BY MOUTH EVERY DAY 30 capsule 6    topiramate (TOPAMAX) 50 MG tablet TAKE ONE TABLET BY MOUTH EVERY DAY AT BEDTIME 30 tablet 5     No facility-administered medications prior to visit          Timothy Hays, [address] [facility]

## 2019-07-13 ENCOUNTER — OFFICE VISIT (OUTPATIENT)
Dept: FAMILY MEDICINE CLINIC | Facility: CLINIC | Age: 44
End: 2019-07-13
Payer: COMMERCIAL

## 2019-07-13 VITALS
HEIGHT: 65 IN | RESPIRATION RATE: 16 BRPM | HEART RATE: 60 BPM | OXYGEN SATURATION: 97 % | BODY MASS INDEX: 29.49 KG/M2 | SYSTOLIC BLOOD PRESSURE: 100 MMHG | TEMPERATURE: 98.1 F | DIASTOLIC BLOOD PRESSURE: 60 MMHG | WEIGHT: 177 LBS

## 2019-07-13 DIAGNOSIS — J18.9 MULTIFOCAL PNEUMONIA: Primary | ICD-10-CM

## 2019-07-13 PROCEDURE — 99213 OFFICE O/P EST LOW 20 MIN: CPT | Performed by: NURSE PRACTITIONER

## 2019-07-13 RX ORDER — GUAIFENESIN 400 MG/1
400 TABLET ORAL EVERY 8 HOURS PRN
Qty: 30 TABLET | Refills: 0 | Status: SHIPPED | OUTPATIENT
Start: 2019-07-13

## 2019-07-13 RX ORDER — DOXYCYCLINE HYCLATE 100 MG
100 TABLET ORAL 2 TIMES DAILY
Qty: 20 TABLET | Refills: 0 | Status: SHIPPED | OUTPATIENT
Start: 2019-07-13 | End: 2019-07-23

## 2019-07-13 NOTE — PROGRESS NOTES
Assessment/Plan     Community acquired pneumonia  The patient was counseled regarding instructions for management,-- risk factor reductions,-- prognosis,-- impressions,-- risks and benefits of treatment options,-- importance of compliance with treatment  I have reviewed the instructions with the patient, answering all questions to her satisfaction  The diagnosis was discussed along with the usual course and treatment  Educational material distributed  Restarted antibiotics per medication orders  Follow up in 5 days or as needed  Patient declines CXR and ER referral at this time  Agrees to go to [facility] ER if sxs do not improve/worsen  She will call Monday with update    Subjective     Clemon Canavan is a 40 y o  female who presents for  follow up on pneumonia  Was hospitalized at [facility] 6/20-7/3/19 s/p PEA arrest in the field  Was resusitated, intubated  +multifocal PNA during hospital stay  Full record not avail  Per pt she was given IV and PO antibiotics  She did not know name  Likely not macrolide or fluoroquinolone d/t diagnosis of Qt prolongation as cause of cardiac arrest  Today she reports relapse of productive cough, subjective fever, chills, wheezing  Symptoms began last night  Treatment thus far includes: anti-tussive: not very effective  The following portions of the patient's history were reviewed and updated as appropriate: allergies, current medications, past family history, past medical history, past social history, past surgical history and problem list     Review of Systems  Pertinent items are noted in HPI       Objective     Oxygen saturation 97% on room air  /60 (BP Location: Left arm, Patient Position: Sitting, Cuff Size: Standard)   Pulse 60   Temp 98 1 °F (36 7 °C) (Tympanic)   Resp 16   Ht 5' 5" (1 651 m)   Wt 80 3 kg (177 lb)   LMP 09/06/2017   SpO2 97%   BMI 29 45 kg/m²   General appearance: alert and oriented, in no acute distress  Throat: abnormal findings: mild oropharyngeal erythema  Lungs: rhonchi  Heart: regular rate and rhythm, S1, S2 normal, no murmur, click, rub or gallop  Extremities: extremities normal, warm and well-perfused; no cyanosis, clubbing, or edema  Pulses: 2+ and symmetric  Lymph nodes: Cervical, supraclavicular, and axillary nodes normal   Neurologic: Grossly normal

## 2019-07-15 ENCOUNTER — TELEPHONE (OUTPATIENT)
Dept: NEUROLOGY | Facility: CLINIC | Age: 44
End: 2019-07-15

## 2019-07-15 ENCOUNTER — OFFICE VISIT (OUTPATIENT)
Dept: FAMILY MEDICINE CLINIC | Facility: CLINIC | Age: 44
End: 2019-07-15
Payer: COMMERCIAL

## 2019-07-15 ENCOUNTER — HOSPITAL ENCOUNTER (OUTPATIENT)
Dept: RADIOLOGY | Facility: HOSPITAL | Age: 44
Discharge: HOME | End: 2019-07-15
Payer: COMMERCIAL

## 2019-07-15 ENCOUNTER — TELEPHONE (OUTPATIENT)
Dept: OTHER | Facility: OTHER | Age: 44
End: 2019-07-15

## 2019-07-15 ENCOUNTER — TRANSCRIBE ORDERS (OUTPATIENT)
Dept: ADMINISTRATIVE | Facility: HOSPITAL | Age: 44
End: 2019-07-15

## 2019-07-15 ENCOUNTER — TELEPHONE (OUTPATIENT)
Dept: FAMILY MEDICINE CLINIC | Facility: CLINIC | Age: 44
End: 2019-07-15

## 2019-07-15 DIAGNOSIS — I95.1 ORTHOSTASIS: Primary | ICD-10-CM

## 2019-07-15 DIAGNOSIS — R10.13 EPIGASTRIC PAIN: ICD-10-CM

## 2019-07-15 DIAGNOSIS — J18.9 MULTIFOCAL PNEUMONIA: Primary | ICD-10-CM

## 2019-07-15 DIAGNOSIS — R11.0 NAUSEA: ICD-10-CM

## 2019-07-15 DIAGNOSIS — I95.9 HYPOTENSION, UNSPECIFIED HYPOTENSION TYPE: ICD-10-CM

## 2019-07-15 DIAGNOSIS — I45.81 PROLONGED QT INTERVAL SYNDROME: ICD-10-CM

## 2019-07-15 DIAGNOSIS — I27.20 PULMONARY HYPERTENSION (HCC): ICD-10-CM

## 2019-07-15 DIAGNOSIS — Z86.74 HISTORY OF CARDIAC ARREST: ICD-10-CM

## 2019-07-15 DIAGNOSIS — R42 SEVERE DIZZINESS: ICD-10-CM

## 2019-07-15 DIAGNOSIS — J18.9 MULTIFOCAL PNEUMONIA: ICD-10-CM

## 2019-07-15 PROCEDURE — 99213 OFFICE O/P EST LOW 20 MIN: CPT | Performed by: NURSE PRACTITIONER

## 2019-07-15 PROCEDURE — 71046 X-RAY EXAM CHEST 2 VIEWS: CPT

## 2019-07-15 RX ORDER — METOPROLOL SUCCINATE 25 MG/1
25 TABLET, EXTENDED RELEASE ORAL DAILY
Qty: 30 TABLET | Refills: 5 | Status: SHIPPED | OUTPATIENT
Start: 2019-07-15 | End: 2019-07-26 | Stop reason: SDUPTHER

## 2019-07-15 RX ORDER — SUCRALFATE ORAL 1 G/10ML
1 SUSPENSION ORAL
Qty: 1200 ML | Refills: 1 | Status: SHIPPED | OUTPATIENT
Start: 2019-07-15 | End: 2019-09-06 | Stop reason: SDUPTHER

## 2019-07-15 NOTE — TELEPHONE ENCOUNTER
Isai Neri 1975  CONFIDENTIALTY NOTICE: This fax transmission is intended only for the addressee  It contains information that is legally privileged,  confidential or otherwise protected from use or disclosure  If you are not the intended recipient, you are strictly prohibited from reviewing,  disclosing, copying using or disseminating any of this information or taking any action in reliance on or regarding this information  If you have  received this fax in error, please notify us immediately by telephone so that we can arrange for its return to us  Page:  2  Call Id: [id number]  Health Call  Standard Call Report  Health Call  Patient Name: Isai Neri  Gender: Female  : 1975  Age: 40 Y 1 M 2 D  Return Phone  Number: [phone] (Home)  Address: [place] [address]   City/State/Zip: [address] [place] [address]  Practice Name: Fabienne Pope 3  Practice Charged:  Physician:  [facility] Name:  Relationship To  Patient: Self  Return Phone Number: [phone] (Home)  Presenting Problem: "I was put on antibiotics on Saturday,  spent all yesterday throwing up and  woke up so lightheaded that I cannot  turn my head "  Service Type: Triage  Charged Service 1: Paola Ji U  38  Name and  Number:  Nurse Assessment  Nurse: Lizbet Brandt Date/Time: 7/15/2019 8:25:00 AM  Type of assessment required:  ---General (Adult or Child)  Duration of Current S/S  ---Yesterday  Location/Radiation  ---GI  Temperature (F) and route:  ---Denies  Symptom Specific Meds (Dose/Time):  ---Antbiotics for PNA  Other S/S  ---Diagnosed with PNA and started on ABX  Feels very dizzy when she turns her head  and lightheaded  Vomited yesterday but, today feel nauseous  Pain Scale on scale of 1-10, 10 being the worst:  ---None  Symptom progression:  ---worse  Intake and Output  Isai Neri 1975  CONFIDENTIALTY NOTICE: This fax transmission is intended only for the addressee   It contains information that is legally privileged,  confidential or otherwise protected from use or disclosure  If you are not the intended recipient, you are strictly prohibited from reviewing,  disclosing, copying using or disseminating any of this information or taking any action in reliance on or regarding this information  If you have  received this fax in error, please notify us immediately by telephone so that we can arrange for its return to us  Page: 2 of 2  Call Id: [id number]  Nurse Assessment  ---Drinking lots of fluids  Did urinate this morning  LMP/ Pregnancy:  ---Not Assessed  Breastfeeding  ---No  Last Exam/Treatment:  ---7/13 diagnosed with PNA  Protocols  Protocol Title Nurse Date/Time  Infection on Antibiotic Follow-up Call Yaneth Fernandez 7/15/2019 8:32:32 AM  Question Caller Affirmed  Disp  Time Disposition Final User  7/15/2019 8:31:53 AM RN Triaged Yaneth Fernandez  7/15/2019 8:33:39 AM Call PCP within 24 Hours Yes KIYA Avelar, [facility] Advice Given Per Protocol  CALL PCP WITHIN 24 HOURS: You need to discuss this with your doctor within the next 24 hours  * IF OFFICE WILL BE OPEN:  Call the office when it opens tomorrow morning  NOTE TO TRIAGER - Treat the Symptom: * Provided targeted care advice for the  patient's symptom * Refer to relevant guideline as needed for specific instructions  CALL BACK IF: * You become worse  CARE  ADVICE given per Infection on Antibiotics Follow-Up Call (Adult) guideline  Caller Understands: Yes  Caller Disagree/Comply: Comply  PreDisposition: Unsure  Comments  User: Grzegorz Cerna Date/Time: 7/15/2019 8:34:13 AM  Patient states it still hurts for her to take a deep breath in and coughing still present

## 2019-07-15 NOTE — TELEPHONE ENCOUNTER
I phoned Dr Stephanie Michael and Dr Subramanian [facility] office for appts for the pt   Pt has an appt scheduled with Dr Stephanie Michael for July 18th at 11am she also has an appt scheduled with Dr Dolly Wilkins for July 22nd if the pt ins will not pay for that visit because it is in [place] the pt will have to wait until Sept to see Dr Dolly Wilkins because that is the next time he will be in [place]

## 2019-07-15 NOTE — PROGRESS NOTES
Assessment/Plan:    Problem List Items Addressed This Visit     None      Visit Diagnoses     Orthostasis    -  Primary    Relevant Orders    Ambulatory referral to Neurology    Ambulatory referral to Cardiology    Severe dizziness        Relevant Orders    Ambulatory referral to Neurology    Ambulatory referral to Cardiology    Hypotension, unspecified hypotension type        Relevant Orders    Ambulatory referral to Neurology    Ambulatory referral to Cardiology    Prolonged QT interval syndrome        Relevant Medications    metoprolol succinate (TOPROL XL) 25 mg 24 hr tablet    Other Relevant Orders    Ambulatory referral to Cardiology    History of cardiac arrest        Relevant Orders    Ambulatory referral to Cardiology    Epigastric pain        Relevant Medications    sucralfate (CARAFATE) 1 g/10 mL suspension    Nausea        Pulmonary hypertension ([address] )        Relevant Orders    Ambulatory referral to Pulmonology        The patient was counseled regarding instructions for management,-- risk factor reductions,-- prognosis,-- impressions,-- risks and benefits of treatment options,-- importance of compliance with treatment  I have reviewed the instructions with the patient, answering all questions to her satisfaction  Advised to cut toprol down to 25mg  Dose may be contributing to dizziness, low bp  Cont doxy with food  Will see back on wed for recheck  Advised ER if sxs progress as discussed which she agreed to   Return in about 2 days (around 7/17/2019)  Subjective:      Patient ID: Mike Oliver is a 40 y o  female  Chief Complaint   Patient presents with    other     pt states she is here because of dizziness, nausea and vomitting x 3       Here for nausea, dizziness that started after starting antibiotic (doxy) for possible PNA recurrance (seen on Sat 7/13)  Was recently discharged from [facility] following a complex admission     Was hospitalized at [facility] 6/20-7/3/19 s/p PEA arrest in the field  Was resusitated, intubated  Cath neg for CAD  She was dx'd with drug related Prolonged QT syndrome  +multifocal PNA during hospital stay  See office note 7/12  BP on the low side  She is refusing orthostatic Bps at the present time  Today, it was discovered that she was prescribed Toprol XL 50mg by [facility]  Has been taking it  Records not avail at [facility] office visit, This medication was not reported to us  Has not yet made cardio f/u appointment    Ongoing thyroid issues  Latest TSH >90  Suspect noncompliance  Endo, Dr Kristy Christian, managing very closely,      The following portions of the patient's history were reviewed and updated as appropriate: allergies, current medications, past family history, past medical history, past social history, past surgical history and problem list     Review of Systems   Constitutional: Positive for fatigue  Negative for fever  Respiratory: Negative  Cardiovascular: Negative  Gastrointestinal: Positive for nausea and vomiting  Negative for abdominal pain and blood in stool  Neurological: Positive for dizziness and weakness  Negative for tremors, seizures, syncope, facial asymmetry, speech difficulty, numbness and headaches           Current Outpatient Medications   Medication Sig Dispense Refill    albuterol (2 5 mg/3 mL) 0 083 % nebulizer solution Take 2 5 mg by nebulization every 6 (six) hours as needed for wheezing      apixaban (ELIQUIS) 5 mg Take 5 mg by mouth 2 (two) times a day      buPROPion (WELLBUTRIN XL) 300 mg 24 hr tablet Take 300 mg by mouth every morning      calcitriol (ROCALTROL) 0 5 MCG capsule Take 1 capsule (0 5 mcg total) by mouth 2 (two) times a day  0    Calcium Carb-Cholecalciferol (CALCIUM 600 + D PO) 3 tablets 4 times Daily       chlorhexidine (PERIDEX) 0 12 % solution       Cholecalciferol (VITAMIN D PO) Take 50,000 Units by mouth every 5 (five) days       clonazePAM (KlonoPIN) 1 mg tablet Take 1 mg by mouth daily at bedtime       Cyanocobalamin (VITAMIN B 12 PO) Take 1,000 mcg by mouth daily      divalproex sodium (DEPAKOTE) 250 mg EC tablet Take 250 mg by mouth 2 (two) times a day      doxycycline hyclate (VIBRA-TABS) 100 mg tablet Take 1 tablet (100 mg total) by mouth 2 (two) times a day for 10 days 20 tablet 0    fluticasone (FLONASE) 50 mcg/act nasal spray 2 sprays into each nostril daily 16 g 0    folic acid (FOLVITE) 1 mg tablet Take 1 tablet (1,000 mcg total) by mouth daily 30 tablet 6    gabapentin (NEURONTIN) 300 mg capsule TAKE ONE CAPSULE BY MOUTH TWICE A DAY AND TAKE TWO CAPSULES BY MOUTH IN THE EVENING (Patient taking differently: one tablet 3x daily) 120 capsule 6    guaiFENesin 400 mg Take 1 tablet (400 mg total) by mouth every 8 (eight) hours as needed for cough 30 tablet 0    levothyroxine 150 mcg tablet Take 1 tablet (150 mcg total) by mouth daily (Patient taking differently: Take 175 mcg by mouth daily )  0    magnesium oxide (MAG-OX) 400 mg Take 400 mg by mouth 3 (three) times a day      metoprolol succinate (TOPROL XL) 25 mg 24 hr tablet Take 1 tablet (25 mg total) by mouth daily 30 tablet 5    montelukast (SINGULAIR) 10 mg tablet TAKE ONE TABLET BY MOUTH EVERY DAY AS DIRECTED 30 tablet 1    Multiple Vitamin (MULTIVITAMIN) tablet Take 1 tablet by mouth daily      ONETOUCH VERIO test strip       potassium chloride (K-DUR,KLOR-CON) 20 mEq tablet Take 20 mEq by mouth daily      SM ASPIRIN ADULT LOW STRENGTH 81 MG EC tablet TAKE ONE TABLET BY MOUTH EVERY DAY AS DIRECTED 30 tablet 3    sucralfate (CARAFATE) 1 g/10 mL suspension Take 10 mL (1 g total) by mouth 4 (four) times a day (with meals and at bedtime) 1200 mL 1    traMADol-acetaminophen (ULTRACET) 37 5-325 mg per tablet Take 1 tablet by mouth every 12 (twelve) hours as needed for moderate pain 30 tablet 0    Zolpidem Tartrate (AMBIEN PO) Take 10 mg by mouth daily at bedtime       No current facility-administered medications for this visit          Objective:    BP 90/60   Pulse 70   Temp 98 4 °F (36 9 °C)   Resp 16   LMP 09/06/2017        Physical Exam       Oxygen saturation 97% on room air  General appearance: alert and oriented, in no acute distress, appears fatigued  Eyes: normal, no nystagimus  Throat: abnormal findings: mild oropharyngeal erythema  Lungs: rhonchi  Heart: regular rate and rhythm, S1, S2 normal, no murmur, click, rub or gallop  Extremities: extremities normal, warm and well-perfused; no cyanosis, clubbing, or edema  Pulses: 2+ and symmetric  Lymph nodes: Cervical, supraclavicular, and axillary nodes normal   Neurologic: Grossly normal         Ada Tao, CRNP

## 2019-07-15 NOTE — TELEPHONE ENCOUNTER
Harshil aDniels,  Pt  States was nauseous and vomited many times yesterday  She is still dizzy and almost fainted this morning when she go up to go to the bathroom  When she turns her head the room spins  Her fbs this am was 76 and after oatmeal it is 108  Please advise  Do you want to squeeze her into your schedule today?

## 2019-07-15 NOTE — TELEPHONE ENCOUNTER
Also, received call from PT, Albert Barrera stating that since pt  Has pneumonia, she will put off care until next week

## 2019-07-16 ENCOUNTER — TELEPHONE (OUTPATIENT)
Dept: NEUROLOGY | Facility: CLINIC | Age: 44
End: 2019-07-16

## 2019-07-16 VITALS
DIASTOLIC BLOOD PRESSURE: 60 MMHG | TEMPERATURE: 98.4 F | HEART RATE: 70 BPM | RESPIRATION RATE: 16 BRPM | SYSTOLIC BLOOD PRESSURE: 90 MMHG

## 2019-07-16 LAB
ARSENIC BLD-MCNC: 5 UG/L (ref 2–23)
LEAD BLD-MCNC: NORMAL UG/DL (ref 0–4)
MERCURY BLD-MCNC: NORMAL UG/L (ref 0–14.9)

## 2019-07-16 NOTE — TELEPHONE ENCOUNTER
We wanted emu admission and due to her insurance she could only go to [place] facility  If she is able to come to us for EMU then yes would like that

## 2019-07-16 NOTE — PATIENT INSTRUCTIONS
Hypotension   WHAT YOU NEED TO KNOW:   Hypotension is a condition that causes your blood pressure (BP) to drop lower than it should be  Hypotension may be mild, serious, or life-threatening  DISCHARGE INSTRUCTIONS:   Medicines:   · Alpha-adrenoreceptor agonists: These medicines may increase your BP and decrease your symptoms  Take these medicines as directed  · Steroids: This medicine helps prevent salt loss from your body  Steroids may also help increase the amount of fluid in your body and raise your BP  · Vasopressors: These medicines help constrict (make smaller) your blood vessels and increase your BP  Vasopressor medicines may increase the blood flow to your brain and help decrease your symptoms  · Antidiuretic hormone: This medicine helps control your BP and helps decrease your need to urinate during the night  · Antiparkinson medicine: This medicine may help increase your standing BP and decrease your symptoms  · Take your medicine as directed  Contact your healthcare provider if you think your medicine is not helping or if you have side effects  Tell him of her if you are allergic to any medicine  Keep a list of the medicines, vitamins, and herbs you take  Include the amounts, and when and why you take them  Bring the list or the pill bottles to follow-up visits  Carry your medicine list with you in case of an emergency  Follow up with your healthcare provider or specialist as directed:  Write down your questions so you remember to ask them during your visits  Check your blood pressure: You may need to check your BP at home  Record the results and bring them with you to follow-up visits  Ask when and how often to check your BP  You may need to wear a BP monitor for up to 24 hours  This will record your blood pressure during your normal daily activities  The monitor will take your BP every 15 to 30 minutes  Try to keep still while your BP is taken   Avoid heavy activity, such as exercise, while you are wearing the monitor  Manage your symptoms:   · Change positions slowly:  When you get out of bed, sit up first, then slowly move your legs to the side of the bed  If you are not having any symptoms, slowly stand up  If you have symptoms, sit down right away  · Exercise and do physical counter maneuvers:  Ask your healthcare provider or specialist about the best exercise plan for you  Physical counter maneuvers may help to increase your BP and increase blood flow to your heart  They include crossing your legs, squatting, and bending at the waist  You can also rise up on your toes while you are standing, and tighten your thigh muscles  · Drink liquids as directed:  Ask your healthcare provider or specialist how much liquid to drink each day and which liquids are best for you  Drink 500 milliliters (½ liter) of liquid quickly in the morning or before meals to help increase your BP  Your healthcare provider may tell you to drink 2 cups of coffee with, or after, breakfast and lunch  The caffeine in the coffee can help prevent a drop in your BP  Your healthcare provider may also give you caffeine pills  · Change how you eat meals: If your BP drops after eating large meals, try to eat smaller meals more often  Eat foods low in carbohydrates and cholesterol to help prevent BP drops after you eat  Ask if you need to increase the amount of sodium (salt) you eat each day  · Raise the head of your bed:  Raise the head of your bed 4 to 8 inches  This may help prevent morning BP drops and decrease the need to urinate during the night  Avoid things that make your hypotension worse:   · Do not drink alcohol:  Alcohol can make your symptoms worse  Ask for information if you need help quitting  · Avoid straining:  Activities and movements that cause you to strain can cause a drop in your BP   Activities to avoid include lifting, coughing, and other movements that increase the feeling of pressure in your chest     · Avoid the heat: This can cause a decrease in your BP  Stay inside during very hot days, or limit the amount of time you are outside  Do not take hot baths  Contact your healthcare provider or specialist if:   · You vomit several times or have diarrhea, and you cannot drink liquid  · You have a fever  · You have new or increased symptoms, such as dizziness, weakness, or fainting  · Your legs, ankles, and feet are swollen, or you gain weight for no known reason  · You have questions or concerns about your condition or care  Return to the emergency department if:   · You become confused or cannot speak  · You urinate very little or not at all  · You have a seizure  · You have chest pain or trouble breathing  · You have changes in vision or cannot see  © 2017 [address] Information is for End User's use only and may not be sold, redistributed or otherwise used for commercial purposes  All illustrations and images included in CareNotes® are the copyrighted property of [employer]  or [employer]  The above information is an  only  It is not intended as medical advice for individual conditions or treatments  Talk to your doctor, nurse or pharmacist before following any medical regimen to see if it is safe and effective for you

## 2019-07-17 ENCOUNTER — OFFICE VISIT (OUTPATIENT)
Dept: FAMILY MEDICINE CLINIC | Facility: CLINIC | Age: 44
End: 2019-07-17
Payer: COMMERCIAL

## 2019-07-17 VITALS
HEIGHT: 65 IN | WEIGHT: 176.4 LBS | DIASTOLIC BLOOD PRESSURE: 60 MMHG | TEMPERATURE: 97.2 F | HEART RATE: 80 BPM | SYSTOLIC BLOOD PRESSURE: 80 MMHG | RESPIRATION RATE: 14 BRPM | OXYGEN SATURATION: 98 % | BODY MASS INDEX: 29.39 KG/M2

## 2019-07-17 DIAGNOSIS — I82.411 ACUTE DEEP VEIN THROMBOSIS (DVT) OF RIGHT FEMORAL VEIN (HCC): ICD-10-CM

## 2019-07-17 DIAGNOSIS — Z09 FOLLOW UP: Primary | ICD-10-CM

## 2019-07-17 DIAGNOSIS — J18.9 MULTIFOCAL PNEUMONIA: ICD-10-CM

## 2019-07-17 DIAGNOSIS — I95.1 ORTHOSTASIS: ICD-10-CM

## 2019-07-17 DIAGNOSIS — I45.81 PROLONGED QT INTERVAL SYNDROME: ICD-10-CM

## 2019-07-17 DIAGNOSIS — Z86.74 HISTORY OF CARDIAC ARREST: ICD-10-CM

## 2019-07-17 PROCEDURE — 99213 OFFICE O/P EST LOW 20 MIN: CPT | Performed by: NURSE PRACTITIONER

## 2019-07-17 NOTE — PROGRESS NOTES
Assessment/Plan:    Problem List Items Addressed This Visit        Respiratory    Multifocal pneumonia       Cardiovascular and Mediastinum    Prolonged QT interval syndrome    Orthostasis    Acute deep vein thrombosis (DVT) of right femoral vein (HCC)       Other    History of cardiac arrest      Other Visit Diagnoses     Follow up    -  Primary        Pt clinically stable today  No significant improvement in bps (still hypotensive) since decrease in toprol dose however she is no longer symptomatic  Will defer to cardio to direct topral dose in setting of recent vt arrest  She sees Dr Edward Sainz tomorrow and Dr Farzad Barber next week  Recovering from PNA  Lungs clear today  Eventual Cardio-pulm rehab when cleared by specialists     The patient was counseled regarding instructions for management,-- risk factor reductions,-- prognosis,-- impressions,-- risks and benefits of treatment options,-- importance of compliance with treatment  I have reviewed the instructions with the patient, answering all questions to her satisfaction  Advised to cut toprol down to 25mg  Dose may be contributing to dizziness, low bp  Cont doxy with food  Will see back on wed for recheck  Advised ER if sxs progress as discussed which she agreed to   No follow-ups on file  Subjective:      Patient ID: Abdelrahman Funes is a 40 y o  female  Chief Complaint   Patient presents with    Follow-up     cough, BP and pneumonia    Dizziness       Here for recheck of nausea, dizziness that started after starting antibiotic (doxy) for possible PNA recurrance (seen on Sat 7/13)  She reports feeling better  Not as tired, coughing less, dizziness improved since toprol dose decreased    Was recently discharged from [facility] following a complex admission  Was hospitalized at [facility] 6/20-7/3/19 s/p PEA arrest in the field  Was resusitated, intubated  Cath neg for CAD  She was dx'd with drug related Prolonged QT syndrome  +multifocal PNA during hospital stay  See office note 7/12  BP on the low side  She is refusing orthostatic Bps at the present time  she was prescribed Toprol XL 50mg by [facility]  Dose was decreased to 25mg by me at last ov  She purchased a bp cuff  Home bps are /60s  She is more conscientious and making slow position changes  Ongoing thyroid issues  Latest TSH >90  Suspect noncompliance  Endo, Dr Mal Fong, managing very closely, She has an appointment in 2 weeks    Dizziness   Associated symptoms include fatigue, nausea and weakness  Pertinent negatives include no abdominal pain, fever, headaches, numbness or vomiting  The following portions of the patient's history were reviewed and updated as appropriate: allergies, current medications, past family history, past medical history, past social history, past surgical history and problem list     Review of Systems   Constitutional: Positive for fatigue  Negative for fever  Respiratory: Negative  Cardiovascular: Negative  Gastrointestinal: Positive for nausea  Negative for abdominal pain, blood in stool and vomiting  Neurological: Positive for dizziness and weakness  Negative for tremors, seizures, syncope, facial asymmetry, speech difficulty, numbness and headaches           Current Outpatient Medications   Medication Sig Dispense Refill    albuterol (2 5 mg/3 mL) 0 083 % nebulizer solution Take 2 5 mg by nebulization every 6 (six) hours as needed for wheezing      apixaban (ELIQUIS) 5 mg Take 5 mg by mouth 2 (two) times a day      buPROPion (WELLBUTRIN XL) 300 mg 24 hr tablet Take 300 mg by mouth every morning      calcitriol (ROCALTROL) 0 5 MCG capsule Take 1 capsule (0 5 mcg total) by mouth 2 (two) times a day  0    Calcium Carb-Cholecalciferol (CALCIUM 600 + D PO) 3 tablets 4 times Daily       chlorhexidine (PERIDEX) 0 12 % solution       Cholecalciferol (VITAMIN D PO) Take 50,000 Units by mouth every 5 (five) days       clonazePAM (KlonoPIN) 1 mg tablet Take 1 mg by mouth daily at bedtime       Cyanocobalamin (VITAMIN B 12 PO) Take 1,000 mcg by mouth daily      divalproex sodium (DEPAKOTE) 250 mg EC tablet Take 250 mg by mouth 2 (two) times a day      doxycycline hyclate (VIBRA-TABS) 100 mg tablet Take 1 tablet (100 mg total) by mouth 2 (two) times a day for 10 days 20 tablet 0    fluticasone (FLONASE) 50 mcg/act nasal spray 2 sprays into each nostril daily 16 g 0    folic acid (FOLVITE) 1 mg tablet Take 1 tablet (1,000 mcg total) by mouth daily 30 tablet 6    gabapentin (NEURONTIN) 300 mg capsule TAKE ONE CAPSULE BY MOUTH TWICE A DAY AND TAKE TWO CAPSULES BY MOUTH IN THE EVENING (Patient taking differently: one tablet 3x daily) 120 capsule 6    guaiFENesin 400 mg Take 1 tablet (400 mg total) by mouth every 8 (eight) hours as needed for cough 30 tablet 0    levothyroxine 150 mcg tablet Take 1 tablet (150 mcg total) by mouth daily (Patient taking differently: Take 175 mcg by mouth daily )  0    magnesium oxide (MAG-OX) 400 mg Take 400 mg by mouth 3 (three) times a day      metoprolol succinate (TOPROL XL) 25 mg 24 hr tablet Take 1 tablet (25 mg total) by mouth daily 30 tablet 5    montelukast (SINGULAIR) 10 mg tablet TAKE ONE TABLET BY MOUTH EVERY DAY AS DIRECTED 30 tablet 1    Multiple Vitamin (MULTIVITAMIN) tablet Take 1 tablet by mouth daily      ONETOUCH VERIO test strip       potassium chloride (K-DUR,KLOR-CON) 20 mEq tablet Take 20 mEq by mouth daily      SM ASPIRIN ADULT LOW STRENGTH 81 MG EC tablet TAKE ONE TABLET BY MOUTH EVERY DAY AS DIRECTED 30 tablet 3    sucralfate (CARAFATE) 1 g/10 mL suspension Take 10 mL (1 g total) by mouth 4 (four) times a day (with meals and at bedtime) 1200 mL 1    traMADol-acetaminophen (ULTRACET) 37 5-325 mg per tablet Take 1 tablet by mouth every 12 (twelve) hours as needed for moderate pain 30 tablet 0    Zolpidem Tartrate (AMBIEN PO) Take 10 mg by mouth daily at bedtime       No current facility-administered medications for this visit          Objective:    BP (!) 80/60 (BP Location: Left arm, Patient Position: Sitting, Cuff Size: Large)   Pulse 80   Temp (!) 97 2 °F (36 2 °C)   Resp 14   Ht 5' 5" (1 651 m)   Wt 80 kg (176 lb 6 4 oz)   LMP 09/06/2017   SpO2 98%   BMI 29 35 kg/m²        Physical Exam       Oxygen saturation 97% on room air  General appearance: alert and oriented, in no acute distress, appears fatigued  Eyes: normal, no nystagimus  Throat: abnormal findings: mild oropharyngeal erythema  Lungs: CTA  Heart: regular rate and rhythm, S1, S2 normal, no murmur, click, rub or gallop  Extremities: extremities normal, warm and well-perfused; no cyanosis, clubbing, or edema  Pulses: 2+ and symmetric  Lymph nodes: Cervical, supraclavicular, and axillary nodes normal   Neurologic: Grossly normal         DARIAN Heck

## 2019-07-18 ENCOUNTER — OFFICE VISIT (OUTPATIENT)
Dept: CARDIOLOGY CLINIC | Facility: CLINIC | Age: 44
End: 2019-07-18
Payer: COMMERCIAL

## 2019-07-18 ENCOUNTER — TELEPHONE (OUTPATIENT)
Dept: FAMILY MEDICINE CLINIC | Facility: CLINIC | Age: 44
End: 2019-07-18

## 2019-07-18 VITALS
OXYGEN SATURATION: 98 % | HEART RATE: 72 BPM | BODY MASS INDEX: 29.32 KG/M2 | SYSTOLIC BLOOD PRESSURE: 106 MMHG | WEIGHT: 176 LBS | HEIGHT: 65 IN | DIASTOLIC BLOOD PRESSURE: 72 MMHG

## 2019-07-18 DIAGNOSIS — I95.1 ORTHOSTASIS: ICD-10-CM

## 2019-07-18 DIAGNOSIS — E03.9 ACQUIRED HYPOTHYROIDISM: ICD-10-CM

## 2019-07-18 DIAGNOSIS — Z86.74 HISTORY OF CARDIAC ARREST: Primary | ICD-10-CM

## 2019-07-18 DIAGNOSIS — R07.89 PAIN, CHEST WALL: ICD-10-CM

## 2019-07-18 DIAGNOSIS — J45.20 MILD INTERMITTENT REACTIVE AIRWAY DISEASE WITHOUT COMPLICATION: Chronic | ICD-10-CM

## 2019-07-18 DIAGNOSIS — Z86.69 HX OF MIGRAINE HEADACHES: ICD-10-CM

## 2019-07-18 DIAGNOSIS — Z86.79 HISTORY OF SUSTAINED VENTRICULAR TACHYCARDIA: ICD-10-CM

## 2019-07-18 DIAGNOSIS — I82.411 ACUTE DEEP VEIN THROMBOSIS (DVT) OF RIGHT FEMORAL VEIN (HCC): ICD-10-CM

## 2019-07-18 DIAGNOSIS — J30.1 SEASONAL ALLERGIC RHINITIS DUE TO POLLEN: ICD-10-CM

## 2019-07-18 PROCEDURE — 99244 OFF/OP CNSLTJ NEW/EST MOD 40: CPT | Performed by: INTERNAL MEDICINE

## 2019-07-18 PROCEDURE — 93000 ELECTROCARDIOGRAM COMPLETE: CPT | Performed by: INTERNAL MEDICINE

## 2019-07-18 RX ORDER — CETIRIZINE HYDROCHLORIDE 10 MG/1
10 TABLET ORAL DAILY
COMMUNITY

## 2019-07-18 RX ORDER — ACETAMINOPHEN 500 MG
500 TABLET ORAL EVERY 8 HOURS PRN
Status: SHIPPED | OUTPATIENT
Start: 2019-07-19

## 2019-07-18 RX ORDER — FLUTICASONE PROPIONATE 50 MCG
2 SPRAY, SUSPENSION (ML) NASAL DAILY PRN
Qty: 16 G | Refills: 0
Start: 2019-07-18

## 2019-07-18 RX ORDER — LEVOTHYROXINE SODIUM 175 UG/1
175 TABLET ORAL DAILY
Qty: 30 TABLET | Refills: 5
Start: 2019-07-18

## 2019-07-18 RX ORDER — DIPHENHYDRAMINE HCL 25 MG
25 TABLET ORAL EVERY 6 HOURS PRN
Qty: 30 TABLET | Refills: 0
Start: 2019-07-18

## 2019-07-18 RX ORDER — OXYCODONE HYDROCHLORIDE AND ACETAMINOPHEN 5; 325 MG/1; MG/1
1 TABLET ORAL EVERY 6 HOURS PRN
Qty: 30 TABLET | Refills: 0
Start: 2019-07-18 | End: 2019-07-26 | Stop reason: ALTCHOICE

## 2019-07-18 RX ORDER — ALBUTEROL SULFATE 90 UG/1
2 AEROSOL, METERED RESPIRATORY (INHALATION) EVERY 6 HOURS PRN
Qty: 18 G | Refills: 5
Start: 2019-07-18

## 2019-07-18 RX ORDER — ACETAMINOPHEN 500 MG
500 TABLET ORAL EVERY 8 HOURS PRN
Status: DISCONTINUED | OUTPATIENT
Start: 2019-07-18 | End: 2019-07-18

## 2019-07-18 NOTE — PATIENT INSTRUCTIONS
1  Continue current medication  2  Call us if there is any question about the safety of a future medication  3  Cardiology follow-up in 6 weeks with rhythm strip

## 2019-07-18 NOTE — PROGRESS NOTES
Consultation - Cardiology   Emma Perera 40 y o  female MRN: [id number]  Unit/Bed#:  Encounter: [id number]        Assessment/Plan     Assessment:    1  Status post recurrent unresponsiveness, likely due to ventricular tachycardia, caused by QT prolonging psychotropic medication, and finally documented on 06/20/2019 with hospitalization at [facility] until 07/03/2019  No evidence of structural or ischemic heart disease found on extensive workup  2  Resolved hypotension following reduction in dose of metoprolol by primary care  3  Chronic recurrent migraine headaches with chronic nausea and occasional vomiting, for which patient has recently been smoking marijuana  4  Longstanding depression and anxiety  5  Recently diagnosed acute deep venous thrombosis of right common femoral vein extending into thigh of right lower extremity, now being treated with Eliquis  6  Treated but uncontrolled acquired hypothyroidism, currently followed by Dr Herrera of  endocrinology at [facility]  7  Recent treatment for presumed aspiration pneumonia during 06/20/2019 hospital stay with recurrence of cough and sputum production since 07/12/2019, now improving  8  History of chronic hypocalcemia  9  Chronic overweight with apparent former marked obesity and bariatric surgery in 2011  Plan:    Patient Instructions     1  Continue current medication  2  Call us if there is any question about the safety of a future medication  3  Cardiology follow-up in 6 weeks with rhythm strip        Current Outpatient Medications   Medication Sig Dispense Refill    albuterol (2 5 mg/3 mL) 0 083 % nebulizer solution Take 2 5 mg by nebulization every 6 (six) hours as needed for wheezing      apixaban (ELIQUIS) 5 mg Take 5 mg by mouth 2 (two) times a day      buPROPion (WELLBUTRIN XL) 300 mg 24 hr tablet Take 300 mg by mouth every morning      calcitriol (ROCALTROL) 0 5 MCG capsule Take 1 capsule (0 5 mcg total) by mouth 2 (two) times a day  0    Calcium Carb-Cholecalciferol (CALCIUM 600 + D PO) 3 tablets 4 times Daily       cetirizine (ZyrTEC) 10 mg tablet Take 10 mg by mouth daily      chlorhexidine (PERIDEX) 0 12 % solution       Cholecalciferol (VITAMIN D PO) Take 50,000 Units by mouth every 5 (five) days       clonazePAM (KlonoPIN) 1 mg tablet Take 0 5 mg by mouth 2 (two) times a day 1/2 tablet twice a day      Cyanocobalamin (VITAMIN B 12 PO) Take 1,000 mcg by mouth daily      divalproex sodium (DEPAKOTE) 250 mg EC tablet Take 250 mg by mouth 2 (two) times a day      doxycycline hyclate (VIBRA-TABS) 100 mg tablet Take 1 tablet (100 mg total) by mouth 2 (two) times a day for 10 days 20 tablet 0    fluticasone (FLONASE) 50 mcg/act nasal spray 2 sprays into each nostril daily as needed for rhinitis 16 g 0    folic acid (FOLVITE) 1 mg tablet Take 1 tablet (1,000 mcg total) by mouth daily 30 tablet 6    guaiFENesin 400 mg Take 1 tablet (400 mg total) by mouth every 8 (eight) hours as needed for cough 30 tablet 0    magnesium oxide (MAG-OX) 400 mg Take 400 mg by mouth 3 (three) times a day      metoprolol succinate (TOPROL XL) 25 mg 24 hr tablet Take 1 tablet (25 mg total) by mouth daily 30 tablet 5    montelukast (SINGULAIR) 10 mg tablet TAKE ONE TABLET BY MOUTH EVERY DAY AS DIRECTED 30 tablet 1    Multiple Vitamin (MULTIVITAMIN) tablet Take 1 tablet by mouth daily      ONETOUCH VERIO test strip       potassium chloride (K-DUR,KLOR-CON) 20 mEq tablet Take 20 mEq by mouth daily      sucralfate (CARAFATE) 1 g/10 mL suspension Take 10 mL (1 g total) by mouth 4 (four) times a day (with meals and at bedtime) 1200 mL 1    traMADol-acetaminophen (ULTRACET) 37 5-325 mg per tablet Take 1 tablet by mouth every 12 (twelve) hours as needed for moderate pain 30 tablet 0    albuterol (VENTOLIN HFA) 90 mcg/act inhaler Inhale 2 puffs every 6 (six) hours as needed for wheezing 18 g 5    diphenhydrAMINE (BENADRYL) 25 mg tablet Take 1 tablet (25 mg total) by mouth every 6 (six) hours as needed (Migraine headaches) 30 tablet 0    levothyroxine 175 mcg tablet Take 1 tablet (175 mcg total) by mouth daily 30 tablet 5    oxyCODONE-acetaminophen (PERCOCET) 5-325 mg per tablet Take 1 tablet by mouth every 6 (six) hours as needed for moderate pain (For chest and back pain since recent hospitalization)Max Daily Amount: 4 tablets 30 tablet 0     Current Facility-Administered Medications   Medication Dose Route Frequency Provider Last Rate Last Dose    [START ON 7/19/2019] acetaminophen (TYLENOL) tablet 500 mg  500 mg Oral Q8H PRN Jeannette Retana MD             History of Present Illness     Physician Requesting Consult:  Wero Diamond MD    Reason for Consult / Principal Problem:  Cardiology follow-up of recent cardiac arrest and prolonged QT syndrome    HPI: Lalo Strickland is a 40y o  year old female who presents for cardiology follow-up of recent hospital admission at [facility] from 06/20 through 07/03/2019, at which time the patient presented with unresponsiveness, apnea, bradycardia, hypotension, transient pulseless electrical activity upon arrival to the emergency department, multiple episodes of ventricular tachycardia requiring 5 defibrillation attempts and approximately 5 doses of epinephrine  She eventually regained spontaneous circulation 40-45 minutes after CPR and the above measures  It was felt after workup that patient had cardiac arrest due to prolonged QT interval from various psychotropic medications  She had incidental finding of right lower extremity DVT, some evidence of mild pulmonary hypertension, and migraine headaches with associated nausea and vomiting  The patient was started on Depakote for migraine prophylaxis  She did have acute kidney injury during hospitalization    The patient received a list of medications to avoid because of fear of QTC prolongation  She was felt to have chest wall pain as a result of CPR and also was noted to have uncontrolled hypothyroidism, several bouts of migraine headache with nausea and vomiting during her hospital stay  She was sent home on Eliquis initially 10 milligrams b i d  Than 5 milligrams b i d , potassium chloride, Depakote, increased dose of bupropion, increased dose of 6 levothyroxine, Klonopin 0 5 milligrams b i d  And oxycodone for chest wall and back pain as needed  She was asked to discontinue aspirin and Ambien  The patient had been having multiple recent episodes of unresponsiveness for 5-10 minutes and had been on psychotropic medications including bupropion, clonazepam, gabapentin, and Pristiq  In fact, the patient was seen at [facility] on 06/19/2019 with unresponsiveness, at which time extensive blood work, CT of head, chest x-ray, ECG were all unremarkable  She was sent home only to be readmitted to [facility] the very next day with the above history  The patient was also hospitalized at [facility] from 06/13 through 06/14/2019 and also in April, 2019 with unresponsiveness and altered mental status, not responding to Narcan with Neurology and Psychiatry consultation  Plan was to follow-up with polysomnography and video-assisted EEG  The patient was found on 06/13/2019 to have significant hypothyroidism with TSH elevation to 27, but she had already been following with Endocrinology for this issue  She was also found to have hypocalcemia with calcium level of 7 5 with increase in dose of calcitriol  Patient has chronic migraine headaches, generally 2-3 episodes per week with associated nausea  She has chronic recurrent depression with anxiety  The patient was just seen by family practice on 07/17/2019 and was referred to us in part to follow up on recent hypotension, for which her dose of metoprolol was reduced, as well as because of her recent cardiac arrest  She will also be seen by [facility] electrophysiology in the future  The patient currently complains of recent onset of cough, chest and back pain which has been present since her CPR on 06/20/2019, sputum production, rattling in her chest with onset on 07/12/2019  She was felt clinically to have recurrence of pneumonia and was placed on doxycycline on 07/13/2019, which has resulted in worsened nausea and some emesis and will be discontinued in the next day or so  The patient is able to get up and down stairs slowly but complains of worsening dizziness, weakness, and tiredness since discharge from the hospital and also for the past several months  The patient is currently on short-term disability and plans to apply for long-term disability  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, 2019 when she had her 1st hospitalization for unresponsiveness  Historical Information   Past Medical History:   Diagnosis Date    Altered mental status     Asthma     Cancer ([facility] [address] )     thyroid    Cervical adenopathy     Concussion without loss of consciousness     Depression     Disease of thyroid gland     Esophageal reflux     Frequent headaches     Gastritis     Hypocalcemia     Hypoglycemia     Malignant neoplasm of thyroid gland (HCC)     Migraine     Myositis of multiple sites     Psychiatric disorder     depression    Sinus headache     Worsening headaches      Past Surgical History:  Past Surgical History:   Procedure Laterality Date    ABDOMINAL SURGERY  2011    gastric bypass    CHOLECYSTECTOMY  1993    lap    COLONOSCOPY N/A 12/12/2016    Procedure: COLONOSCOPY;  Surgeon: Gill Coates MD;  Location: [facility] GI LAB; Service:     ENDOMETRIAL BIOPSY      last assessed: 7/26/17    ESOPHAGOGASTRODUODENOSCOPY N/A 12/12/2016    Procedure: ESOPHAGOGASTRODUODENOSCOPY (EGD); Surgeon: Gill Coates MD;  Location: [facility] GI LAB;   Service:    Randall Alberto LIGATION      GASTRIC BYPASS      HERNIA REPAIR      hiatel hernia repair    HYSTERECTOMY      INTUSSUSCEPTION REPAIR  2016    LEISA-EN-Y PROCEDURE  2011    last assessed: 10/5/16    THYROID SURGERY      THYROIDECTOMY  2016     Social History     Substance and Sexual Activity   Alcohol Use Not Currently    Comment: occasional     Social History     Substance and Sexual Activity   Drug Use No     Social History     Tobacco Use   Smoking Status Never Smoker   Smokeless Tobacco Never Used     Family History   Problem Relation Age of Onset    Lupus Mother         systemic erythematosus    Crohn's disease Mother     Diabetes Father     Liver disease Father     Rheum arthritis Maternal Grandmother        Meds/Allergies   Prior to Admission medications    Medication Sig Start Date End Date Taking?  Authorizing Provider   albuterol (2 5 mg/3 mL) 0 083 % nebulizer solution Take 2 5 mg by nebulization every 6 (six) hours as needed for wheezing   Yes Historical Provider, MD   apixaban (ELIQUIS) 5 mg Take 5 mg by mouth 2 (two) times a day   Yes Historical Provider, MD   buPROPion (WELLBUTRIN XL) 300 mg 24 hr tablet Take 300 mg by mouth every morning   Yes Historical Provider, MD   calcitriol (ROCALTROL) 0 5 MCG capsule Take 1 capsule (0 5 mcg total) by mouth 2 (two) times a day 6/14/19  Yes Jared Thompson MD   Calcium Carb-Cholecalciferol (CALCIUM 600 + D PO) 3 tablets 4 times Daily    Yes Historical Provider, MD   cetirizine (ZyrTEC) 10 mg tablet Take 10 mg by mouth daily   Yes Historical Provider, MD   chlorhexidine (PERIDEX) 0 12 % solution  5/9/19  Yes Historical Provider, MD   Cholecalciferol (VITAMIN D PO) Take 50,000 Units by mouth every 5 (five) days    Yes Historical Provider, MD   clonazePAM (KlonoPIN) 1 mg tablet Take 0 5 mg by mouth 2 (two) times a day 1/2 tablet twice a day   Yes Historical Provider, MD   Cyanocobalamin (VITAMIN B 12 PO) Take 1,000 mcg by mouth daily   Yes Historical Provider, MD divalproex sodium (DEPAKOTE) 250 mg EC tablet Take 250 mg by mouth 2 (two) times a day   Yes Historical Provider, MD   doxycycline hyclate (VIBRA-TABS) 100 mg tablet Take 1 tablet (100 mg total) by mouth 2 (two) times a day for 10 days 7/13/19 7/23/19 Yes DARIAN Sotelo   fluticasone (FLONASE) 50 mcg/act nasal spray 2 sprays into each nostril daily as needed for rhinitis 7/18/19  Yes Hannah Casey MD   folic acid (FOLVITE) 1 mg tablet Take 1 tablet (1,000 mcg total) by mouth daily 4/15/19  Yes DARIAN Sotelo   guaiFENesin 400 mg Take 1 tablet (400 mg total) by mouth every 8 (eight) hours as needed for cough 7/13/19  Yes DARIAN Sotelo   magnesium oxide (MAG-OX) 400 mg Take 400 mg by mouth 3 (three) times a day   Yes Historical Provider, MD   metoprolol succinate (TOPROL XL) 25 mg 24 hr tablet Take 1 tablet (25 mg total) by mouth daily 7/15/19  Yes DARIAN Sotelo   montelukast (SINGULAIR) 10 mg tablet TAKE ONE TABLET BY MOUTH EVERY DAY AS DIRECTED 5/28/19  Yes DARIAN Sotelo   Multiple Vitamin (MULTIVITAMIN) tablet Take 1 tablet by mouth daily   Yes Historical Provider, MD Leida Stanton test strip  9/6/18  Yes Historical Provider, MD   potassium chloride (K-DUR,KLOR-CON) 20 mEq tablet Take 20 mEq by mouth daily   Yes Historical Provider, MD   sucralfate (CARAFATE) 1 g/10 mL suspension Take 10 mL (1 g total) by mouth 4 (four) times a day (with meals and at bedtime) 7/15/19  Yes [address]   traMADol-acetaminophen (ULTRACET) 37 5-325 mg per tablet Take 1 tablet by mouth every 12 (twelve) hours as needed for moderate pain 6/10/19  Yes DARIAN Sotelo   fluticasone (FLONASE) 50 mcg/act nasal spray 2 sprays into each nostril daily 6/10/19 7/18/19 Yes DARIAN Sotelo   levothyroxine 150 mcg tablet Take 1 tablet (150 mcg total) by mouth daily  Patient taking differently: Take 175 mcg by mouth daily  6/14/19 7/18/19 Yes Fabiola Samano MD   Zolpidem Tartrate (AMBIEN PO) Take 10 mg by mouth daily at bedtime  7/18/19 Yes Historical Provider, MD   albuterol (VENTOLIN HFA) 90 mcg/act inhaler Inhale 2 puffs every 6 (six) hours as needed for wheezing 7/18/19   Earlene Damon MD   diphenhydrAMINE (BENADRYL) 25 mg tablet Take 1 tablet (25 mg total) by mouth every 6 (six) hours as needed (Migraine headaches) 7/18/19   Earlene Damon MD   levothyroxine 175 mcg tablet Take 1 tablet (175 mcg total) by mouth daily 7/18/19   Earlene Damon MD   oxyCODONE-acetaminophen (PERCOCET) 5-325 mg per tablet Take 1 tablet by mouth every 6 (six) hours as needed for moderate pain (For chest and back pain since recent hospitalization)Max Daily Amount: 4 tablets 7/18/19   Earlene Damon MD   gabapentin (NEURONTIN) 300 mg capsule TAKE ONE CAPSULE BY MOUTH TWICE A DAY AND TAKE TWO CAPSULES BY MOUTH IN THE EVENING  Patient not taking: Reported on 7/18/2019 3/17/19 7/18/19  DARIAN Sotelo   SM ASPIRIN ADULT LOW STRENGTH 81 MG EC tablet TAKE ONE TABLET BY MOUTH EVERY DAY AS DIRECTED  Patient not taking: Reported on 7/18/2019 6/24/19 7/18/19  DARIAN Grissom     Allergies   Allergen Reactions    Compazine [Prochlorperazine Edisylate] Anxiety    Demerol [Meperidine] Other (See Comments) and Hallucinations     hallucinations    Medical Tape Rash    Morphine      Morphine resistant    Prochlorperazine Anxiety    Valium [Diazepam] Syncope       Cardiovascular ROS:       More than 10 systems reviewed and all systems negative, except as noted in history of present illness    Objective     Vitals: Blood pressure 106/72, pulse 72, height 5' 5" (1 651 m), weight 79 8 kg (176 lb), last menstrual period 09/06/2017, SpO2 98 %, not currently breastfeeding  Body mass index is 29 29 kg/m²  Physical Exam     General-Well-developed mildly to moderately overweight   female  in no acute distress  Patient is alert, oriented X3 and cooperative    Skin-Warm and dry with no pallor, rashes, ecchymoses, lesions  HEENT-Normocephalic  Pupils equally round and reactive to light and accommodation  Extraocular muscles intact  Mucous membranes pink and moist  Sclerae nonicteric  Optic fundi poorly seen  Neck-No jugular venous distention, AJR, masses, thyromegaly, adenopathy, bruits  Lungs-No rales, rhonchi, wheezes  Heart-No murmur, gallop, rub, click, heave, thrill  Abdomen-Normal bowel sounds with no masses, organomegaly, guarding, tenderness, or rigidity  Mild abdominal obesity noted  Extremities-No cyanosis, clubbing, edema, pulse decrease  Neurological-No focal neurological signs  Imaging:     EKG 07/18/2019:     Normal sinus rhythm  Normal QT interval  Nonspecific septal T inversion  Poor R-wave progression  Low QRS voltage diffusely  Quite similar to 06/19/2019      Imaging    Chest X-Ray:    Xr Chest Pa & Lateral    Result Date: 7/15/2019  Impression No acute findings  Stable right lung nodule and mild elevation of right hemidiaphragm  Workstation performed: [id number]     Xr Chest 2 Views    Result Date: 6/20/2019  Impression No acute cardiopulmonary disease   Workstation performed: [id number]     Transthoracic echocardiogram 06/20/2019:    Normal LV with 65-70% LVEF  Normal RV and RV systolic function  1+ TR with no other significant valvular abnormalities  PA systolic pressure estimated at 43 mmHg    Cardiac MRI 06/27/2019:    No myocardial edema or fibrosis  Small pericardial effusion  LVEF 54% and no evidence of a rhythm 0 geniculate RV dysplasia  Mild MR/TR  Moderate opacifications of lungs with large bilateral pleural effusions consistent as well with pneumonia  Enlargement of pulmonary artery consistent with pulmonary hypertension    Lexiscan nuclear stress study 06/27/2019:    Slight decrease in tracer concentration in inferolateral wall both at rest and on stress images  Calculated LVEF 64%  Essentially normal Lexiscan nuclear stress study    Venous duplex scan 06/30/2019:    Subacute nonocclusive DVT of right common femoral vein and extensively in upper half of right thigh    Cardiac catheterization 06/28/2019:    No hemodynamically significant coronary artery disease  Greater than 60% LVEF and normal LV end diastolic pressure      Lab Review     Lab Results   Component Value Date    SODIUM 135 (L) 06/19/2019    K 5 1 06/19/2019     06/19/2019    CO2 22 06/19/2019    BUN 13 06/19/2019    CREATININE 0 84 06/19/2019    GLUCOSE 102 (H) 10/05/2017    GLUF 69 06/14/2019    CALCIUM 8 1 (L) 06/19/2019    AST 34 06/19/2019    ALT 58 06/19/2019    ALKPHOS 81 06/19/2019    PROT 6 6 10/05/2017    BILITOT 0 2 10/05/2017    EGFR 85 06/19/2019       Recent laboratory data from [facility]:    CBC 07/03/2019:  H/H 11 2/34 0 with normal WBC and platelets increased to 703K  BMP 07/03/2019: Within normal limits  Lab Results   Component Value Date    GLUCOSE 102 (H) 10/05/2017    CALCIUM 8 1 (L) 06/19/2019     10/05/2017    K 5 1 06/19/2019    CO2 22 06/19/2019     06/19/2019    BUN 13 06/19/2019    CREATININE 0 84 06/19/2019       Counseling / Coordination of Care  Total office time spent today 66  minutes       Latricia Goldberg MD

## 2019-07-22 ENCOUNTER — TELEPHONE (OUTPATIENT)
Dept: FAMILY MEDICINE CLINIC | Facility: CLINIC | Age: 44
End: 2019-07-22

## 2019-07-22 NOTE — TELEPHONE ENCOUNTER
Serina YOUSIF called and said that her home care PT was on hold because of her pneumonia but she still isn't feeling well so is refusing PT and they are going to discharge her at this time

## 2019-07-24 ENCOUNTER — TELEPHONE (OUTPATIENT)
Dept: NEUROLOGY | Facility: CLINIC | Age: 44
End: 2019-07-24

## 2019-07-24 NOTE — TELEPHONE ENCOUNTER
Patient came into the office with disability paperwork that needed to be filled out  The paper work was 6 pages with questions that need to be answered extensively  Per Corinna Corona, the forms would be $40 and it can take up to 2 weeks to complete  Patient became upset and stated that the "doctor didn't even do anything or show concern after her heart attack so let's put some priority on those forms " Told her that we would get them done as soon as we can but there are a lot of providers in the office this week  Also advised her that it would be a $40 charge   She then took the papers back and explained that she would get their  to submit them and also mumbled that "maybe she'll find a new doctor too"

## 2019-07-26 ENCOUNTER — TELEPHONE (OUTPATIENT)
Dept: FAMILY MEDICINE CLINIC | Facility: CLINIC | Age: 44
End: 2019-07-26

## 2019-07-26 ENCOUNTER — OFFICE VISIT (OUTPATIENT)
Dept: FAMILY MEDICINE CLINIC | Facility: CLINIC | Age: 44
End: 2019-07-26
Payer: COMMERCIAL

## 2019-07-26 VITALS
RESPIRATION RATE: 14 BRPM | WEIGHT: 176 LBS | SYSTOLIC BLOOD PRESSURE: 92 MMHG | DIASTOLIC BLOOD PRESSURE: 60 MMHG | BODY MASS INDEX: 29.32 KG/M2 | HEART RATE: 78 BPM | HEIGHT: 65 IN | TEMPERATURE: 97.5 F

## 2019-07-26 DIAGNOSIS — R53.82 CHRONIC FATIGUE: ICD-10-CM

## 2019-07-26 DIAGNOSIS — R53.81 PHYSICAL DECONDITIONING: ICD-10-CM

## 2019-07-26 DIAGNOSIS — G89.4 CHRONIC PAIN SYNDROME: ICD-10-CM

## 2019-07-26 DIAGNOSIS — G89.29 CHRONIC BILATERAL LOW BACK PAIN WITH RIGHT-SIDED SCIATICA: ICD-10-CM

## 2019-07-26 DIAGNOSIS — R10.2 PELVIC PAIN: ICD-10-CM

## 2019-07-26 DIAGNOSIS — M54.41 CHRONIC BILATERAL LOW BACK PAIN WITH RIGHT-SIDED SCIATICA: ICD-10-CM

## 2019-07-26 DIAGNOSIS — I45.81 PROLONGED QT INTERVAL SYNDROME: ICD-10-CM

## 2019-07-26 DIAGNOSIS — G43.719 INTRACTABLE CHRONIC MIGRAINE WITHOUT AURA AND WITHOUT STATUS MIGRAINOSUS: ICD-10-CM

## 2019-07-26 DIAGNOSIS — Z09 FOLLOW UP: Primary | ICD-10-CM

## 2019-07-26 LAB
SL AMB  POCT GLUCOSE, UA: NORMAL
SL AMB LEUKOCYTE ESTERASE,UA: NORMAL
SL AMB POCT BILIRUBIN,UA: NORMAL
SL AMB POCT BLOOD,UA: NORMAL
SL AMB POCT CLARITY,UA: NORMAL
SL AMB POCT COLOR,UA: NORMAL
SL AMB POCT KETONES,UA: NORMAL
SL AMB POCT NITRITE,UA: NORMAL
SL AMB POCT PH,UA: 5
SL AMB POCT SPECIFIC GRAVITY,UA: 1.02
SL AMB POCT URINE PROTEIN: NORMAL
SL AMB POCT UROBILINOGEN: NORMAL

## 2019-07-26 PROCEDURE — 1036F TOBACCO NON-USER: CPT | Performed by: NURSE PRACTITIONER

## 2019-07-26 PROCEDURE — 81003 URINALYSIS AUTO W/O SCOPE: CPT | Performed by: NURSE PRACTITIONER

## 2019-07-26 PROCEDURE — 99214 OFFICE O/P EST MOD 30 MIN: CPT | Performed by: NURSE PRACTITIONER

## 2019-07-26 RX ORDER — METOPROLOL SUCCINATE 25 MG/1
12.5 TABLET, EXTENDED RELEASE ORAL DAILY
Qty: 30 TABLET | Refills: 5 | Status: SHIPPED | OUTPATIENT
Start: 2019-07-26

## 2019-07-26 NOTE — TELEPHONE ENCOUNTER
She can talk with Dr Brenna Byrd (her endo) if there is a neuro in his network or she can call her insurance for a list  Dr Anupam Parmar is very busy and popular

## 2019-07-26 NOTE — TELEPHONE ENCOUNTER
[facility] called stating that Pt has been cx due to it not being effective  Also PT has been cx various [facility] services stating its her anxiety  [facility] hasnt seen pt since July 17th  Also [facility] wanted to inform provider that the pt hasnt been taking her oxycodone

## 2019-07-26 NOTE — PROGRESS NOTES
Assessment/Plan:    Problem List Items Addressed This Visit        Cardiovascular and Mediastinum    Migraine, chronic, without aura    Relevant Medications    metoprolol succinate (TOPROL XL) 25 mg 24 hr tablet    Other Relevant Orders    Ambulatory referral to Neurology    Prolonged QT interval syndrome    Relevant Medications    metoprolol succinate (TOPROL XL) 25 mg 24 hr tablet       Other    Chronic fatigue    Relevant Orders    Ambulatory referral to Physical Therapy    Chronic low back pain    Relevant Orders    Ambulatory referral to Physical Therapy      Other Visit Diagnoses     Follow up    -  Primary    Pelvic pain        Relevant Orders    POCT urine dip auto non-scope (Completed)    Chronic pain syndrome        Relevant Orders    Ambulatory referral to Physical Therapy    Physical deconditioning        Relevant Orders    Ambulatory referral to Physical Therapy      Pt clinically stable today  UA negative  No significant improvement in bps (still hypotensive) since decrease in toprol dose however she is no longer symptomatic  Appreciate Dr Simon Cali consult  Will decrease toprol to 12 5mg daily until she sees Dr Mark Canada  Given referrals: neuro for migraines/2nd opinion; PT for functional capacity  Recovering from PNA  Lungs clear today  Eventual Cardio-pulm rehab when cleared by specialists     The patient was counseled regarding instructions for management,-- risk factor reductions,-- prognosis,-- impressions,-- risks and benefits of treatment options,-- importance of compliance with treatment  I have reviewed the instructions with the patient, answering all questions to her satisfaction  Reminded patient of the consequences of polypharmacy (recent cardiac arrest d/t medication provoked prolonged QT syndrome)  Advised that she must find ways to cope with discomforts   We will support polyphamacy trends that occurred in the past  Advised to see specialists as scheduled (endo, cardio, neuro) Advised to cut toprol down to 12 5mg  Dose may be contributing to dizziness, low bp  Advised ER if sxs progress as discussed which she agreed to   Return in about 3 weeks (around 8/16/2019)  Subjective:      Patient ID: Petty Meléndez is a 40 y o  female  Chief Complaint   Patient presents with    Follow-up     pt still complains of dizziness , nausous        Here for f/u of multiple c/o    Was recently discharged from [facility] following a complex admission  Was hospitalized at [facility] 6/20-7/3/19 s/p PEA arrest in the field  Was resusitated, intubated  Cath neg for CAD  She was dx'd with drug related Prolonged QT syndrome  +multifocal PNA during hospital stay  See office note 7/12  Today she c/o pelvic pain, nausea  Dizziness is better but persisting  She saw Dr June Garcia, cardio, r/t Prolong QT  After extensive med adjustment to eliminate meds which may cause QT prolongation, her EKG was normal during visit  She is still scheduled to see EP cardio, Dr Michaela Tompkins in Sept      Long h/o polypharmacy  After med adjustments for above reason, migraines have relapsed worse than ever  She has ongoing nausea since stopping zofran  She is due to see Dr Emmy Terrell  She is asking for referral to a different neuro for second opinion  "I would like to see a headache specialist"    She is requesting a Residual functional capacity evaluation for permanent disability    Ongoing thyroid issues  Latest TSH >90  Suspect noncompliance  Endo, Dr Jen Guillory, managing very closely,          The following portions of the patient's history were reviewed and updated as appropriate: allergies, current medications, past family history, past medical history, past social history, past surgical history and problem list     Review of Systems   Constitutional: Positive for fatigue  Negative for activity change, appetite change, fever and unexpected weight change  HENT: Negative for trouble swallowing      Eyes: Negative for visual disturbance  Respiratory: Positive for cough  Negative for chest tightness, shortness of breath and wheezing  Cardiovascular: Negative  Gastrointestinal: Positive for abdominal pain and nausea  Negative for blood in stool, constipation and diarrhea  Endocrine: Negative  Genitourinary: Positive for pelvic pain  Negative for difficulty urinating and dysuria  Musculoskeletal: Positive for arthralgias, back pain and myalgias  Neurological: Positive for dizziness, weakness and headaches  Negative for tremors, seizures, syncope and speech difficulty  Psychiatric/Behavioral: Positive for dysphoric mood  Negative for sleep disturbance and suicidal ideas           Current Outpatient Medications   Medication Sig Dispense Refill    albuterol (2 5 mg/3 mL) 0 083 % nebulizer solution Take 2 5 mg by nebulization every 6 (six) hours as needed for wheezing      albuterol (VENTOLIN HFA) 90 mcg/act inhaler Inhale 2 puffs every 6 (six) hours as needed for wheezing 18 g 5    apixaban (ELIQUIS) 5 mg Take 5 mg by mouth 2 (two) times a day      buPROPion (WELLBUTRIN XL) 300 mg 24 hr tablet Take 300 mg by mouth every morning      calcitriol (ROCALTROL) 0 5 MCG capsule Take 1 capsule (0 5 mcg total) by mouth 2 (two) times a day  0    Calcium Carb-Cholecalciferol (CALCIUM 600 + D PO) 3 tablets 4 times Daily       cetirizine (ZyrTEC) 10 mg tablet Take 10 mg by mouth daily      chlorhexidine (PERIDEX) 0 12 % solution       Cholecalciferol (VITAMIN D PO) Take 50,000 Units by mouth every 5 (five) days       clonazePAM (KlonoPIN) 1 mg tablet Take 0 5 mg by mouth 2 (two) times a day 1/2 tablet twice a day      Cyanocobalamin (VITAMIN B 12 PO) Take 1,000 mcg by mouth daily      diphenhydrAMINE (BENADRYL) 25 mg tablet Take 1 tablet (25 mg total) by mouth every 6 (six) hours as needed (Migraine headaches) 30 tablet 0    divalproex sodium (DEPAKOTE) 250 mg EC tablet Take 250 mg by mouth 2 (two) times a day      fluticasone (FLONASE) 50 mcg/act nasal spray 2 sprays into each nostril daily as needed for rhinitis 16 g 0    folic acid (FOLVITE) 1 mg tablet Take 1 tablet (1,000 mcg total) by mouth daily 30 tablet 6    levothyroxine 175 mcg tablet Take 1 tablet (175 mcg total) by mouth daily 30 tablet 5    magnesium oxide (MAG-OX) 400 mg Take 400 mg by mouth 3 (three) times a day      metoprolol succinate (TOPROL XL) 25 mg 24 hr tablet Take 0 5 tablets (12 5 mg total) by mouth daily 30 tablet 5    montelukast (SINGULAIR) 10 mg tablet TAKE ONE TABLET BY MOUTH EVERY DAY AS DIRECTED 30 tablet 1    Multiple Vitamin (MULTIVITAMIN) tablet Take 1 tablet by mouth daily      ONETOUCH VERIO test strip       potassium chloride (K-DUR,KLOR-CON) 20 mEq tablet Take 20 mEq by mouth daily      sucralfate (CARAFATE) 1 g/10 mL suspension Take 10 mL (1 g total) by mouth 4 (four) times a day (with meals and at bedtime) 1200 mL 1    traMADol-acetaminophen (ULTRACET) 37 5-325 mg per tablet Take 1 tablet by mouth every 12 (twelve) hours as needed for moderate pain 30 tablet 0    guaiFENesin 400 mg Take 1 tablet (400 mg total) by mouth every 8 (eight) hours as needed for cough (Patient not taking: Reported on 7/26/2019) 30 tablet 0     Current Facility-Administered Medications   Medication Dose Route Frequency Provider Last Rate Last Dose    acetaminophen (TYLENOL) tablet 500 mg  500 mg Oral Q8H PRN Crys Aponte MD           Objective:    BP 92/60 (BP Location: Left arm, Patient Position: Sitting, Cuff Size: Standard)   Pulse 78   Temp 97 5 °F (36 4 °C)   Resp 14   Ht 5' 5" (1 651 m)   Wt 79 8 kg (176 lb)   LMP 09/06/2017   BMI 29 29 kg/m²        Physical Exam   Constitutional: She is oriented to person, place, and time  Vital signs are normal  She appears well-developed and well-nourished  No distress  HENT:   Head: Normocephalic and atraumatic     Mouth/Throat: Oropharynx is clear and moist    Eyes: Pupils are equal, round, and reactive to light  EOM are normal    Neck: Normal range of motion  Neck supple  Cardiovascular: Normal rate, regular rhythm, normal heart sounds and intact distal pulses  Pulmonary/Chest: Effort normal and breath sounds normal    Abdominal: Soft  Bowel sounds are normal  There is no tenderness  Musculoskeletal: She exhibits no edema  Neurological: She is alert and oriented to person, place, and time  Coordination normal    Skin: Skin is warm and dry  Capillary refill takes less than 2 seconds  No pallor  Psychiatric: Her affect is blunt  She is slowed  She exhibits a depressed mood  She expresses no suicidal ideation  Nursing note and vitals reviewed               Lesli Hopper, [facility]

## 2019-07-29 DIAGNOSIS — K21.9 GASTROESOPHAGEAL REFLUX DISEASE WITHOUT ESOPHAGITIS: ICD-10-CM

## 2019-07-29 DIAGNOSIS — G43.719 INTRACTABLE CHRONIC MIGRAINE WITHOUT AURA AND WITHOUT STATUS MIGRAINOSUS: ICD-10-CM

## 2019-07-29 DIAGNOSIS — E87.6 HYPOKALEMIA: Primary | ICD-10-CM

## 2019-07-29 DIAGNOSIS — R05.8 ALLERGIC COUGH: ICD-10-CM

## 2019-07-29 RX ORDER — OMEPRAZOLE 40 MG/1
CAPSULE, DELAYED RELEASE ORAL
Qty: 30 CAPSULE | Refills: 6 | OUTPATIENT
Start: 2019-07-29

## 2019-07-29 RX ORDER — POTASSIUM CHLORIDE 20 MEQ/1
20 TABLET, EXTENDED RELEASE ORAL DAILY
Qty: 30 TABLET | Refills: 2 | Status: SHIPPED | OUTPATIENT
Start: 2019-07-29 | End: 2019-10-19 | Stop reason: SDUPTHER

## 2019-07-29 RX ORDER — MONTELUKAST SODIUM 10 MG/1
TABLET ORAL
Qty: 30 TABLET | Refills: 1 | Status: SHIPPED | OUTPATIENT
Start: 2019-07-29 | End: 2019-09-22 | Stop reason: SDUPTHER

## 2019-07-29 RX ORDER — DIVALPROEX SODIUM 250 MG/1
250 TABLET, DELAYED RELEASE ORAL 2 TIMES DAILY
Status: CANCELLED | OUTPATIENT
Start: 2019-07-29

## 2019-07-29 NOTE — TELEPHONE ENCOUNTER
Please advise Shanon Woody that depakote needs to come from prescriber, I do not prescribe this for her  Also, we can not cont omeprazole for now as it is associated with prolong QT       I filled the others

## 2019-07-29 NOTE — TELEPHONE ENCOUNTER
Spoke with patient, she says that the Depakote was prescribed for her when she left the hospital  Patient does not have an appointment with Neurology until November

## 2019-07-30 NOTE — TELEPHONE ENCOUNTER
Cassidy Hayden has been approved for her admission in [place]  Please let me know when patient has her EMU scheduled so I can call to change the admit date for her auth   Thank you    Auth# [id number] valid 07/31/19

## 2019-07-30 NOTE — TELEPHONE ENCOUNTER
Called patient to schedule EMU admission now that we have the authorization  Patient stated that she has already cancelled all of her appts with Dr Ledy Moseley because "she didn't think her conditions were a priority " She then stated that "thank you  Goodbye" and disconnected the call  Dr Richar Wynne- I see that the patient is scheduled to see you as a new patient in November but do not see documentation that you accepted transfer of care  Please advise if you are agreeable to seeing the patient

## 2019-07-30 NOTE — TELEPHONE ENCOUNTER
I said no such thing but if she feels that way we will not see her any more  We had tried arranging EMU at [facility] as well   If she doesn't appreciate our efforts, she does need to find another neurologist

## 2019-07-31 ENCOUNTER — TELEPHONE (OUTPATIENT)
Dept: FAMILY MEDICINE CLINIC | Facility: CLINIC | Age: 44
End: 2019-07-31

## 2019-07-31 RX ORDER — DIVALPROEX SODIUM 250 MG/1
250 TABLET, DELAYED RELEASE ORAL 2 TIMES DAILY
Qty: 60 TABLET | Refills: 2 | Status: SHIPPED | OUTPATIENT
Start: 2019-07-31 | End: 2019-08-01 | Stop reason: DRUGHIGH

## 2019-07-31 NOTE — TELEPHONE ENCOUNTER
Please clarify if Juan Francisco Thomas should be on depakote ER or DR RIVERA sent over DR  Also, flagged amitriptyline, aspirn and topamax

## 2019-08-01 ENCOUNTER — TELEPHONE (OUTPATIENT)
Dept: FAMILY MEDICINE CLINIC | Facility: CLINIC | Age: 44
End: 2019-08-01

## 2019-08-01 DIAGNOSIS — Z79.899 HIGH RISK MEDICATION USE: ICD-10-CM

## 2019-08-01 DIAGNOSIS — F41.8 DEPRESSION WITH ANXIETY: Chronic | ICD-10-CM

## 2019-08-01 DIAGNOSIS — G43.719 INTRACTABLE CHRONIC MIGRAINE WITHOUT AURA AND WITHOUT STATUS MIGRAINOSUS: Primary | ICD-10-CM

## 2019-08-01 RX ORDER — DIVALPROEX SODIUM 500 MG/1
500 TABLET, EXTENDED RELEASE ORAL DAILY
Qty: 30 TABLET | Refills: 0 | Status: SHIPPED | OUTPATIENT
Start: 2019-08-01 | End: 2019-08-28 | Stop reason: SDUPTHER

## 2019-08-01 NOTE — TELEPHONE ENCOUNTER
Jen:      Corin Riggs from [facility] called and they have a question on patient's Depakote  Please c/b @ [phone]  She will be in until 9 tonight  The patient is out of this medication

## 2019-08-01 NOTE — TELEPHONE ENCOUNTER
Please advise Scarlet Louis that we need to check depakote level since she was started by [facility]  Please print order and leave at        Also med prescribing issue cleared up

## 2019-08-05 LAB
VALPROATE FREE SERPL-MCNC: 3.1 UG/ML (ref 6–22)
VALPROATE SERPL-MCNC: 24 UG/ML (ref 50–100)

## 2019-08-05 NOTE — TELEPHONE ENCOUNTER
Called and Left a message on pt's answering machine for a call back  3rd attempt, unable to reach the patient      Marley Gregory- Dr Sunita Khanna did not accept transfer of care and feeling see another practice is appropriate for this patient  Can this appointment be cancelled and a letter sent to the patient?

## 2019-08-05 NOTE — TELEPHONE ENCOUNTER
Maggy, patients appt already cancelled  Her visit with Dr Catalina Calles for November was cancelled and reason states that patient cancelled and does not want to come back to Neurology

## 2019-08-06 ENCOUNTER — TELEPHONE (OUTPATIENT)
Dept: FAMILY MEDICINE CLINIC | Facility: CLINIC | Age: 44
End: 2019-08-06

## 2019-08-06 NOTE — TELEPHONE ENCOUNTER
depakote rx'd by [facility] for bipolar/depression  Drug levels are ok for reason it is prescribed

## 2019-08-07 ENCOUNTER — TELEPHONE (OUTPATIENT)
Dept: FAMILY MEDICINE CLINIC | Facility: CLINIC | Age: 44
End: 2019-08-07

## 2019-08-07 DIAGNOSIS — R26.9 GAIT DISTURBANCE: ICD-10-CM

## 2019-08-07 DIAGNOSIS — R53.1 WEAKNESS: Primary | ICD-10-CM

## 2019-08-15 ENCOUNTER — TELEPHONE (OUTPATIENT)
Dept: CARDIOLOGY CLINIC | Facility: CLINIC | Age: 44
End: 2019-08-15

## 2019-08-15 NOTE — TELEPHONE ENCOUNTER
Workforce papers completed and signed by Dr Brenna Rick; forms with office notes sent to [employer] via regular mail; signed application forms scanned to chart

## 2019-08-20 ENCOUNTER — TELEPHONE (OUTPATIENT)
Dept: FAMILY MEDICINE CLINIC | Facility: CLINIC | Age: 44
End: 2019-08-20

## 2019-08-20 NOTE — TELEPHONE ENCOUNTER
Jam Jarvis,     Patient called and said that she was checking on a referral that she requested because her cardiologist is wanting her to have a heart monitor  I have never heard of a referral for a heart monitor  I would think that her cardiologist would have to do it being that they are the ones that want the heart monitor

## 2019-08-20 NOTE — TELEPHONE ENCOUNTER
I phoned the pt to ask her what she was seeking pt hung up after stating it was to confusing for her

## 2019-08-28 DIAGNOSIS — G43.719 INTRACTABLE CHRONIC MIGRAINE WITHOUT AURA AND WITHOUT STATUS MIGRAINOSUS: ICD-10-CM

## 2019-08-28 DIAGNOSIS — F41.8 DEPRESSION WITH ANXIETY: Chronic | ICD-10-CM

## 2019-08-28 RX ORDER — DIVALPROEX SODIUM 500 MG/1
TABLET, EXTENDED RELEASE ORAL
Qty: 30 TABLET | Refills: 0 | Status: SHIPPED | OUTPATIENT
Start: 2019-08-28

## 2019-09-03 ENCOUNTER — TELEPHONE (OUTPATIENT)
Dept: CARDIOLOGY CLINIC | Facility: CLINIC | Age: 44
End: 2019-09-03

## 2019-09-06 DIAGNOSIS — R10.13 EPIGASTRIC PAIN: ICD-10-CM

## 2019-09-06 RX ORDER — SUCRALFATE 1 G/10ML
SUSPENSION ORAL
Qty: 1200 ML | Refills: 1 | Status: SHIPPED | OUTPATIENT
Start: 2019-09-06

## 2019-09-16 ENCOUNTER — TRANSCRIBE ORDERS (OUTPATIENT)
Dept: GASTROENTEROLOGY | Facility: CLINIC | Age: 44
End: 2019-09-16

## 2019-09-21 DIAGNOSIS — R05.8 ALLERGIC COUGH: ICD-10-CM

## 2019-09-22 RX ORDER — MONTELUKAST SODIUM 10 MG/1
TABLET ORAL
Qty: 30 TABLET | Refills: 1 | Status: SHIPPED | OUTPATIENT
Start: 2019-09-22

## 2019-10-17 DIAGNOSIS — Z98.84 S/P GASTRIC BYPASS: ICD-10-CM

## 2019-10-17 DIAGNOSIS — Z78.9 DEEP VEIN THROMBOSIS (DVT) PROPHYLAXIS PRESCRIBED AT DISCHARGE: ICD-10-CM

## 2019-10-17 DIAGNOSIS — J30.2 CHRONIC SEASONAL ALLERGIC RHINITIS: ICD-10-CM

## 2019-10-17 RX ORDER — FOLIC ACID 1 MG/1
TABLET ORAL
Qty: 30 TABLET | Refills: 6 | Status: SHIPPED | OUTPATIENT
Start: 2019-10-17

## 2019-10-17 RX ORDER — ASPIRIN 81 MG/1
TABLET, DELAYED RELEASE ORAL
Qty: 30 TABLET | Refills: 3 | Status: SHIPPED | OUTPATIENT
Start: 2019-10-17

## 2019-10-17 RX ORDER — CETIRIZINE HYDROCHLORIDE 10 MG/1
TABLET ORAL
Qty: 30 TABLET | Refills: 6 | Status: SHIPPED | OUTPATIENT
Start: 2019-10-17

## 2019-10-19 DIAGNOSIS — E87.6 HYPOKALEMIA: ICD-10-CM

## 2019-10-19 RX ORDER — POTASSIUM CHLORIDE 20 MEQ/1
TABLET, EXTENDED RELEASE ORAL
Qty: 30 TABLET | Refills: 2 | Status: SHIPPED | OUTPATIENT
Start: 2019-10-19

## 2020-07-06 DIAGNOSIS — J30.2 CHRONIC SEASONAL ALLERGIC RHINITIS: ICD-10-CM

## 2020-07-06 DIAGNOSIS — Z98.84 S/P GASTRIC BYPASS: ICD-10-CM

## 2020-07-06 RX ORDER — CETIRIZINE HYDROCHLORIDE 10 MG/1
TABLET ORAL
Qty: 30 TABLET | Refills: 6 | OUTPATIENT
Start: 2020-07-06

## 2020-07-06 RX ORDER — FOLIC ACID 1 MG/1
TABLET ORAL
Qty: 30 TABLET | Refills: 6 | OUTPATIENT
Start: 2020-07-06

## 2022-03-25 NOTE — TELEPHONE ENCOUNTER
Received email from Alex Mayen regarding a 6 hour video eeg order in the scheduling work queue- in the notations there is a request for an EMU admission  It looks like the patient was recently in [facility] with a cardiology eval  Do you want an EMU admission?  Or did you want to see the patient in followup on 8/7 first? 

## 2023-07-26 NOTE — PROGRESS NOTES
Daily Note     Today's date: 2018  Patient name: Abdelrahman Funes  : 1975  MRN: [id number]  Referring provider: Heron Mohs, CRNP  Dx:   Encounter Diagnosis     ICD-10-CM    1  Cervical radiculopathy, acute M54 12                   Subjective: I hurt in the neck & shoulders  Objective: See treatment diary below    Precautions: Fibromyalgia  Hx Migraines, thyroid CA  Daily Treatment Diary     Manual   2 3         Manual traction   10'  10' 10' --        STM - scalenes/UT seated   10'  10'  5' seated        PROM    B shlds   supine x 5 min   --                                      Visits 1 2  3 4 5        Exercise Diary  18        Posture Edu 15' reviewed reviewed  review review        C/S Ret 5x X 10 ea seated & supine  X 10 ea seated & supine  x10 10x seated          pulleys for flexion x 10    10x          supine AA shldr flexion x 7 reps  supine AA shlr flexion x 5reps ea  ----        Treadmill    10 min 2 2 NuStep x10min        Scapular retraction    Red tb20x Red tb x 20        Scap retract with depression    20x 20x        Pectoralis doorway stretch    10x 10x        Wall Lifts    20x hep        Self-stretch C Flex, Bilat SB    3 x 10 hep        TB IR/ER/EXT     Red x10 each        Blue PB rollout stretch     15 x 5 sec                                                                                      HEP Initiated/edu To continue with current HEP            Time 15'                Modalities              CP C/S (seated)     10 min                                      Assessment: Tolerated treatment fair  Patient would benefit from continued PT      Plan: Progress treatment as tolerated  Prednisone Counseling:  I discussed with the patient the risks of prolonged use of prednisone including but not limited to weight gain, insomnia, osteoporosis, mood changes, diabetes, susceptibility to infection, glaucoma and high blood pressure.  In cases where prednisone use is prolonged, patients should be monitored with blood pressure checks, serum glucose levels and an eye exam.  Additionally, the patient may need to be placed on GI prophylaxis, PCP prophylaxis, and calcium and vitamin D supplementation and/or a bisphosphonate.  The patient verbalized understanding of the proper use and the possible adverse effects of prednisone.  All of the patient's questions and concerns were addressed.
